# Patient Record
Sex: FEMALE | URBAN - METROPOLITAN AREA
[De-identification: names, ages, dates, MRNs, and addresses within clinical notes are randomized per-mention and may not be internally consistent; named-entity substitution may affect disease eponyms.]

---

## 2021-11-03 ENCOUNTER — APPOINTMENT (OUTPATIENT)
Dept: URBAN - METROPOLITAN AREA CLINIC 321 | Age: 26
Setting detail: DERMATOLOGY
End: 2021-11-03

## 2021-11-03 DIAGNOSIS — L70.0 ACNE VULGARIS: ICD-10-CM

## 2021-11-03 DIAGNOSIS — Z79.899 OTHER LONG TERM (CURRENT) DRUG THERAPY: ICD-10-CM

## 2021-11-03 PROCEDURE — OTHER ISOTRETINOIN INITIATION: OTHER

## 2021-11-03 PROCEDURE — OTHER HIGH RISK MEDICATION MONITORING: OTHER

## 2021-11-03 PROCEDURE — OTHER COUNSELING: OTHER

## 2021-11-03 PROCEDURE — OTHER ORDER TESTS: OTHER

## 2021-11-03 PROCEDURE — OTHER PRESCRIPTION: OTHER

## 2021-11-03 PROCEDURE — OTHER PATIENT SPECIFIC COUNSELING: OTHER

## 2021-11-03 PROCEDURE — 99204 OFFICE O/P NEW MOD 45 MIN: CPT

## 2021-11-03 RX ORDER — CLINDAMYCIN PHOSPHATE 10 MG/ML
LOTION TOPICAL
Qty: 60 | Refills: 0 | Status: ERX

## 2021-11-03 RX ORDER — TRETINOIN 0.5 MG/G
GEL TOPICAL
Qty: 45 | Refills: 0 | Status: ERX

## 2021-11-03 NOTE — HPI: PIMPLES (ACNE)
What Type Of Note Output Would You Prefer (Optional)?: Bullet Format
How Severe Is Your Acne?: moderate
Is This A New Presentation, Or A Follow-Up?: Acne
Females Only: When Was Your Last Menstrual Period?: 10/28/2021

## 2021-11-03 NOTE — PROCEDURE: COUNSELING
Erythromycin Counseling:  I discussed with the patient the risks of erythromycin including but not limited to GI upset, allergic reaction, drug rash, diarrhea, increase in liver enzymes, and yeast infections.
Minocycline Counseling: Patient advised regarding possible photosensitivity and discoloration of the teeth, skin, lips, tongue and gums.  Patient instructed to avoid sunlight, if possible.  When exposed to sunlight, patients should wear protective clothing, sunglasses, and sunscreen.  The patient was instructed to call the office immediately if the following severe adverse effects occur:  hearing changes, easy bruising/bleeding, severe headache, or vision changes.  The patient verbalized understanding of the proper use and possible adverse effects of minocycline.  All of the patient's questions and concerns were addressed.
Topical Retinoids Recommendations: Rec OTC adapalene gel 0.1% (i.e differin) if Rx is not covered.
Azithromycin Counseling:  I discussed with the patient the risks of azithromycin including but not limited to GI upset, allergic reaction, drug rash, diarrhea, and yeast infections.
Minocycline Pregnancy And Lactation Text: This medication is Pregnancy Category D and not consider safe during pregnancy. It is also excreted in breast milk.
Topical Clindamycin Pregnancy And Lactation Text: This medication is Pregnancy Category B and is considered safe during pregnancy. It is unknown if it is excreted in breast milk.
Isotretinoin Counseling: Patient should get monthly blood tests, not donate blood, not drive at night if vision affected, not share medication, and not undergo elective surgery for 6 months after tx completed. Side effects reviewed, pt to contact office should one occur.
Spironolactone Pregnancy And Lactation Text: This medication can cause feminization of the male fetus and should be avoided during pregnancy. The active metabolite is also found in breast milk.
Tetracycline Counseling: Patient counseled regarding possible photosensitivity and increased risk for sunburn.  Patient instructed to avoid sunlight, if possible.  When exposed to sunlight, patients should wear protective clothing, sunglasses, and sunscreen.  The patient was instructed to call the office immediately if the following severe adverse effects occur:  hearing changes, easy bruising/bleeding, severe headache, or vision changes.  The patient verbalized understanding of the proper use and possible adverse effects of tetracycline.  All of the patient's questions and concerns were addressed. Patient understands to avoid pregnancy while on therapy due to potential birth defects.
Doxycycline Pregnancy And Lactation Text: This medication is Pregnancy Category D and not consider safe during pregnancy. It is also excreted in breast milk but is considered safe for shorter treatment courses.
Bactrim Pregnancy And Lactation Text: This medication is Pregnancy Category D and is known to cause fetal risk.  It is also excreted in breast milk.
Azithromycin Pregnancy And Lactation Text: This medication is considered safe during pregnancy and is also secreted in breast milk.
Erythromycin Pregnancy And Lactation Text: This medication is Pregnancy Category B and is considered safe during pregnancy. It is also excreted in breast milk.
Sarecycline Counseling: Patient advised regarding possible photosensitivity and discoloration of the teeth, skin, lips, tongue and gums.  Patient instructed to avoid sunlight, if possible.  When exposed to sunlight, patients should wear protective clothing, sunglasses, and sunscreen.  The patient was instructed to call the office immediately if the following severe adverse effects occur:  hearing changes, easy bruising/bleeding, severe headache, or vision changes.  The patient verbalized understanding of the proper use and possible adverse effects of sarecycline.  All of the patient's questions and concerns were addressed.
Tazorac Pregnancy And Lactation Text: This medication is not safe during pregnancy. It is unknown if this medication is excreted in breast milk.
Bpo Recommendations: Recommend panoxyl or cerave 4% BPO wash on face for 30-45 seconds daily or as tolerated (reduce use if drying/irritating to face). Can bleach fabrics/hair.  Recommend 10% BPO wash (i.e panoxyl) for body for 2-3 minutes daily, adjusting frequency/duration as tolerated.
Spironolactone Counseling: Patient advised regarding risks of diarrhea, abdominal pain, hyperkalemia, birth defects (for female patients), liver toxicity and renal toxicity. The patient may need blood work to monitor liver and kidney function and potassium levels while on therapy. The patient verbalized understanding of the proper use and possible adverse effects of spironolactone.  All of the patient's questions and concerns were addressed.
Dapsone Pregnancy And Lactation Text: This medication is Pregnancy Category C and is not considered safe during pregnancy or breast feeding.
Detail Level: Detailed
Benzoyl Peroxide Counseling: Patient counseled that medicine may cause skin irritation and bleach clothing.  In the event of skin irritation, the patient was advised to reduce the amount of the drug applied or use it less frequently.   The patient verbalized understanding of the proper use and possible adverse effects of benzoyl peroxide.  All of the patient's questions and concerns were addressed.
High Dose Vitamin A Counseling: Side effects reviewed, pt to contact office should one occur.
Use Enhanced Medication Counseling?: No
High Dose Vitamin A Pregnancy And Lactation Text: High dose vitamin A therapy is contraindicated during pregnancy and breast feeding.
Birth Control Pills Counseling: Birth Control Pill Counseling: I discussed with the patient the potential side effects of OCPs including but not limited to increased risk of stroke, heart attack, thrombophlebitis, deep venous thrombosis, hepatic adenomas, breast changes, GI upset, headaches, and depression.  The patient verbalized understanding of the proper use and possible adverse effects of OCPs. All of the patient's questions and concerns were addressed.
Topical Sulfur Applications Pregnancy And Lactation Text: This medication is Pregnancy Category C and has an unknown safety profile during pregnancy. It is unknown if this topical medication is excreted in breast milk.
Topical Retinoid Pregnancy And Lactation Text: This medication is Pregnancy Category C. It is unknown if this medication is excreted in breast milk.
Tazorac Counseling:  Patient advised that medication is irritating and drying.  Patient may need to apply sparingly and wash off after an hour before eventually leaving it on overnight.  The patient verbalized understanding of the proper use and possible adverse effects of tazorac.  All of the patient's questions and concerns were addressed.
Isotretinoin Pregnancy And Lactation Text: This medication is Pregnancy Category X and is considered extremely dangerous during pregnancy. It is unknown if it is excreted in breast milk.
Topical Retinoid counseling:  Patient advised to apply a pea-sized amount only at bedtime and wait 30 minutes after washing their face before applying.  If too drying, patient may add a non-comedogenic moisturizer. The patient verbalized understanding of the proper use and possible adverse effects of retinoids.  All of the patient's questions and concerns were addressed.
Topical Sulfur Applications Counseling: Topical Sulfur Counseling: Patient counseled that this medication may cause skin irritation or allergic reactions.  In the event of skin irritation, the patient was advised to reduce the amount of the drug applied or use it less frequently.   The patient verbalized understanding of the proper use and possible adverse effects of topical sulfur application.  All of the patient's questions and concerns were addressed.
Doxycycline Counseling:  Patient counseled regarding possible photosensitivity and increased risk for sunburn.  Patient instructed to avoid sunlight, if possible.  When exposed to sunlight, patients should wear protective clothing, sunglasses, and sunscreen.  The patient was instructed to call the office immediately if the following severe adverse effects occur:  hearing changes, easy bruising/bleeding, severe headache, or vision changes.  The patient verbalized understanding of the proper use and possible adverse effects of doxycycline.  All of the patient's questions and concerns were addressed.
Dapsone Counseling: I discussed with the patient the risks of dapsone including but not limited to hemolytic anemia, agranulocytosis, rashes, methemoglobinemia, kidney failure, peripheral neuropathy, headaches, GI upset, and liver toxicity.  Patients who start dapsone require monitoring including baseline LFTs and weekly CBCs for the first month, then every month thereafter.  The patient verbalized understanding of the proper use and possible adverse effects of dapsone.  All of the patient's questions and concerns were addressed.
Topical Clindamycin Counseling: Patient counseled that this medication may cause skin irritation or allergic reactions.  In the event of skin irritation, the patient was advised to reduce the amount of the drug applied or use it less frequently.   The patient verbalized understanding of the proper use and possible adverse effects of clindamycin.  All of the patient's questions and concerns were addressed.
Birth Control Pills Pregnancy And Lactation Text: This medication should be avoided if pregnant and for the first 30 days post-partum.
Bactrim Counseling:  I discussed with the patient the risks of sulfa antibiotics including but not limited to GI upset, allergic reaction, drug rash, diarrhea, dizziness, photosensitivity, and yeast infections.  Rarely, more serious reactions can occur including but not limited to aplastic anemia, agranulocytosis, methemoglobinemia, blood dyscrasias, liver or kidney failure, lung infiltrates or desquamative/blistering drug rashes.
Benzoyl Peroxide Pregnancy And Lactation Text: This medication is Pregnancy Category C. It is unknown if benzoyl peroxide is excreted in breast milk.

## 2021-11-03 NOTE — PROCEDURE: HIGH RISK MEDICATION MONITORING
Tremfya Counseling: I discussed with the patient the risks of guselkumab including but not limited to immunosuppression, serious infections, and drug reactions.  The patient understands that monitoring is required including a PPD at baseline and must alert us or the primary physician if symptoms of infection or other concerning signs are noted.
Cephalexin Pregnancy And Lactation Text: This medication is Pregnancy Category B and considered safe during pregnancy.  It is also excreted in breast milk but can be used safely for shorter doses.
Bexarotene Counseling:  I discussed with the patient the risks of bexarotene including but not limited to hair loss, dry lips/skin/eyes, liver abnormalities, hyperlipidemia, pancreatitis, depression/suicidal ideation, photosensitivity, drug rash/allergic reactions, hypothyroidism, anemia, leukopenia, infection, cataracts, and teratogenicity.  Patient understands that they will need regular blood tests to check lipid profile, liver function tests, white blood cell count, thyroid function tests and pregnancy test if applicable.
Ketoconazole Counseling:   Patient counseled regarding improving absorption with orange juice.  Adverse effects include but are not limited to breast enlargement, headache, diarrhea, nausea, upset stomach, liver function test abnormalities, taste disturbance, and stomach pain.  There is a rare possibility of liver failure that can occur when taking ketoconazole. The patient understands that monitoring of LFTs may be required, especially at baseline. The patient verbalized understanding of the proper use and possible adverse effects of ketoconazole.  All of the patient's questions and concerns were addressed.
Elidel Pregnancy And Lactation Text: This medication is Pregnancy Category C. It is unknown if this medication is excreted in breast milk.
Hydroxychloroquine Counseling:  I discussed with the patient that a baseline ophthalmologic exam is needed at the start of therapy and every year thereafter while on therapy. A CBC may also be warranted for monitoring.  The side effects of this medication were discussed with the patient, including but not limited to agranulocytosis, aplastic anemia, seizures, rashes, retinopathy, and liver toxicity. Patient instructed to call the office should any adverse effect occur.  The patient verbalized understanding of the proper use and possible adverse effects of Plaquenil.  All the patient's questions and concerns were addressed.
Birth Control Pills Counseling: Birth Control Pill Counseling: I discussed with the patient the potential side effects of OCPs including but not limited to increased risk of stroke, heart attack, thrombophlebitis, deep venous thrombosis, hepatic adenomas, breast changes, GI upset, headaches, and depression.  The patient verbalized understanding of the proper use and possible adverse effects of OCPs. All of the patient's questions and concerns were addressed.
Cyclosporine Pregnancy And Lactation Text: This medication is Pregnancy Category C and it isn't know if it is safe during pregnancy. This medication is excreted in breast milk.
Tremfya Pregnancy And Lactation Text: The risk during pregnancy and breastfeeding is uncertain with this medication.
5-Fu Pregnancy And Lactation Text: This medication is Pregnancy Category X and contraindicated in pregnancy and in women who may become pregnant. It is unknown if this medication is excreted in breast milk.
Rituxan Pregnancy And Lactation Text: This medication is Pregnancy Category C and it isn't know if it is safe during pregnancy. It is unknown if this medication is excreted in breast milk but similar antibodies are known to be excreted.
Doxycycline Pregnancy And Lactation Text: This medication is Pregnancy Category D and not consider safe during pregnancy. It is also excreted in breast milk but is considered safe for shorter treatment courses.
Griseofulvin Pregnancy And Lactation Text: This medication is Pregnancy Category X and is known to cause serious birth defects. It is unknown if this medication is excreted in breast milk but breast feeding should be avoided.
Zyclara Counseling:  I discussed with the patient the risks of imiquimod including but not limited to erythema, scaling, itching, weeping, crusting, and pain.  Patient understands that the inflammatory response to imiquimod is variable from person to person and was educated regarded proper titration schedule.  If flu-like symptoms develop, patient knows to discontinue the medication and contact us.
Quinolones Counseling:  I discussed with the patient the risks of fluoroquinolones including but not limited to GI upset, allergic reaction, drug rash, diarrhea, dizziness, photosensitivity, yeast infections, liver function test abnormalities, tendonitis/tendon rupture.
Hydroxyzine Counseling: Patient advised that the medication is sedating and not to drive a car after taking this medication.  Patient informed of potential adverse effects including but not limited to dry mouth, urinary retention, and blurry vision.  The patient verbalized understanding of the proper use and possible adverse effects of hydroxyzine.  All of the patient's questions and concerns were addressed.
SSKI Counseling:  I discussed with the patient the risks of SSKI including but not limited to thyroid abnormalities, metallic taste, GI upset, fever, headache, acne, arthralgias, paraesthesias, lymphadenopathy, easy bleeding, arrhythmias, and allergic reaction.
Topical Clindamycin Pregnancy And Lactation Text: This medication is Pregnancy Category B and is considered safe during pregnancy. It is unknown if it is excreted in breast milk.
Drysol Pregnancy And Lactation Text: This medication is considered safe during pregnancy and breast feeding.
5-Fu Counseling: 5-Fluorouracil Counseling:  I discussed with the patient the risks of 5-fluorouracil including but not limited to erythema, scaling, itching, weeping, crusting, and pain.
Skyrizi Counseling: I discussed with the patient the risks of risankizumab-rzaa including but not limited to immunosuppression, and serious infections.  The patient understands that monitoring is required including a PPD at baseline and must alert us or the primary physician if symptoms of infection or other concerning signs are noted.
Albendazole Pregnancy And Lactation Text: This medication is Pregnancy Category C and it isn't known if it is safe during pregnancy. It is also excreted in breast milk.
Glycopyrrolate Pregnancy And Lactation Text: This medication is Pregnancy Category B and is considered safe during pregnancy. It is unknown if it is excreted breast milk.
Rifampin Counseling: I discussed with the patient the risks of rifampin including but not limited to liver damage, kidney damage, red-orange body fluids, nausea/vomiting and severe allergy.
Simponi Counseling:  I discussed with the patient the risks of golimumab including but not limited to myelosuppression, immunosuppression, autoimmune hepatitis, demyelinating diseases, lymphoma, and serious infections.  The patient understands that monitoring is required including a PPD at baseline and must alert us or the primary physician if symptoms of infection or other concerning signs are noted.
Cosentyx Pregnancy And Lactation Text: This medication is Pregnancy Category B and is considered safe during pregnancy. It is unknown if this medication is excreted in breast milk.
Benzoyl Peroxide Pregnancy And Lactation Text: This medication is Pregnancy Category C. It is unknown if benzoyl peroxide is excreted in breast milk.
Doxepin Pregnancy And Lactation Text: This medication is Pregnancy Category C and it isn't known if it is safe during pregnancy. It is also excreted in breast milk and breast feeding isn't recommended.
Otezla Pregnancy And Lactation Text: This medication is Pregnancy Category C and it isn't known if it is safe during pregnancy. It is unknown if it is excreted in breast milk.
Nsaids Counseling: NSAID Counseling: I discussed with the patient that NSAIDs should be taken with food. Prolonged use of NSAIDs can result in the development of stomach ulcers.  Patient advised to stop taking NSAIDs if abdominal pain occurs.  The patient verbalized understanding of the proper use and possible adverse effects of NSAIDs.  All of the patient's questions and concerns were addressed.
Hydroquinone Pregnancy And Lactation Text: This medication has not been assigned a Pregnancy Risk Category but animal studies failed to show danger with the topical medication. It is unknown if the medication is excreted in breast milk.
Isotretinoin Pregnancy And Lactation Text: This medication is Pregnancy Category X and is considered extremely dangerous during pregnancy. It is unknown if it is excreted in breast milk.
Oxybutynin Counseling:  I discussed with the patient the risks of oxybutynin including but not limited to skin rash, drowsiness, dry mouth, difficulty urinating, and blurred vision.
Erythromycin Counseling:  I discussed with the patient the risks of erythromycin including but not limited to GI upset, allergic reaction, drug rash, diarrhea, increase in liver enzymes, and yeast infections.
Clofazimine Pregnancy And Lactation Text: This medication is Pregnancy Category C and isn't considered safe during pregnancy. It is excreted in breast milk.
Xolair Pregnancy And Lactation Text: This medication is Pregnancy Category B and is considered safe during pregnancy. This medication is excreted in breast milk.
Eucrisa Counseling: Patient may experience a mild burning sensation during topical application. Eucrisa is not approved in children less than 2 years of age.
Glycopyrrolate Counseling:  I discussed with the patient the risks of glycopyrrolate including but not limited to skin rash, drowsiness, dry mouth, difficulty urinating, and blurred vision.
Doxycycline Counseling:  Patient counseled regarding possible photosensitivity and increased risk for sunburn.  Patient instructed to avoid sunlight, if possible.  When exposed to sunlight, patients should wear protective clothing, sunglasses, and sunscreen.  The patient was instructed to call the office immediately if the following severe adverse effects occur:  hearing changes, easy bruising/bleeding, severe headache, or vision changes.  The patient verbalized understanding of the proper use and possible adverse effects of doxycycline.  All of the patient's questions and concerns were addressed.
Birth Control Pills Pregnancy And Lactation Text: This medication should be avoided if pregnant and for the first 30 days post-partum.
Acitretin Pregnancy And Lactation Text: This medication is Pregnancy Category X and should not be given to women who are pregnant or may become pregnant in the future. This medication is excreted in breast milk.
Bactrim Counseling:  I discussed with the patient the risks of sulfa antibiotics including but not limited to GI upset, allergic reaction, drug rash, diarrhea, dizziness, photosensitivity, and yeast infections.  Rarely, more serious reactions can occur including but not limited to aplastic anemia, agranulocytosis, methemoglobinemia, blood dyscrasias, liver or kidney failure, lung infiltrates or desquamative/blistering drug rashes.
Prednisone Counseling:  I discussed with the patient the risks of prolonged use of prednisone including but not limited to weight gain, insomnia, osteoporosis, mood changes, diabetes, susceptibility to infection, glaucoma and high blood pressure.  In cases where prednisone use is prolonged, patients should be monitored with blood pressure checks, serum glucose levels and an eye exam.  Additionally, the patient may need to be placed on GI prophylaxis, PCP prophylaxis, and calcium and vitamin D supplementation and/or a bisphosphonate.  The patient verbalized understanding of the proper use and the possible adverse effects of prednisone.  All of the patient's questions and concerns were addressed.
Spironolactone Pregnancy And Lactation Text: This medication can cause feminization of the male fetus and should be avoided during pregnancy. The active metabolite is also found in breast milk.
Cimzia Counseling:  I discussed with the patient the risks of Cimzia including but not limited to immunosuppression, allergic reactions and infections.  The patient understands that monitoring is required including a PPD at baseline and must alert us or the primary physician if symptoms of infection or other concerning signs are noted.
Solaraze Pregnancy And Lactation Text: This medication is Pregnancy Category B and is considered safe. There is some data to suggest avoiding during the third trimester. It is unknown if this medication is excreted in breast milk.
Cosentyx Counseling:  I discussed with the patient the risks of Cosentyx including but not limited to worsening of Crohn's disease, immunosuppression, allergic reactions and infections.  The patient understands that monitoring is required including a PPD at baseline and must alert us or the primary physician if symptoms of infection or other concerning signs are noted.
Xelmicaelaz Pregnancy And Lactation Text: This medication is Pregnancy Category D and is not considered safe during pregnancy.  The risk during breast feeding is also uncertain.
Benzoyl Peroxide Counseling: Patient counseled that medicine may cause skin irritation and bleach clothing.  In the event of skin irritation, the patient was advised to reduce the amount of the drug applied or use it less frequently.   The patient verbalized understanding of the proper use and possible adverse effects of benzoyl peroxide.  All of the patient's questions and concerns were addressed.
Xeljanz Counseling: I discussed with the patient the risks of Xeljanz therapy including increased risk of infection, liver issues, headache, diarrhea, or cold symptoms. Live vaccines should be avoided. They were instructed to call if they have any problems.
Infliximab Counseling:  I discussed with the patient the risks of infliximab including but not limited to myelosuppression, immunosuppression, autoimmune hepatitis, demyelinating diseases, lymphoma, and serious infections.  The patient understands that monitoring is required including a PPD at baseline and must alert us or the primary physician if symptoms of infection or other concerning signs are noted.
High Dose Vitamin A Pregnancy And Lactation Text: High dose vitamin A therapy is contraindicated during pregnancy and breast feeding.
Azithromycin Pregnancy And Lactation Text: This medication is considered safe during pregnancy and is also secreted in breast milk.
Valtrex Counseling: I discussed with the patient the risks of valacyclovir including but not limited to kidney damage, nausea, vomiting and severe allergy.  The patient understands that if the infection seems to be worsening or is not improving, they are to call.
Itraconazole Pregnancy And Lactation Text: This medication is Pregnancy Category C and it isn't know if it is safe during pregnancy. It is also excreted in breast milk.
Azathioprine Pregnancy And Lactation Text: This medication is Pregnancy Category D and isn't considered safe during pregnancy. It is unknown if this medication is excreted in breast milk.
Ivermectin Counseling:  Patient instructed to take medication on an empty stomach with a full glass of water.  Patient informed of potential adverse effects including but not limited to nausea, diarrhea, dizziness, itching, and swelling of the extremities or lymph nodes.  The patient verbalized understanding of the proper use and possible adverse effects of ivermectin.  All of the patient's questions and concerns were addressed.
Bexarotene Pregnancy And Lactation Text: This medication is Pregnancy Category X and should not be given to women who are pregnant or may become pregnant. This medication should not be used if you are breast feeding.
Griseofulvin Counseling:  I discussed with the patient the risks of griseofulvin including but not limited to photosensitivity, cytopenia, liver damage, nausea/vomiting and severe allergy.  The patient understands that this medication is best absorbed when taken with a fatty meal (e.g., ice cream or french fries).
Itraconazole Counseling:  I discussed with the patient the risks of itraconazole including but not limited to liver damage, nausea/vomiting, neuropathy, and severe allergy.  The patient understands that this medication is best absorbed when taken with acidic beverages such as non-diet cola or ginger ale.  The patient understands that monitoring is required including baseline LFTs and repeat LFTs at intervals.  The patient understands that they are to contact us or the primary physician if concerning signs are noted.
Arava Pregnancy And Lactation Text: This medication is Pregnancy Category X and is absolutely contraindicated during pregnancy. It is unknown if it is excreted in breast milk.
Protopic Counseling: Patient may experience a mild burning sensation during topical application. Protopic is not approved in children less than 2 years of age. There have been case reports of hematologic and skin malignancies in patients using topical calcineurin inhibitors although causality is questionable.
Doxepin Counseling:  Patient advised that the medication is sedating and not to drive a car after taking this medication. Patient informed of potential adverse effects including but not limited to dry mouth, urinary retention, and blurry vision.  The patient verbalized understanding of the proper use and possible adverse effects of doxepin.  All of the patient's questions and concerns were addressed.
Terbinafine Pregnancy And Lactation Text: This medication is Pregnancy Category B and is considered safe during pregnancy. It is also excreted in breast milk and breast feeding isn't recommended.
Albendazole Counseling:  I discussed with the patient the risks of albendazole including but not limited to cytopenia, kidney damage, nausea/vomiting and severe allergy.  The patient understands that this medication is being used in an off-label manner.
Enbrel Counseling:  I discussed with the patient the risks of etanercept including but not limited to myelosuppression, immunosuppression, autoimmune hepatitis, demyelinating diseases, lymphoma, and infections.  The patient understands that monitoring is required including a PPD at baseline and must alert us or the primary physician if symptoms of infection or other concerning signs are noted.
Hydroxychloroquine Pregnancy And Lactation Text: This medication has been shown to cause fetal harm but it isn't assigned a Pregnancy Risk Category. There are small amounts excreted in breast milk.
Elidel Counseling: Patient may experience a mild burning sensation during topical application. Elidel is not approved in children less than 2 years of age. There have been case reports of hematologic and skin malignancies in patients using topical calcineurin inhibitors although causality is questionable.
Fluconazole Counseling:  Patient counseled regarding adverse effects of fluconazole including but not limited to headache, diarrhea, nausea, upset stomach, liver function test abnormalities, taste disturbance, and stomach pain.  There is a rare possibility of liver failure that can occur when taking fluconazole.  The patient understands that monitoring of LFTs and kidney function test may be required, especially at baseline. The patient verbalized understanding of the proper use and possible adverse effects of fluconazole.  All of the patient's questions and concerns were addressed.
Use Enhanced Medication Counseling?: No
Spironolactone Counseling: Patient advised regarding risks of diarrhea, abdominal pain, hyperkalemia, birth defects (for female patients), liver toxicity and renal toxicity. The patient may need blood work to monitor liver and kidney function and potassium levels while on therapy. The patient verbalized understanding of the proper use and possible adverse effects of spironolactone.  All of the patient's questions and concerns were addressed.
Hydroxyzine Pregnancy And Lactation Text: This medication is not safe during pregnancy and should not be taken. It is also excreted in breast milk and breast feeding isn't recommended.
Azathioprine Counseling:  I discussed with the patient the risks of azathioprine including but not limited to myelosuppression, immunosuppression, hepatotoxicity, lymphoma, and infections.  The patient understands that monitoring is required including baseline LFTs, Creatinine, possible TPMP genotyping and weekly CBCs for the first month and then every 2 weeks thereafter.  The patient verbalized understanding of the proper use and possible adverse effects of azathioprine.  All of the patient's questions and concerns were addressed.
Erivedge Counseling- I discussed with the patient the risks of Erivedge including but not limited to nausea, vomiting, diarrhea, constipation, weight loss, changes in the sense of taste, decreased appetite, muscle spasms, and hair loss.  The patient verbalized understanding of the proper use and possible adverse effects of Erivedge.  All of the patient's questions and concerns were addressed.
Cimzia Pregnancy And Lactation Text: This medication crosses the placenta but can be considered safe in certain situations. Cimzia may be excreted in breast milk.
Metronidazole Counseling:  I discussed with the patient the risks of metronidazole including but not limited to seizures, nausea/vomiting, a metallic taste in the mouth, nausea/vomiting and severe allergy.
Tazorac Pregnancy And Lactation Text: This medication is not safe during pregnancy. It is unknown if this medication is excreted in breast milk.
Sarecycline Counseling: Patient advised regarding possible photosensitivity and discoloration of the teeth, skin, lips, tongue and gums.  Patient instructed to avoid sunlight, if possible.  When exposed to sunlight, patients should wear protective clothing, sunglasses, and sunscreen.  The patient was instructed to call the office immediately if the following severe adverse effects occur:  hearing changes, easy bruising/bleeding, severe headache, or vision changes.  The patient verbalized understanding of the proper use and possible adverse effects of sarecycline.  All of the patient's questions and concerns were addressed.
Bactrim Pregnancy And Lactation Text: This medication is Pregnancy Category D and is known to cause fetal risk.  It is also excreted in breast milk.
Terbinafine Counseling: Patient counseling regarding adverse effects of terbinafine including but not limited to headache, diarrhea, rash, upset stomach, liver function test abnormalities, itching, taste/smell disturbance, nausea, abdominal pain, and flatulence.  There is a rare possibility of liver failure that can occur when taking terbinafine.  The patient understands that a baseline LFT and kidney function test may be required. The patient verbalized understanding of the proper use and possible adverse effects of terbinafine.  All of the patient's questions and concerns were addressed.
Thalidomide Counseling: I discussed with the patient the risks of thalidomide including but not limited to birth defects, anxiety, weakness, chest pain, dizziness, cough and severe allergy.
Ketoconazole Pregnancy And Lactation Text: This medication is Pregnancy Category C and it isn't know if it is safe during pregnancy. It is also excreted in breast milk and breast feeding isn't recommended.
Imiquimod Counseling:  I discussed with the patient the risks of imiquimod including but not limited to erythema, scaling, itching, weeping, crusting, and pain.  Patient understands that the inflammatory response to imiquimod is variable from person to person and was educated regarded proper titration schedule.  If flu-like symptoms develop, patient knows to discontinue the medication and contact us.
Topical Clindamycin Counseling: Patient counseled that this medication may cause skin irritation or allergic reactions.  In the event of skin irritation, the patient was advised to reduce the amount of the drug applied or use it less frequently.   The patient verbalized understanding of the proper use and possible adverse effects of clindamycin.  All of the patient's questions and concerns were addressed.
Xolair Counseling:  Patient informed of potential adverse effects including but not limited to fever, muscle aches, rash and allergic reactions.  The patient verbalized understanding of the proper use and possible adverse effects of Xolair.  All of the patient's questions and concerns were addressed.
Siliq Counseling:  I discussed with the patient the risks of Siliq including but not limited to new or worsening depression, suicidal thoughts and behavior, immunosuppression, malignancy, posterior leukoencephalopathy syndrome, and serious infections.  The patient understands that monitoring is required including a PPD at baseline and must alert us or the primary physician if symptoms of infection or other concerning signs are noted. There is also a special program designed to monitor depression which is required with Siliq.
Tazorac Counseling:  Patient advised that medication is irritating and drying.  Patient may need to apply sparingly and wash off after an hour before eventually leaving it on overnight.  The patient verbalized understanding of the proper use and possible adverse effects of tazorac.  All of the patient's questions and concerns were addressed.
Solaraze Counseling:  I discussed with the patient the risks of Solaraze including but not limited to erythema, scaling, itching, weeping, crusting, and pain.
Cyclophosphamide Counseling:  I discussed with the patient the risks of cyclophosphamide including but not limited to hair loss, hormonal abnormalities, decreased fertility, abdominal pain, diarrhea, nausea and vomiting, bone marrow suppression and infection. The patient understands that monitoring is required while taking this medication.
Cephalexin Counseling: I counseled the patient regarding use of cephalexin as an antibiotic for prophylactic and/or therapeutic purposes. Cephalexin (commonly prescribed under brand name Keflex) is a cephalosporin antibiotic which is active against numerous classes of bacteria, including most skin bacteria. Side effects may include nausea, diarrhea, gastrointestinal upset, rash, hives, yeast infections, and in rare cases, hepatitis, kidney disease, seizures, fever, confusion, neurologic symptoms, and others. Patients with severe allergies to penicillin medications are cautioned that there is about a 10% incidence of cross-reactivity with cephalosporins. When possible, patients with penicillin allergies should use alternatives to cephalosporins for antibiotic therapy.
Arava Counseling:  Patient counseled regarding adverse effects of Arava including but not limited to nausea, vomiting, abnormalities in liver function tests. Patients may develop mouth sores, rash, diarrhea, and abnormalities in blood counts. The patient understands that monitoring is required including LFTs and blood counts.  There is a rare possibility of scarring of the liver and lung problems that can occur when taking methotrexate. Persistent nausea, loss of appetite, pale stools, dark urine, cough, and shortness of breath should be reported immediately. Patient advised to discontinue Arava treatment and consult with a physician prior to attempting conception. The patient will have to undergo a treatment to eliminate Arava from the body prior to conception.
Hydroquinone Counseling:  Patient advised that medication may result in skin irritation, lightening (hypopigmentation), dryness, and burning.  In the event of skin irritation, the patient was advised to reduce the amount of the drug applied or use it less frequently.  Rarely, spots that are treated with hydroquinone can become darker (pseudoochronosis).  Should this occur, patient instructed to stop medication and call the office. The patient verbalized understanding of the proper use and possible adverse effects of hydroquinone.  All of the patient's questions and concerns were addressed.
Cimetidine Counseling:  I discussed with the patient the risks of Cimetidine including but not limited to gynecomastia, headache, diarrhea, nausea, drowsiness, arrhythmias, pancreatitis, skin rashes, psychosis, bone marrow suppression and kidney toxicity.
Stelara Counseling:  I discussed with the patient the risks of ustekinumab including but not limited to immunosuppression, malignancy, posterior leukoencephalopathy syndrome, and serious infections.  The patient understands that monitoring is required including a PPD at baseline and must alert us or the primary physician if symptoms of infection or other concerning signs are noted.
Gabapentin Counseling: I discussed with the patient the risks of gabapentin including but not limited to dizziness, somnolence, fatigue and ataxia.
Cellcept Counseling:  I discussed with the patient the risks of mycophenolate mofetil including but not limited to infection/immunosuppression, GI upset, hypokalemia, hypercholesterolemia, bone marrow suppression, lymphoproliferative disorders, malignancy, GI ulceration/bleed/perforation, colitis, interstitial lung disease, kidney failure, progressive multifocal leukoencephalopathy, and birth defects.  The patient understands that monitoring is required including a baseline creatinine and regular CBC testing. In addition, patient must alert us immediately if symptoms of infection or other concerning signs are noted.
Clindamycin Counseling: I counseled the patient regarding use of clindamycin as an antibiotic for prophylactic and/or therapeutic purposes. Clindamycin is active against numerous classes of bacteria, including skin bacteria. Side effects may include nausea, diarrhea, gastrointestinal upset, rash, hives, yeast infections, and in rare cases, colitis.
Topical Sulfur Applications Counseling: Topical Sulfur Counseling: Patient counseled that this medication may cause skin irritation or allergic reactions.  In the event of skin irritation, the patient was advised to reduce the amount of the drug applied or use it less frequently.   The patient verbalized understanding of the proper use and possible adverse effects of topical sulfur application.  All of the patient's questions and concerns were addressed.
Dapsone Pregnancy And Lactation Text: This medication is Pregnancy Category C and is not considered safe during pregnancy or breast feeding.
Erythromycin Pregnancy And Lactation Text: This medication is Pregnancy Category B and is considered safe during pregnancy. It is also excreted in breast milk.
Tetracycline Pregnancy And Lactation Text: This medication is Pregnancy Category D and not consider safe during pregnancy. It is also excreted in breast milk.
Protopic Pregnancy And Lactation Text: This medication is Pregnancy Category C. It is unknown if this medication is excreted in breast milk when applied topically.
Cyclophosphamide Pregnancy And Lactation Text: This medication is Pregnancy Category D and it isn't considered safe during pregnancy. This medication is excreted in breast milk.
Rituxan Counseling:  I discussed with the patient the risks of Rituxan infusions. Side effects can include infusion reactions, severe drug rashes including mucocutaneous reactions, reactivation of latent hepatitis and other infections and rarely progressive multifocal leukoencephalopathy.  All of the patient's questions and concerns were addressed.
Minoxidil Counseling: Minoxidil is a topical medication which can increase blood flow where it is applied. It is uncertain how this medication increases hair growth. Side effects are uncommon and include stinging and allergic reactions.
Drysol Counseling:  I discussed with the patient the risks of drysol/aluminum chloride including but not limited to skin rash, itching, irritation, burning.
Tetracycline Counseling: Patient counseled regarding possible photosensitivity and increased risk for sunburn.  Patient instructed to avoid sunlight, if possible.  When exposed to sunlight, patients should wear protective clothing, sunglasses, and sunscreen.  The patient was instructed to call the office immediately if the following severe adverse effects occur:  hearing changes, easy bruising/bleeding, severe headache, or vision changes.  The patient verbalized understanding of the proper use and possible adverse effects of tetracycline.  All of the patient's questions and concerns were addressed. Patient understands to avoid pregnancy while on therapy due to potential birth defects.
Rifampin Pregnancy And Lactation Text: This medication is Pregnancy Category C and it isn't know if it is safe during pregnancy. It is also excreted in breast milk and should not be used if you are breast feeding.
Ilumya Counseling: I discussed with the patient the risks of tildrakizumab including but not limited to immunosuppression, malignancy, posterior leukoencephalopathy syndrome, and serious infections.  The patient understands that monitoring is required including a PPD at baseline and must alert us or the primary physician if symptoms of infection or other concerning signs are noted.
Carac Counseling:  I discussed with the patient the risks of Carac including but not limited to erythema, scaling, itching, weeping, crusting, and pain.
Sski Pregnancy And Lactation Text: This medication is Pregnancy Category D and isn't considered safe during pregnancy. It is excreted in breast milk.
Dupixent Pregnancy And Lactation Text: This medication likely crosses the placenta but the risk for the fetus is uncertain. This medication is excreted in breast milk.
Detail Level: Detailed
Acitretin Counseling:  I discussed with the patient the risks of acitretin including but not limited to hair loss, dry lips/skin/eyes, liver damage, hyperlipidemia, depression/suicidal ideation, photosensitivity.  Serious rare side effects can include but are not limited to pancreatitis, pseudotumor cerebri, bony changes, clot formation/stroke/heart attack.  Patient understands that alcohol is contraindicated since it can result in liver toxicity and significantly prolong the elimination of the drug by many years.
Clindamycin Pregnancy And Lactation Text: This medication can be used in pregnancy if certain situations. Clindamycin is also present in breast milk.
Methotrexate Counseling:  Patient counseled regarding adverse effects of methotrexate including but not limited to nausea, vomiting, abnormalities in liver function tests. Patients may develop mouth sores, rash, diarrhea, and abnormalities in blood counts. The patient understands that monitoring is required including LFT's and blood counts.  There is a rare possibility of scarring of the liver and lung problems that can occur when taking methotrexate. Persistent nausea, loss of appetite, pale stools, dark urine, cough, and shortness of breath should be reported immediately. Patient advised to discontinue methotrexate treatment at least three months before attempting to become pregnant.  I discussed the need for folate supplements while taking methotrexate.  These supplements can decrease side effects during methotrexate treatment. The patient verbalized understanding of the proper use and possible adverse effects of methotrexate.  All of the patient's questions and concerns were addressed.
Azithromycin Counseling:  I discussed with the patient the risks of azithromycin including but not limited to GI upset, allergic reaction, drug rash, diarrhea, and yeast infections.
Picato Counseling:  I discussed with the patient the risks of Picato including but not limited to erythema, scaling, itching, weeping, crusting, and pain.
Taltz Counseling: I discussed with the patient the risks of ixekizumab including but not limited to immunosuppression, serious infections, worsening of inflammatory bowel disease and drug reactions.  The patient understands that monitoring is required including a PPD at baseline and must alert us or the primary physician if symptoms of infection or other concerning signs are noted.
Topical Retinoid counseling:  Patient advised to apply a pea-sized amount only at bedtime and wait 30 minutes after washing their face before applying.  If too drying, patient may add a non-comedogenic moisturizer. The patient verbalized understanding of the proper use and possible adverse effects of retinoids.  All of the patient's questions and concerns were addressed.
Dupixent Counseling: I discussed with the patient the risks of dupilumab including but not limited to eye infection and irritation, cold sores, injection site reactions, worsening of asthma, allergic reactions and increased risk of parasitic infection.  Live vaccines should be avoided while taking dupilumab. Dupilumab will also interact with certain medications such as warfarin and cyclosporine. The patient understands that monitoring is required and they must alert us or the primary physician if symptoms of infection or other concerning signs are noted.
Isotretinoin Counseling: Patient should get monthly blood tests, not donate blood, not drive at night if vision affected, not share medication, and not undergo elective surgery for 6 months after tx completed. Side effects reviewed, pt to contact office should one occur.
Methotrexate Pregnancy And Lactation Text: This medication is Pregnancy Category X and is known to cause fetal harm. This medication is excreted in breast milk.
Humira Counseling:  I discussed with the patient the risks of adalimumab including but not limited to myelosuppression, immunosuppression, autoimmune hepatitis, demyelinating diseases, lymphoma, and serious infections.  The patient understands that monitoring is required including a PPD at baseline and must alert us or the primary physician if symptoms of infection or other concerning signs are noted.
Cyclosporine Counseling:  I discussed with the patient the risks of cyclosporine including but not limited to hypertension, gingival hyperplasia,myelosuppression, immunosuppression, liver damage, kidney damage, neurotoxicity, lymphoma, and serious infections. The patient understands that monitoring is required including baseline blood pressure, CBC, CMP, lipid panel and uric acid, and then 1-2 times monthly CMP and blood pressure.
High Dose Vitamin A Counseling: Side effects reviewed, pt to contact office should one occur.
Valtrex Pregnancy And Lactation Text: this medication is Pregnancy Category B and is considered safe during pregnancy. This medication is not directly found in breast milk but it's metabolite acyclovir is present.
Minocycline Counseling: Patient advised regarding possible photosensitivity and discoloration of the teeth, skin, lips, tongue and gums.  Patient instructed to avoid sunlight, if possible.  When exposed to sunlight, patients should wear protective clothing, sunglasses, and sunscreen.  The patient was instructed to call the office immediately if the following severe adverse effects occur:  hearing changes, easy bruising/bleeding, severe headache, or vision changes.  The patient verbalized understanding of the proper use and possible adverse effects of minocycline.  All of the patient's questions and concerns were addressed.
Clofazimine Counseling:  I discussed with the patient the risks of clofazimine including but not limited to skin and eye pigmentation, liver damage, nausea/vomiting, gastrointestinal bleeding and allergy.
Dapsone Counseling: I discussed with the patient the risks of dapsone including but not limited to hemolytic anemia, agranulocytosis, rashes, methemoglobinemia, kidney failure, peripheral neuropathy, headaches, GI upset, and liver toxicity.  Patients who start dapsone require monitoring including baseline LFTs and weekly CBCs for the first month, then every month thereafter.  The patient verbalized understanding of the proper use and possible adverse effects of dapsone.  All of the patient's questions and concerns were addressed.
Topical Sulfur Applications Pregnancy And Lactation Text: This medication is Pregnancy Category C and has an unknown safety profile during pregnancy. It is unknown if this topical medication is excreted in breast milk.
Colchicine Counseling:  Patient counseled regarding adverse effects including but not limited to stomach upset (nausea, vomiting, stomach pain, or diarrhea).  Patient instructed to limit alcohol consumption while taking this medication.  Colchicine may reduce blood counts especially with prolonged use.  The patient understands that monitoring of kidney function and blood counts may be required, especially at baseline. The patient verbalized understanding of the proper use and possible adverse effects of colchicine.  All of the patient's questions and concerns were addressed.
Odomzo Counseling- I discussed with the patient the risks of Odomzo including but not limited to nausea, vomiting, diarrhea, constipation, weight loss, changes in the sense of taste, decreased appetite, muscle spasms, and hair loss.  The patient verbalized understanding of the proper use and possible adverse effects of Odomzo.  All of the patient's questions and concerns were addressed.
Metronidazole Pregnancy And Lactation Text: This medication is Pregnancy Category B and considered safe during pregnancy.  It is also excreted in breast milk.
Nsaids Pregnancy And Lactation Text: These medications are considered safe up to 30 weeks gestation. It is excreted in breast milk.
Otezla Counseling: The side effects of Otezla were discussed with the patient, including but not limited to worsening or new depression, weight loss, diarrhea, nausea, upper respiratory tract infection, and headache. Patient instructed to call the office should any adverse effect occur.  The patient verbalized understanding of the proper use and possible adverse effects of Otezla.  All the patient's questions and concerns were addressed.

## 2021-11-03 NOTE — PROCEDURE: ORDER TESTS
Expected Date Of Service: 11/03/2021
Billing Type: Third-Party Bill
Clinical Notes (To The Lab): Pt must be fasting for lipid panel.
Bill For Surgical Tray: no

## 2021-11-04 ENCOUNTER — APPOINTMENT (OUTPATIENT)
Dept: URBAN - METROPOLITAN AREA CLINIC 321 | Age: 26
Setting detail: DERMATOLOGY
End: 2021-11-08

## 2021-11-04 DIAGNOSIS — Z79.899 OTHER LONG TERM (CURRENT) DRUG THERAPY: ICD-10-CM

## 2021-11-04 PROCEDURE — OTHER ORDER TESTS: OTHER

## 2021-11-08 ENCOUNTER — OFFICE VISIT (OUTPATIENT)
Dept: OBGYN CLINIC | Facility: CLINIC | Age: 26
End: 2021-11-08
Payer: MEDICAID

## 2021-11-08 ENCOUNTER — LAB ENCOUNTER (OUTPATIENT)
Dept: LAB | Facility: REFERENCE LAB | Age: 26
End: 2021-11-08
Attending: FAMILY MEDICINE
Payer: MEDICAID

## 2021-11-08 ENCOUNTER — OFFICE VISIT (OUTPATIENT)
Dept: FAMILY MEDICINE CLINIC | Facility: CLINIC | Age: 26
End: 2021-11-08
Payer: MEDICAID

## 2021-11-08 VITALS
DIASTOLIC BLOOD PRESSURE: 60 MMHG | BODY MASS INDEX: 19.33 KG/M2 | HEART RATE: 66 BPM | SYSTOLIC BLOOD PRESSURE: 94 MMHG | HEIGHT: 65 IN | OXYGEN SATURATION: 99 % | WEIGHT: 116 LBS

## 2021-11-08 VITALS
WEIGHT: 116 LBS | HEIGHT: 65 IN | SYSTOLIC BLOOD PRESSURE: 94 MMHG | BODY MASS INDEX: 19.33 KG/M2 | DIASTOLIC BLOOD PRESSURE: 60 MMHG

## 2021-11-08 DIAGNOSIS — Z00.00 ENCOUNTER FOR ROUTINE ADULT HEALTH EXAMINATION WITHOUT ABNORMAL FINDINGS: ICD-10-CM

## 2021-11-08 DIAGNOSIS — G89.29 CHRONIC LEFT-SIDED LOW BACK PAIN WITHOUT SCIATICA: ICD-10-CM

## 2021-11-08 DIAGNOSIS — Z31.430 ENCOUNTER OF FEMALE FOR TESTING FOR GENETIC DISEASE CARRIER STATUS FOR PROCREATIVE MANAGEMENT: Primary | ICD-10-CM

## 2021-11-08 DIAGNOSIS — Z01.419 ENCOUNTER FOR GYNECOLOGICAL EXAMINATION WITHOUT ABNORMAL FINDING: ICD-10-CM

## 2021-11-08 DIAGNOSIS — F41.9 ANXIETY: ICD-10-CM

## 2021-11-08 DIAGNOSIS — M54.50 CHRONIC LEFT-SIDED LOW BACK PAIN WITHOUT SCIATICA: ICD-10-CM

## 2021-11-08 DIAGNOSIS — Z30.09 FAMILY PLANNING: ICD-10-CM

## 2021-11-08 DIAGNOSIS — Z00.00 ENCOUNTER FOR ROUTINE ADULT HEALTH EXAMINATION WITHOUT ABNORMAL FINDINGS: Primary | ICD-10-CM

## 2021-11-08 DIAGNOSIS — Z23 NEED FOR VACCINATION: ICD-10-CM

## 2021-11-08 DIAGNOSIS — Z86.69 HISTORY OF MIGRAINE: ICD-10-CM

## 2021-11-08 PROCEDURE — 80053 COMPREHEN METABOLIC PANEL: CPT

## 2021-11-08 PROCEDURE — 86803 HEPATITIS C AB TEST: CPT

## 2021-11-08 PROCEDURE — 90686 IIV4 VACC NO PRSV 0.5 ML IM: CPT | Performed by: FAMILY MEDICINE

## 2021-11-08 PROCEDURE — 99385 PREV VISIT NEW AGE 18-39: CPT | Performed by: FAMILY MEDICINE

## 2021-11-08 PROCEDURE — 85025 COMPLETE CBC W/AUTO DIFF WBC: CPT

## 2021-11-08 PROCEDURE — 3078F DIAST BP <80 MM HG: CPT | Performed by: FAMILY MEDICINE

## 2021-11-08 PROCEDURE — 90471 IMMUNIZATION ADMIN: CPT | Performed by: FAMILY MEDICINE

## 2021-11-08 PROCEDURE — 88175 CYTOPATH C/V AUTO FLUID REDO: CPT | Performed by: OBSTETRICS & GYNECOLOGY

## 2021-11-08 PROCEDURE — 99202 OFFICE O/P NEW SF 15 MIN: CPT | Performed by: OBSTETRICS & GYNECOLOGY

## 2021-11-08 PROCEDURE — 3074F SYST BP LT 130 MM HG: CPT | Performed by: FAMILY MEDICINE

## 2021-11-08 PROCEDURE — 3078F DIAST BP <80 MM HG: CPT | Performed by: OBSTETRICS & GYNECOLOGY

## 2021-11-08 PROCEDURE — 3008F BODY MASS INDEX DOCD: CPT | Performed by: OBSTETRICS & GYNECOLOGY

## 2021-11-08 PROCEDURE — 36415 COLL VENOUS BLD VENIPUNCTURE: CPT

## 2021-11-08 PROCEDURE — 3074F SYST BP LT 130 MM HG: CPT | Performed by: OBSTETRICS & GYNECOLOGY

## 2021-11-08 PROCEDURE — 3008F BODY MASS INDEX DOCD: CPT | Performed by: FAMILY MEDICINE

## 2021-11-08 RX ORDER — MELATONIN
325
COMMUNITY

## 2021-11-08 RX ORDER — GARLIC EXTRACT 500 MG
1 CAPSULE ORAL DAILY
COMMUNITY

## 2021-11-08 RX ORDER — MELATONIN
400 DAILY
COMMUNITY

## 2021-11-08 RX ORDER — AMOXICILLIN 500 MG
CAPSULE ORAL
COMMUNITY

## 2021-11-08 RX ORDER — RIBOFLAVIN (VITAMIN B2) 400 MG
400 TABLET ORAL DAILY
COMMUNITY

## 2021-11-08 RX ORDER — CLINDAMYCIN PHOSPHATE 11.9 MG/ML
SOLUTION TOPICAL 2 TIMES DAILY
COMMUNITY

## 2021-11-08 NOTE — PROGRESS NOTES
GYN H&P     2021  10:04 AM    CC: Patient is here for family planning. . Accompanied by her partner. HPI: Patient is a 32year old  for birth control management. She is planning on using accutance 2020 and plans to continue use IUD.  Juana Membreno Vaping Use: Never used    Substance and Sexual Activity      Alcohol use: Never      Drug use: Never      Sexual activity: Yes        Partners: Male        Birth control/protection: Paragard, I.U.D.         Comment: Paragard placed 12/2020    Social Histo

## 2021-11-08 NOTE — PATIENT INSTRUCTIONS
Prevention Guidelines, Women Ages 25 to 44  Screening tests and vaccines are an important part of managing your health. A screening test is done to find possible disorders or diseases in people who don't have any symptoms.  The goal is to find a disease e Anyone at increased risk  At routine exams   HIV All women At routine exams3     Obesity All women in this age group  At routine exams   Syphilis Women at increased risk for infection should talk with their healthcare provider  At routine exams   Tuberculo (protects against 13 types of pneumococcal bacteria)   PPSV23: 1 to 2 doses through age 59, or 1 dose at 72 or older (protects against 23 types of pneumococcal bacteria)    Tetanus/diphtheria/pertussis (Td/Tdap) booster All women in this age group  Td ever not intended as a substitute for professional medical care. Always follow your healthcare professional's instructions. Migraine Headache: Stages and Treatment    A migraine headache tends to progress in stages.  Learning these stages can help you bet find the right medicines for you. Medicines for migraine may relieve pain (analgesics), relieve nausea, or attack the migraine's root causes (migraine-specific medicines).    Rebound headache  Taking analgesics each day, or even several times a week, may le

## 2021-11-08 NOTE — PROGRESS NOTES
HPI:   Rosas Collins is a 32year old female who presents for a complete physical exam.     Seeing a dermatologist and considering starting Acutane soon. Had a lipid panel on Friday.   Has a history of anxiety and depression and feels her anxiety has Tab EC Take 325 mg by mouth daily with breakfast.     • folic acid 868 MCG Oral Tab Take 400 mcg by mouth daily. • Riboflavin 400 MG Oral Tab Take 400 mg by mouth daily.        No Known Allergies   Past Medical History:   Diagnosis Date   • Acne    • Maricruz Jernigan ROM.    Muscle Strength   The patient has normal back strength.     Other   Erythema: no back redness  Scars: absent              No results found for: CHOLEST, HDL, LDL, TRIGLY   ASSESSMENT AND PLAN:   Deyvi Fox is a 32year old female who presen visit and the patient verbalizes understanding. She will return in one year for next [address] or sooner as needed.     Meds & Refills for this Visit:  Requested Prescriptions      No prescriptions requested or ordered in this encounter       Imaging & Consul

## 2021-11-09 ENCOUNTER — APPOINTMENT (OUTPATIENT)
Dept: URBAN - METROPOLITAN AREA CLINIC 321 | Age: 26
Setting detail: DERMATOLOGY
End: 2021-11-10

## 2021-11-09 ENCOUNTER — PATIENT MESSAGE (OUTPATIENT)
Dept: FAMILY MEDICINE CLINIC | Facility: CLINIC | Age: 26
End: 2021-11-09

## 2021-11-09 DIAGNOSIS — Z79.899 OTHER LONG TERM (CURRENT) DRUG THERAPY: ICD-10-CM

## 2021-11-09 PROCEDURE — OTHER ORDER TESTS: OTHER

## 2021-11-10 ENCOUNTER — MED REC SCAN ONLY (OUTPATIENT)
Dept: FAMILY MEDICINE CLINIC | Facility: CLINIC | Age: 26
End: 2021-11-10

## 2021-11-11 ENCOUNTER — APPOINTMENT (OUTPATIENT)
Dept: URBAN - METROPOLITAN AREA CLINIC 321 | Age: 26
Setting detail: DERMATOLOGY
End: 2021-11-12

## 2021-11-11 ENCOUNTER — RX ONLY (RX ONLY)
Age: 26
End: 2021-11-11

## 2021-11-11 DIAGNOSIS — L70.0 ACNE VULGARIS: ICD-10-CM

## 2021-11-11 DIAGNOSIS — Z79.899 OTHER LONG TERM (CURRENT) DRUG THERAPY: ICD-10-CM

## 2021-11-11 PROCEDURE — OTHER COUNSELING: OTHER

## 2021-11-11 PROCEDURE — OTHER ISOTRETINOIN INITIATION: OTHER

## 2021-11-11 PROCEDURE — 99214 OFFICE O/P EST MOD 30 MIN: CPT

## 2021-11-11 PROCEDURE — OTHER HIGH RISK MEDICATION MONITORING: OTHER

## 2021-11-11 PROCEDURE — OTHER PATIENT SPECIFIC COUNSELING: OTHER

## 2021-11-11 RX ORDER — TRETINOIN 0.5 MG/G
GEL TOPICAL
Qty: 45 | Refills: 0 | Status: ERX

## 2021-11-11 NOTE — PROCEDURE: HIGH RISK MEDICATION MONITORING
Infliximab Counseling:  I discussed with the patient the risks of infliximab including but not limited to myelosuppression, immunosuppression, autoimmune hepatitis, demyelinating diseases, lymphoma, and serious infections.  The patient understands that monitoring is required including a PPD at baseline and must alert us or the primary physician if symptoms of infection or other concerning signs are noted.
Acitretin Pregnancy And Lactation Text: This medication is Pregnancy Category X and should not be given to women who are pregnant or may become pregnant in the future. This medication is excreted in breast milk.
Sarecycline Counseling: Patient advised regarding possible photosensitivity and discoloration of the teeth, skin, lips, tongue and gums.  Patient instructed to avoid sunlight, if possible.  When exposed to sunlight, patients should wear protective clothing, sunglasses, and sunscreen.  The patient was instructed to call the office immediately if the following severe adverse effects occur:  hearing changes, easy bruising/bleeding, severe headache, or vision changes.  The patient verbalized understanding of the proper use and possible adverse effects of sarecycline.  All of the patient's questions and concerns were addressed.
Bactrim Pregnancy And Lactation Text: This medication is Pregnancy Category D and is known to cause fetal risk.  It is also excreted in breast milk.
Siliq Pregnancy And Lactation Text: The risk during pregnancy and breastfeeding is uncertain with this medication.
Otezla Counseling: The side effects of Otezla were discussed with the patient, including but not limited to worsening or new depression, weight loss, diarrhea, nausea, upper respiratory tract infection, and headache. Patient instructed to call the office should any adverse effect occur.  The patient verbalized understanding of the proper use and possible adverse effects of Otezla.  All the patient's questions and concerns were addressed.
Azithromycin Pregnancy And Lactation Text: This medication is considered safe during pregnancy and is also secreted in breast milk.
Topical Retinoid Pregnancy And Lactation Text: This medication is Pregnancy Category C. It is unknown if this medication is excreted in breast milk.
Simponi Counseling:  I discussed with the patient the risks of golimumab including but not limited to myelosuppression, immunosuppression, autoimmune hepatitis, demyelinating diseases, lymphoma, and serious infections.  The patient understands that monitoring is required including a PPD at baseline and must alert us or the primary physician if symptoms of infection or other concerning signs are noted.
Rifampin Pregnancy And Lactation Text: This medication is Pregnancy Category C and it isn't know if it is safe during pregnancy. It is also excreted in breast milk and should not be used if you are breast feeding.
Dupixent Pregnancy And Lactation Text: This medication likely crosses the placenta but the risk for the fetus is uncertain. This medication is excreted in breast milk.
Colchicine Counseling:  Patient counseled regarding adverse effects including but not limited to stomach upset (nausea, vomiting, stomach pain, or diarrhea).  Patient instructed to limit alcohol consumption while taking this medication.  Colchicine may reduce blood counts especially with prolonged use.  The patient understands that monitoring of kidney function and blood counts may be required, especially at baseline. The patient verbalized understanding of the proper use and possible adverse effects of colchicine.  All of the patient's questions and concerns were addressed.
Dapsone Counseling: I discussed with the patient the risks of dapsone including but not limited to hemolytic anemia, agranulocytosis, rashes, methemoglobinemia, kidney failure, peripheral neuropathy, headaches, GI upset, and liver toxicity.  Patients who start dapsone require monitoring including baseline LFTs and weekly CBCs for the first month, then every month thereafter.  The patient verbalized understanding of the proper use and possible adverse effects of dapsone.  All of the patient's questions and concerns were addressed.
Fluconazole Pregnancy And Lactation Text: This medication is Pregnancy Category C and it isn't know if it is safe during pregnancy. It is also excreted in breast milk.
Quinolones Counseling:  I discussed with the patient the risks of fluoroquinolones including but not limited to GI upset, allergic reaction, drug rash, diarrhea, dizziness, photosensitivity, yeast infections, liver function test abnormalities, tendonitis/tendon rupture.
Detail Level: Detailed
Ketoconazole Pregnancy And Lactation Text: This medication is Pregnancy Category C and it isn't know if it is safe during pregnancy. It is also excreted in breast milk and breast feeding isn't recommended.
Enbrel Pregnancy And Lactation Text: This medication is Pregnancy Category B and is considered safe during pregnancy. It is unknown if this medication is excreted in breast milk.
Isotretinoin Counseling: Patient should get monthly blood tests, not donate blood, not drive at night if vision affected, not share medication, and not undergo elective surgery for 6 months after tx completed. Side effects reviewed, pt to contact office should one occur.
Glycopyrrolate Pregnancy And Lactation Text: This medication is Pregnancy Category B and is considered safe during pregnancy. It is unknown if it is excreted breast milk.
Hydroxyzine Counseling: Patient advised that the medication is sedating and not to drive a car after taking this medication.  Patient informed of potential adverse effects including but not limited to dry mouth, urinary retention, and blurry vision.  The patient verbalized understanding of the proper use and possible adverse effects of hydroxyzine.  All of the patient's questions and concerns were addressed.
Terbinafine Counseling: Patient counseling regarding adverse effects of terbinafine including but not limited to headache, diarrhea, rash, upset stomach, liver function test abnormalities, itching, taste/smell disturbance, nausea, abdominal pain, and flatulence.  There is a rare possibility of liver failure that can occur when taking terbinafine.  The patient understands that a baseline LFT and kidney function test may be required. The patient verbalized understanding of the proper use and possible adverse effects of terbinafine.  All of the patient's questions and concerns were addressed.
Doxepin Counseling:  Patient advised that the medication is sedating and not to drive a car after taking this medication. Patient informed of potential adverse effects including but not limited to dry mouth, urinary retention, and blurry vision.  The patient verbalized understanding of the proper use and possible adverse effects of doxepin.  All of the patient's questions and concerns were addressed.
Carac Pregnancy And Lactation Text: This medication is Pregnancy Category X and contraindicated in pregnancy and in women who may become pregnant. It is unknown if this medication is excreted in breast milk.
Taltz Counseling: I discussed with the patient the risks of ixekizumab including but not limited to immunosuppression, serious infections, worsening of inflammatory bowel disease and drug reactions.  The patient understands that monitoring is required including a PPD at baseline and must alert us or the primary physician if symptoms of infection or other concerning signs are noted.
Spironolactone Pregnancy And Lactation Text: This medication can cause feminization of the male fetus and should be avoided during pregnancy. The active metabolite is also found in breast milk.
Cyclosporine Counseling:  I discussed with the patient the risks of cyclosporine including but not limited to hypertension, gingival hyperplasia,myelosuppression, immunosuppression, liver damage, kidney damage, neurotoxicity, lymphoma, and serious infections. The patient understands that monitoring is required including baseline blood pressure, CBC, CMP, lipid panel and uric acid, and then 1-2 times monthly CMP and blood pressure.
Terbinafine Pregnancy And Lactation Text: This medication is Pregnancy Category B and is considered safe during pregnancy. It is also excreted in breast milk and breast feeding isn't recommended.
Minocycline Counseling: Patient advised regarding possible photosensitivity and discoloration of the teeth, skin, lips, tongue and gums.  Patient instructed to avoid sunlight, if possible.  When exposed to sunlight, patients should wear protective clothing, sunglasses, and sunscreen.  The patient was instructed to call the office immediately if the following severe adverse effects occur:  hearing changes, easy bruising/bleeding, severe headache, or vision changes.  The patient verbalized understanding of the proper use and possible adverse effects of minocycline.  All of the patient's questions and concerns were addressed.
Tremfya Counseling: I discussed with the patient the risks of guselkumab including but not limited to immunosuppression, serious infections, and drug reactions.  The patient understands that monitoring is required including a PPD at baseline and must alert us or the primary physician if symptoms of infection or other concerning signs are noted.
Thalidomide Pregnancy And Lactation Text: This medication is Pregnancy Category X and is absolutely contraindicated during pregnancy. It is unknown if it is excreted in breast milk.
Eucrisa Counseling: Patient may experience a mild burning sensation during topical application. Eucrisa is not approved in children less than 2 years of age.
Metronidazole Counseling:  I discussed with the patient the risks of metronidazole including but not limited to seizures, nausea/vomiting, a metallic taste in the mouth, nausea/vomiting and severe allergy.
Erythromycin Counseling:  I discussed with the patient the risks of erythromycin including but not limited to GI upset, allergic reaction, drug rash, diarrhea, increase in liver enzymes, and yeast infections.
High Dose Vitamin A Counseling: Side effects reviewed, pt to contact office should one occur.
Griseofulvin Pregnancy And Lactation Text: This medication is Pregnancy Category X and is known to cause serious birth defects. It is unknown if this medication is excreted in breast milk but breast feeding should be avoided.
Cimetidine Counseling:  I discussed with the patient the risks of Cimetidine including but not limited to gynecomastia, headache, diarrhea, nausea, drowsiness, arrhythmias, pancreatitis, skin rashes, psychosis, bone marrow suppression and kidney toxicity.
Carac Counseling:  I discussed with the patient the risks of Carac including but not limited to erythema, scaling, itching, weeping, crusting, and pain.
Tazorac Counseling:  Patient advised that medication is irritating and drying.  Patient may need to apply sparingly and wash off after an hour before eventually leaving it on overnight.  The patient verbalized understanding of the proper use and possible adverse effects of tazorac.  All of the patient's questions and concerns were addressed.
Clindamycin Counseling: I counseled the patient regarding use of clindamycin as an antibiotic for prophylactic and/or therapeutic purposes. Clindamycin is active against numerous classes of bacteria, including skin bacteria. Side effects may include nausea, diarrhea, gastrointestinal upset, rash, hives, yeast infections, and in rare cases, colitis.
Azathioprine Counseling:  I discussed with the patient the risks of azathioprine including but not limited to myelosuppression, immunosuppression, hepatotoxicity, lymphoma, and infections.  The patient understands that monitoring is required including baseline LFTs, Creatinine, possible TPMP genotyping and weekly CBCs for the first month and then every 2 weeks thereafter.  The patient verbalized understanding of the proper use and possible adverse effects of azathioprine.  All of the patient's questions and concerns were addressed.
Siliq Counseling:  I discussed with the patient the risks of Siliq including but not limited to new or worsening depression, suicidal thoughts and behavior, immunosuppression, malignancy, posterior leukoencephalopathy syndrome, and serious infections.  The patient understands that monitoring is required including a PPD at baseline and must alert us or the primary physician if symptoms of infection or other concerning signs are noted. There is also a special program designed to monitor depression which is required with Siliq.
SSKI Counseling:  I discussed with the patient the risks of SSKI including but not limited to thyroid abnormalities, metallic taste, GI upset, fever, headache, acne, arthralgias, paraesthesias, lymphadenopathy, easy bleeding, arrhythmias, and allergic reaction.
Cephalexin Counseling: I counseled the patient regarding use of cephalexin as an antibiotic for prophylactic and/or therapeutic purposes. Cephalexin (commonly prescribed under brand name Keflex) is a cephalosporin antibiotic which is active against numerous classes of bacteria, including most skin bacteria. Side effects may include nausea, diarrhea, gastrointestinal upset, rash, hives, yeast infections, and in rare cases, hepatitis, kidney disease, seizures, fever, confusion, neurologic symptoms, and others. Patients with severe allergies to penicillin medications are cautioned that there is about a 10% incidence of cross-reactivity with cephalosporins. When possible, patients with penicillin allergies should use alternatives to cephalosporins for antibiotic therapy.
Hydroquinone Counseling:  Patient advised that medication may result in skin irritation, lightening (hypopigmentation), dryness, and burning.  In the event of skin irritation, the patient was advised to reduce the amount of the drug applied or use it less frequently.  Rarely, spots that are treated with hydroquinone can become darker (pseudoochronosis).  Should this occur, patient instructed to stop medication and call the office. The patient verbalized understanding of the proper use and possible adverse effects of hydroquinone.  All of the patient's questions and concerns were addressed.
Metronidazole Pregnancy And Lactation Text: This medication is Pregnancy Category B and considered safe during pregnancy.  It is also excreted in breast milk.
Topical Clindamycin Pregnancy And Lactation Text: This medication is Pregnancy Category B and is considered safe during pregnancy. It is unknown if it is excreted in breast milk.
Solaraze Counseling:  I discussed with the patient the risks of Solaraze including but not limited to erythema, scaling, itching, weeping, crusting, and pain.
Isotretinoin Pregnancy And Lactation Text: This medication is Pregnancy Category X and is considered extremely dangerous during pregnancy. It is unknown if it is excreted in breast milk.
Albendazole Counseling:  I discussed with the patient the risks of albendazole including but not limited to cytopenia, kidney damage, nausea/vomiting and severe allergy.  The patient understands that this medication is being used in an off-label manner.
Bexarotene Pregnancy And Lactation Text: This medication is Pregnancy Category X and should not be given to women who are pregnant or may become pregnant. This medication should not be used if you are breast feeding.
Stelara Counseling:  I discussed with the patient the risks of ustekinumab including but not limited to immunosuppression, malignancy, posterior leukoencephalopathy syndrome, and serious infections.  The patient understands that monitoring is required including a PPD at baseline and must alert us or the primary physician if symptoms of infection or other concerning signs are noted.
Tetracycline Pregnancy And Lactation Text: This medication is Pregnancy Category D and not consider safe during pregnancy. It is also excreted in breast milk.
Glycopyrrolate Counseling:  I discussed with the patient the risks of glycopyrrolate including but not limited to skin rash, drowsiness, dry mouth, difficulty urinating, and blurred vision.
Imiquimod Counseling:  I discussed with the patient the risks of imiquimod including but not limited to erythema, scaling, itching, weeping, crusting, and pain.  Patient understands that the inflammatory response to imiquimod is variable from person to person and was educated regarded proper titration schedule.  If flu-like symptoms develop, patient knows to discontinue the medication and contact us.
Bexarotene Counseling:  I discussed with the patient the risks of bexarotene including but not limited to hair loss, dry lips/skin/eyes, liver abnormalities, hyperlipidemia, pancreatitis, depression/suicidal ideation, photosensitivity, drug rash/allergic reactions, hypothyroidism, anemia, leukopenia, infection, cataracts, and teratogenicity.  Patient understands that they will need regular blood tests to check lipid profile, liver function tests, white blood cell count, thyroid function tests and pregnancy test if applicable.
Rifampin Counseling: I discussed with the patient the risks of rifampin including but not limited to liver damage, kidney damage, red-orange body fluids, nausea/vomiting and severe allergy.
Valtrex Pregnancy And Lactation Text: this medication is Pregnancy Category B and is considered safe during pregnancy. This medication is not directly found in breast milk but it's metabolite acyclovir is present.
Picato Counseling:  I discussed with the patient the risks of Picato including but not limited to erythema, scaling, itching, weeping, crusting, and pain.
Dapsone Pregnancy And Lactation Text: This medication is Pregnancy Category C and is not considered safe during pregnancy or breast feeding.
Dupixent Counseling: I discussed with the patient the risks of dupilumab including but not limited to eye infection and irritation, cold sores, injection site reactions, worsening of asthma, allergic reactions and increased risk of parasitic infection.  Live vaccines should be avoided while taking dupilumab. Dupilumab will also interact with certain medications such as warfarin and cyclosporine. The patient understands that monitoring is required and they must alert us or the primary physician if symptoms of infection or other concerning signs are noted.
Minoxidil Counseling: Minoxidil is a topical medication which can increase blood flow where it is applied. It is uncertain how this medication increases hair growth. Side effects are uncommon and include stinging and allergic reactions.
Drysol Counseling:  I discussed with the patient the risks of drysol/aluminum chloride including but not limited to skin rash, itching, irritation, burning.
Erivedge Counseling- I discussed with the patient the risks of Erivedge including but not limited to nausea, vomiting, diarrhea, constipation, weight loss, changes in the sense of taste, decreased appetite, muscle spasms, and hair loss.  The patient verbalized understanding of the proper use and possible adverse effects of Erivedge.  All of the patient's questions and concerns were addressed.
Gabapentin Pregnancy And Lactation Text: This medication is Pregnancy Category C and isn't considered safe during pregnancy. It is excreted in breast milk.
Itraconazole Counseling:  I discussed with the patient the risks of itraconazole including but not limited to liver damage, nausea/vomiting, neuropathy, and severe allergy.  The patient understands that this medication is best absorbed when taken with acidic beverages such as non-diet cola or ginger ale.  The patient understands that monitoring is required including baseline LFTs and repeat LFTs at intervals.  The patient understands that they are to contact us or the primary physician if concerning signs are noted.
Topical Sulfur Applications Counseling: Topical Sulfur Counseling: Patient counseled that this medication may cause skin irritation or allergic reactions.  In the event of skin irritation, the patient was advised to reduce the amount of the drug applied or use it less frequently.   The patient verbalized understanding of the proper use and possible adverse effects of topical sulfur application.  All of the patient's questions and concerns were addressed.
Cimzia Counseling:  I discussed with the patient the risks of Cimzia including but not limited to immunosuppression, allergic reactions and infections.  The patient understands that monitoring is required including a PPD at baseline and must alert us or the primary physician if symptoms of infection or other concerning signs are noted.
Topical Retinoid counseling:  Patient advised to apply a pea-sized amount only at bedtime and wait 30 minutes after washing their face before applying.  If too drying, patient may add a non-comedogenic moisturizer. The patient verbalized understanding of the proper use and possible adverse effects of retinoids.  All of the patient's questions and concerns were addressed.
Topical Clindamycin Counseling: Patient counseled that this medication may cause skin irritation or allergic reactions.  In the event of skin irritation, the patient was advised to reduce the amount of the drug applied or use it less frequently.   The patient verbalized understanding of the proper use and possible adverse effects of clindamycin.  All of the patient's questions and concerns were addressed.
Zyclara Counseling:  I discussed with the patient the risks of imiquimod including but not limited to erythema, scaling, itching, weeping, crusting, and pain.  Patient understands that the inflammatory response to imiquimod is variable from person to person and was educated regarded proper titration schedule.  If flu-like symptoms develop, patient knows to discontinue the medication and contact us.
Methotrexate Counseling:  Patient counseled regarding adverse effects of methotrexate including but not limited to nausea, vomiting, abnormalities in liver function tests. Patients may develop mouth sores, rash, diarrhea, and abnormalities in blood counts. The patient understands that monitoring is required including LFT's and blood counts.  There is a rare possibility of scarring of the liver and lung problems that can occur when taking methotrexate. Persistent nausea, loss of appetite, pale stools, dark urine, cough, and shortness of breath should be reported immediately. Patient advised to discontinue methotrexate treatment at least three months before attempting to become pregnant.  I discussed the need for folate supplements while taking methotrexate.  These supplements can decrease side effects during methotrexate treatment. The patient verbalized understanding of the proper use and possible adverse effects of methotrexate.  All of the patient's questions and concerns were addressed.
Rituxan Counseling:  I discussed with the patient the risks of Rituxan infusions. Side effects can include infusion reactions, severe drug rashes including mucocutaneous reactions, reactivation of latent hepatitis and other infections and rarely progressive multifocal leukoencephalopathy.  All of the patient's questions and concerns were addressed.
Prednisone Counseling:  I discussed with the patient the risks of prolonged use of prednisone including but not limited to weight gain, insomnia, osteoporosis, mood changes, diabetes, susceptibility to infection, glaucoma and high blood pressure.  In cases where prednisone use is prolonged, patients should be monitored with blood pressure checks, serum glucose levels and an eye exam.  Additionally, the patient may need to be placed on GI prophylaxis, PCP prophylaxis, and calcium and vitamin D supplementation and/or a bisphosphonate.  The patient verbalized understanding of the proper use and the possible adverse effects of prednisone.  All of the patient's questions and concerns were addressed.
Hydroxyzine Pregnancy And Lactation Text: This medication is not safe during pregnancy and should not be taken. It is also excreted in breast milk and breast feeding isn't recommended.
Elidel Counseling: Patient may experience a mild burning sensation during topical application. Elidel is not approved in children less than 2 years of age. There have been case reports of hematologic and skin malignancies in patients using topical calcineurin inhibitors although causality is questionable.
Clindamycin Pregnancy And Lactation Text: This medication can be used in pregnancy if certain situations. Clindamycin is also present in breast milk.
Tetracycline Counseling: Patient counseled regarding possible photosensitivity and increased risk for sunburn.  Patient instructed to avoid sunlight, if possible.  When exposed to sunlight, patients should wear protective clothing, sunglasses, and sunscreen.  The patient was instructed to call the office immediately if the following severe adverse effects occur:  hearing changes, easy bruising/bleeding, severe headache, or vision changes.  The patient verbalized understanding of the proper use and possible adverse effects of tetracycline.  All of the patient's questions and concerns were addressed. Patient understands to avoid pregnancy while on therapy due to potential birth defects.
Rituxan Pregnancy And Lactation Text: This medication is Pregnancy Category C and it isn't know if it is safe during pregnancy. It is unknown if this medication is excreted in breast milk but similar antibodies are known to be excreted.
Benzoyl Peroxide Pregnancy And Lactation Text: This medication is Pregnancy Category C. It is unknown if benzoyl peroxide is excreted in breast milk.
Hydroxychloroquine Counseling:  I discussed with the patient that a baseline ophthalmologic exam is needed at the start of therapy and every year thereafter while on therapy. A CBC may also be warranted for monitoring.  The side effects of this medication were discussed with the patient, including but not limited to agranulocytosis, aplastic anemia, seizures, rashes, retinopathy, and liver toxicity. Patient instructed to call the office should any adverse effect occur.  The patient verbalized understanding of the proper use and possible adverse effects of Plaquenil.  All the patient's questions and concerns were addressed.
Drysol Pregnancy And Lactation Text: This medication is considered safe during pregnancy and breast feeding.
Spironolactone Counseling: Patient advised regarding risks of diarrhea, abdominal pain, hyperkalemia, birth defects (for female patients), liver toxicity and renal toxicity. The patient may need blood work to monitor liver and kidney function and potassium levels while on therapy. The patient verbalized understanding of the proper use and possible adverse effects of spironolactone.  All of the patient's questions and concerns were addressed.
Valtrex Counseling: I discussed with the patient the risks of valacyclovir including but not limited to kidney damage, nausea, vomiting and severe allergy.  The patient understands that if the infection seems to be worsening or is not improving, they are to call.
Protopic Pregnancy And Lactation Text: This medication is Pregnancy Category C. It is unknown if this medication is excreted in breast milk when applied topically.
Prednisone Pregnancy And Lactation Text: This medication is Pregnancy Category C and it isn't know if it is safe during pregnancy. This medication is excreted in breast milk.
Cephalexin Pregnancy And Lactation Text: This medication is Pregnancy Category B and considered safe during pregnancy.  It is also excreted in breast milk but can be used safely for shorter doses.
Humira Counseling:  I discussed with the patient the risks of adalimumab including but not limited to myelosuppression, immunosuppression, autoimmune hepatitis, demyelinating diseases, lymphoma, and serious infections.  The patient understands that monitoring is required including a PPD at baseline and must alert us or the primary physician if symptoms of infection or other concerning signs are noted.
Cyclophosphamide Pregnancy And Lactation Text: This medication is Pregnancy Category D and it isn't considered safe during pregnancy. This medication is excreted in breast milk.
Protopic Counseling: Patient may experience a mild burning sensation during topical application. Protopic is not approved in children less than 2 years of age. There have been case reports of hematologic and skin malignancies in patients using topical calcineurin inhibitors although causality is questionable.
Arava Counseling:  Patient counseled regarding adverse effects of Arava including but not limited to nausea, vomiting, abnormalities in liver function tests. Patients may develop mouth sores, rash, diarrhea, and abnormalities in blood counts. The patient understands that monitoring is required including LFTs and blood counts.  There is a rare possibility of scarring of the liver and lung problems that can occur when taking methotrexate. Persistent nausea, loss of appetite, pale stools, dark urine, cough, and shortness of breath should be reported immediately. Patient advised to discontinue Arava treatment and consult with a physician prior to attempting conception. The patient will have to undergo a treatment to eliminate Arava from the body prior to conception.
Xeljanz Counseling: I discussed with the patient the risks of Xeljanz therapy including increased risk of infection, liver issues, headache, diarrhea, or cold symptoms. Live vaccines should be avoided. They were instructed to call if they have any problems.
Ilumya Counseling: I discussed with the patient the risks of tildrakizumab including but not limited to immunosuppression, malignancy, posterior leukoencephalopathy syndrome, and serious infections.  The patient understands that monitoring is required including a PPD at baseline and must alert us or the primary physician if symptoms of infection or other concerning signs are noted.
Hydroquinone Pregnancy And Lactation Text: This medication has not been assigned a Pregnancy Risk Category but animal studies failed to show danger with the topical medication. It is unknown if the medication is excreted in breast milk.
Ketoconazole Counseling:   Patient counseled regarding improving absorption with orange juice.  Adverse effects include but are not limited to breast enlargement, headache, diarrhea, nausea, upset stomach, liver function test abnormalities, taste disturbance, and stomach pain.  There is a rare possibility of liver failure that can occur when taking ketoconazole. The patient understands that monitoring of LFTs may be required, especially at baseline. The patient verbalized understanding of the proper use and possible adverse effects of ketoconazole.  All of the patient's questions and concerns were addressed.
Thalidomide Counseling: I discussed with the patient the risks of thalidomide including but not limited to birth defects, anxiety, weakness, chest pain, dizziness, cough and severe allergy.
Use Enhanced Medication Counseling?: No
Griseofulvin Counseling:  I discussed with the patient the risks of griseofulvin including but not limited to photosensitivity, cytopenia, liver damage, nausea/vomiting and severe allergy.  The patient understands that this medication is best absorbed when taken with a fatty meal (e.g., ice cream or french fries).
Xolair Counseling:  Patient informed of potential adverse effects including but not limited to fever, muscle aches, rash and allergic reactions.  The patient verbalized understanding of the proper use and possible adverse effects of Xolair.  All of the patient's questions and concerns were addressed.
Odomzo Counseling- I discussed with the patient the risks of Odomzo including but not limited to nausea, vomiting, diarrhea, constipation, weight loss, changes in the sense of taste, decreased appetite, muscle spasms, and hair loss.  The patient verbalized understanding of the proper use and possible adverse effects of Odomzo.  All of the patient's questions and concerns were addressed.
Albendazole Pregnancy And Lactation Text: This medication is Pregnancy Category C and it isn't known if it is safe during pregnancy. It is also excreted in breast milk.
Tazorac Pregnancy And Lactation Text: This medication is not safe during pregnancy. It is unknown if this medication is excreted in breast milk.
Doxycycline Pregnancy And Lactation Text: This medication is Pregnancy Category D and not consider safe during pregnancy. It is also excreted in breast milk but is considered safe for shorter treatment courses.
Otezla Pregnancy And Lactation Text: This medication is Pregnancy Category C and it isn't known if it is safe during pregnancy. It is unknown if it is excreted in breast milk.
Topical Sulfur Applications Pregnancy And Lactation Text: This medication is Pregnancy Category C and has an unknown safety profile during pregnancy. It is unknown if this topical medication is excreted in breast milk.
Acitretin Counseling:  I discussed with the patient the risks of acitretin including but not limited to hair loss, dry lips/skin/eyes, liver damage, hyperlipidemia, depression/suicidal ideation, photosensitivity.  Serious rare side effects can include but are not limited to pancreatitis, pseudotumor cerebri, bony changes, clot formation/stroke/heart attack.  Patient understands that alcohol is contraindicated since it can result in liver toxicity and significantly prolong the elimination of the drug by many years.
Sski Pregnancy And Lactation Text: This medication is Pregnancy Category D and isn't considered safe during pregnancy. It is excreted in breast milk.
Enbrel Counseling:  I discussed with the patient the risks of etanercept including but not limited to myelosuppression, immunosuppression, autoimmune hepatitis, demyelinating diseases, lymphoma, and infections.  The patient understands that monitoring is required including a PPD at baseline and must alert us or the primary physician if symptoms of infection or other concerning signs are noted.
Doxepin Pregnancy And Lactation Text: This medication is Pregnancy Category C and it isn't known if it is safe during pregnancy. It is also excreted in breast milk and breast feeding isn't recommended.
Skyrizi Counseling: I discussed with the patient the risks of risankizumab-rzaa including but not limited to immunosuppression, and serious infections.  The patient understands that monitoring is required including a PPD at baseline and must alert us or the primary physician if symptoms of infection or other concerning signs are noted.
Erythromycin Pregnancy And Lactation Text: This medication is Pregnancy Category B and is considered safe during pregnancy. It is also excreted in breast milk.
Gabapentin Counseling: I discussed with the patient the risks of gabapentin including but not limited to dizziness, somnolence, fatigue and ataxia.
Birth Control Pills Pregnancy And Lactation Text: This medication should be avoided if pregnant and for the first 30 days post-partum.
Hydroxychloroquine Pregnancy And Lactation Text: This medication has been shown to cause fetal harm but it isn't assigned a Pregnancy Risk Category. There are small amounts excreted in breast milk.
Bactrim Counseling:  I discussed with the patient the risks of sulfa antibiotics including but not limited to GI upset, allergic reaction, drug rash, diarrhea, dizziness, photosensitivity, and yeast infections.  Rarely, more serious reactions can occur including but not limited to aplastic anemia, agranulocytosis, methemoglobinemia, blood dyscrasias, liver or kidney failure, lung infiltrates or desquamative/blistering drug rashes.
Cellcept Pregnancy And Lactation Text: This medication is Pregnancy Category D and isn't considered safe during pregnancy. It is unknown if this medication is excreted in breast milk.
Xolair Pregnancy And Lactation Text: This medication is Pregnancy Category B and is considered safe during pregnancy. This medication is excreted in breast milk.
Clofazimine Counseling:  I discussed with the patient the risks of clofazimine including but not limited to skin and eye pigmentation, liver damage, nausea/vomiting, gastrointestinal bleeding and allergy.
Cellcept Counseling:  I discussed with the patient the risks of mycophenolate mofetil including but not limited to infection/immunosuppression, GI upset, hypokalemia, hypercholesterolemia, bone marrow suppression, lymphoproliferative disorders, malignancy, GI ulceration/bleed/perforation, colitis, interstitial lung disease, kidney failure, progressive multifocal leukoencephalopathy, and birth defects.  The patient understands that monitoring is required including a baseline creatinine and regular CBC testing. In addition, patient must alert us immediately if symptoms of infection or other concerning signs are noted.
Xelmicaelaz Pregnancy And Lactation Text: This medication is Pregnancy Category D and is not considered safe during pregnancy.  The risk during breast feeding is also uncertain.
High Dose Vitamin A Pregnancy And Lactation Text: High dose vitamin A therapy is contraindicated during pregnancy and breast feeding.
Oxybutynin Counseling:  I discussed with the patient the risks of oxybutynin including but not limited to skin rash, drowsiness, dry mouth, difficulty urinating, and blurred vision.
Fluconazole Counseling:  Patient counseled regarding adverse effects of fluconazole including but not limited to headache, diarrhea, nausea, upset stomach, liver function test abnormalities, taste disturbance, and stomach pain.  There is a rare possibility of liver failure that can occur when taking fluconazole.  The patient understands that monitoring of LFTs and kidney function test may be required, especially at baseline. The patient verbalized understanding of the proper use and possible adverse effects of fluconazole.  All of the patient's questions and concerns were addressed.
Methotrexate Pregnancy And Lactation Text: This medication is Pregnancy Category X and is known to cause fetal harm. This medication is excreted in breast milk.
Cosentyx Counseling:  I discussed with the patient the risks of Cosentyx including but not limited to worsening of Crohn's disease, immunosuppression, allergic reactions and infections.  The patient understands that monitoring is required including a PPD at baseline and must alert us or the primary physician if symptoms of infection or other concerning signs are noted.
Birth Control Pills Counseling: Birth Control Pill Counseling: I discussed with the patient the potential side effects of OCPs including but not limited to increased risk of stroke, heart attack, thrombophlebitis, deep venous thrombosis, hepatic adenomas, breast changes, GI upset, headaches, and depression.  The patient verbalized understanding of the proper use and possible adverse effects of OCPs. All of the patient's questions and concerns were addressed.
Doxycycline Counseling:  Patient counseled regarding possible photosensitivity and increased risk for sunburn.  Patient instructed to avoid sunlight, if possible.  When exposed to sunlight, patients should wear protective clothing, sunglasses, and sunscreen.  The patient was instructed to call the office immediately if the following severe adverse effects occur:  hearing changes, easy bruising/bleeding, severe headache, or vision changes.  The patient verbalized understanding of the proper use and possible adverse effects of doxycycline.  All of the patient's questions and concerns were addressed.
Azithromycin Counseling:  I discussed with the patient the risks of azithromycin including but not limited to GI upset, allergic reaction, drug rash, diarrhea, and yeast infections.
Nsaids Counseling: NSAID Counseling: I discussed with the patient that NSAIDs should be taken with food. Prolonged use of NSAIDs can result in the development of stomach ulcers.  Patient advised to stop taking NSAIDs if abdominal pain occurs.  The patient verbalized understanding of the proper use and possible adverse effects of NSAIDs.  All of the patient's questions and concerns were addressed.
Benzoyl Peroxide Counseling: Patient counseled that medicine may cause skin irritation and bleach clothing.  In the event of skin irritation, the patient was advised to reduce the amount of the drug applied or use it less frequently.   The patient verbalized understanding of the proper use and possible adverse effects of benzoyl peroxide.  All of the patient's questions and concerns were addressed.
Cyclophosphamide Counseling:  I discussed with the patient the risks of cyclophosphamide including but not limited to hair loss, hormonal abnormalities, decreased fertility, abdominal pain, diarrhea, nausea and vomiting, bone marrow suppression and infection. The patient understands that monitoring is required while taking this medication.
Cimzia Pregnancy And Lactation Text: This medication crosses the placenta but can be considered safe in certain situations. Cimzia may be excreted in breast milk.
Ivermectin Counseling:  Patient instructed to take medication on an empty stomach with a full glass of water.  Patient informed of potential adverse effects including but not limited to nausea, diarrhea, dizziness, itching, and swelling of the extremities or lymph nodes.  The patient verbalized understanding of the proper use and possible adverse effects of ivermectin.  All of the patient's questions and concerns were addressed.
Nsaids Pregnancy And Lactation Text: These medications are considered safe up to 30 weeks gestation. It is excreted in breast milk.
5-Fu Counseling: 5-Fluorouracil Counseling:  I discussed with the patient the risks of 5-fluorouracil including but not limited to erythema, scaling, itching, weeping, crusting, and pain.
Solaraze Pregnancy And Lactation Text: This medication is Pregnancy Category B and is considered safe. There is some data to suggest avoiding during the third trimester. It is unknown if this medication is excreted in breast milk.

## 2021-11-11 NOTE — PROCEDURE: COUNSELING
Bpo Recommendations: Recommend panoxyl or cerave 4% BPO wash on face for 30-45 seconds daily or as tolerated (reduce use if drying/irritating to face). Can bleach fabrics/hair.  Recommend 10% BPO wash (i.e panoxyl) for body for 2-3 minutes daily, adjusting frequency/duration as tolerated.
Minocycline Counseling: Patient advised regarding possible photosensitivity and discoloration of the teeth, skin, lips, tongue and gums.  Patient instructed to avoid sunlight, if possible.  When exposed to sunlight, patients should wear protective clothing, sunglasses, and sunscreen.  The patient was instructed to call the office immediately if the following severe adverse effects occur:  hearing changes, easy bruising/bleeding, severe headache, or vision changes.  The patient verbalized understanding of the proper use and possible adverse effects of minocycline.  All of the patient's questions and concerns were addressed.
Topical Retinoid counseling:  Patient advised to apply a pea-sized amount only at bedtime and wait 30 minutes after washing their face before applying.  If too drying, patient may add a non-comedogenic moisturizer. The patient verbalized understanding of the proper use and possible adverse effects of retinoids.  All of the patient's questions and concerns were addressed.
Detail Level: Detailed
Topical Sulfur Applications Counseling: Topical Sulfur Counseling: Patient counseled that this medication may cause skin irritation or allergic reactions.  In the event of skin irritation, the patient was advised to reduce the amount of the drug applied or use it less frequently.   The patient verbalized understanding of the proper use and possible adverse effects of topical sulfur application.  All of the patient's questions and concerns were addressed.
Benzoyl Peroxide Counseling: Patient counseled that medicine may cause skin irritation and bleach clothing.  In the event of skin irritation, the patient was advised to reduce the amount of the drug applied or use it less frequently.   The patient verbalized understanding of the proper use and possible adverse effects of benzoyl peroxide.  All of the patient's questions and concerns were addressed.
Isotretinoin Counseling: Patient should get monthly blood tests, not donate blood, not drive at night if vision affected, not share medication, and not undergo elective surgery for 6 months after tx completed. Side effects reviewed, pt to contact office should one occur.
Bactrim Pregnancy And Lactation Text: This medication is Pregnancy Category D and is known to cause fetal risk.  It is also excreted in breast milk.
Bactrim Counseling:  I discussed with the patient the risks of sulfa antibiotics including but not limited to GI upset, allergic reaction, drug rash, diarrhea, dizziness, photosensitivity, and yeast infections.  Rarely, more serious reactions can occur including but not limited to aplastic anemia, agranulocytosis, methemoglobinemia, blood dyscrasias, liver or kidney failure, lung infiltrates or desquamative/blistering drug rashes.
Birth Control Pills Counseling: Birth Control Pill Counseling: I discussed with the patient the potential side effects of OCPs including but not limited to increased risk of stroke, heart attack, thrombophlebitis, deep venous thrombosis, hepatic adenomas, breast changes, GI upset, headaches, and depression.  The patient verbalized understanding of the proper use and possible adverse effects of OCPs. All of the patient's questions and concerns were addressed.
Minocycline Pregnancy And Lactation Text: This medication is Pregnancy Category D and not consider safe during pregnancy. It is also excreted in breast milk.
Sarecycline Counseling: Patient advised regarding possible photosensitivity and discoloration of the teeth, skin, lips, tongue and gums.  Patient instructed to avoid sunlight, if possible.  When exposed to sunlight, patients should wear protective clothing, sunglasses, and sunscreen.  The patient was instructed to call the office immediately if the following severe adverse effects occur:  hearing changes, easy bruising/bleeding, severe headache, or vision changes.  The patient verbalized understanding of the proper use and possible adverse effects of sarecycline.  All of the patient's questions and concerns were addressed.
Topical Retinoid Pregnancy And Lactation Text: This medication is Pregnancy Category C. It is unknown if this medication is excreted in breast milk.
Tetracycline Counseling: Patient counseled regarding possible photosensitivity and increased risk for sunburn.  Patient instructed to avoid sunlight, if possible.  When exposed to sunlight, patients should wear protective clothing, sunglasses, and sunscreen.  The patient was instructed to call the office immediately if the following severe adverse effects occur:  hearing changes, easy bruising/bleeding, severe headache, or vision changes.  The patient verbalized understanding of the proper use and possible adverse effects of tetracycline.  All of the patient's questions and concerns were addressed. Patient understands to avoid pregnancy while on therapy due to potential birth defects.
Spironolactone Counseling: Patient advised regarding risks of diarrhea, abdominal pain, hyperkalemia, birth defects (for female patients), liver toxicity and renal toxicity. The patient may need blood work to monitor liver and kidney function and potassium levels while on therapy. The patient verbalized understanding of the proper use and possible adverse effects of spironolactone.  All of the patient's questions and concerns were addressed.
Topical Clindamycin Pregnancy And Lactation Text: This medication is Pregnancy Category B and is considered safe during pregnancy. It is unknown if it is excreted in breast milk.
Topical Sulfur Applications Pregnancy And Lactation Text: This medication is Pregnancy Category C and has an unknown safety profile during pregnancy. It is unknown if this topical medication is excreted in breast milk.
Dapsone Pregnancy And Lactation Text: This medication is Pregnancy Category C and is not considered safe during pregnancy or breast feeding.
Doxycycline Pregnancy And Lactation Text: This medication is Pregnancy Category D and not consider safe during pregnancy. It is also excreted in breast milk but is considered safe for shorter treatment courses.
Use Enhanced Medication Counseling?: No
Dapsone Counseling: I discussed with the patient the risks of dapsone including but not limited to hemolytic anemia, agranulocytosis, rashes, methemoglobinemia, kidney failure, peripheral neuropathy, headaches, GI upset, and liver toxicity.  Patients who start dapsone require monitoring including baseline LFTs and weekly CBCs for the first month, then every month thereafter.  The patient verbalized understanding of the proper use and possible adverse effects of dapsone.  All of the patient's questions and concerns were addressed.
Azithromycin Pregnancy And Lactation Text: This medication is considered safe during pregnancy and is also secreted in breast milk.
Isotretinoin Pregnancy And Lactation Text: This medication is Pregnancy Category X and is considered extremely dangerous during pregnancy. It is unknown if it is excreted in breast milk.
Azithromycin Counseling:  I discussed with the patient the risks of azithromycin including but not limited to GI upset, allergic reaction, drug rash, diarrhea, and yeast infections.
Topical Clindamycin Counseling: Patient counseled that this medication may cause skin irritation or allergic reactions.  In the event of skin irritation, the patient was advised to reduce the amount of the drug applied or use it less frequently.   The patient verbalized understanding of the proper use and possible adverse effects of clindamycin.  All of the patient's questions and concerns were addressed.
Topical Retinoids Recommendations: Rec OTC adapalene gel 0.1% (i.e differin) if Rx is not covered.
Tazorac Counseling:  Patient advised that medication is irritating and drying.  Patient may need to apply sparingly and wash off after an hour before eventually leaving it on overnight.  The patient verbalized understanding of the proper use and possible adverse effects of tazorac.  All of the patient's questions and concerns were addressed.
Birth Control Pills Pregnancy And Lactation Text: This medication should be avoided if pregnant and for the first 30 days post-partum.
Erythromycin Counseling:  I discussed with the patient the risks of erythromycin including but not limited to GI upset, allergic reaction, drug rash, diarrhea, increase in liver enzymes, and yeast infections.
Benzoyl Peroxide Pregnancy And Lactation Text: This medication is Pregnancy Category C. It is unknown if benzoyl peroxide is excreted in breast milk.
Erythromycin Pregnancy And Lactation Text: This medication is Pregnancy Category B and is considered safe during pregnancy. It is also excreted in breast milk.
High Dose Vitamin A Counseling: Side effects reviewed, pt to contact office should one occur.
Tazorac Pregnancy And Lactation Text: This medication is not safe during pregnancy. It is unknown if this medication is excreted in breast milk.
High Dose Vitamin A Pregnancy And Lactation Text: High dose vitamin A therapy is contraindicated during pregnancy and breast feeding.
Spironolactone Pregnancy And Lactation Text: This medication can cause feminization of the male fetus and should be avoided during pregnancy. The active metabolite is also found in breast milk.
Doxycycline Counseling:  Patient counseled regarding possible photosensitivity and increased risk for sunburn.  Patient instructed to avoid sunlight, if possible.  When exposed to sunlight, patients should wear protective clothing, sunglasses, and sunscreen.  The patient was instructed to call the office immediately if the following severe adverse effects occur:  hearing changes, easy bruising/bleeding, severe headache, or vision changes.  The patient verbalized understanding of the proper use and possible adverse effects of doxycycline.  All of the patient's questions and concerns were addressed.

## 2021-11-16 RX ORDER — TRETINOIN 0.05 G/100G
GEL TOPICAL
COMMUNITY
Start: 2021-11-11 | End: 2022-01-25 | Stop reason: ALTCHOICE

## 2021-11-24 ENCOUNTER — APPOINTMENT (OUTPATIENT)
Dept: URBAN - METROPOLITAN AREA CLINIC 321 | Age: 26
Setting detail: DERMATOLOGY
End: 2021-11-24

## 2021-11-24 DIAGNOSIS — Z79.899 OTHER LONG TERM (CURRENT) DRUG THERAPY: ICD-10-CM

## 2021-11-24 PROCEDURE — OTHER ORDER TESTS: OTHER

## 2021-12-10 ENCOUNTER — LAB ENCOUNTER (OUTPATIENT)
Dept: LAB | Age: 26
End: 2021-12-10
Attending: STUDENT IN AN ORGANIZED HEALTH CARE EDUCATION/TRAINING PROGRAM
Payer: MEDICAID

## 2021-12-10 DIAGNOSIS — Z79.899 ENCOUNTER FOR LONG-TERM (CURRENT) DRUG USE: Primary | ICD-10-CM

## 2021-12-10 PROCEDURE — 81025 URINE PREGNANCY TEST: CPT

## 2021-12-13 ENCOUNTER — APPOINTMENT (OUTPATIENT)
Dept: URBAN - METROPOLITAN AREA CLINIC 321 | Age: 26
Setting detail: DERMATOLOGY
End: 2021-12-13

## 2021-12-13 DIAGNOSIS — L70.0 ACNE VULGARIS: ICD-10-CM

## 2021-12-13 DIAGNOSIS — Z79.899 OTHER LONG TERM (CURRENT) DRUG THERAPY: ICD-10-CM

## 2021-12-13 PROCEDURE — OTHER PATIENT SPECIFIC COUNSELING: OTHER

## 2021-12-13 PROCEDURE — OTHER ISOTRETINOIN INITIATION: OTHER

## 2021-12-13 PROCEDURE — OTHER PRESCRIPTION: OTHER

## 2021-12-13 PROCEDURE — 99214 OFFICE O/P EST MOD 30 MIN: CPT

## 2021-12-13 PROCEDURE — OTHER HIGH RISK MEDICATION MONITORING: OTHER

## 2021-12-13 PROCEDURE — OTHER COUNSELING: OTHER

## 2021-12-13 PROCEDURE — OTHER ORDER TESTS: OTHER

## 2021-12-13 RX ORDER — ISOTRETINOIN 20 MG/1
CAPSULE, LIQUID FILLED ORAL
Qty: 30 | Refills: 0 | Status: ERX | COMMUNITY
Start: 2021-12-13

## 2021-12-13 NOTE — PROCEDURE: ORDER TESTS
Expected Date Of Service: 12/13/2021
Bill For Surgical Tray: no
Billing Type: Third-Party Bill
Clinical Notes (To The Lab): Pt must be fasting for lipid panel.

## 2021-12-13 NOTE — PROCEDURE: HIGH RISK MEDICATION MONITORING
Imiquimod Pregnancy And Lactation Text: This medication is Pregnancy Category C. It is unknown if this medication is excreted in breast milk.
Doxycycline Counseling:  Patient counseled regarding possible photosensitivity and increased risk for sunburn.  Patient instructed to avoid sunlight, if possible.  When exposed to sunlight, patients should wear protective clothing, sunglasses, and sunscreen.  The patient was instructed to call the office immediately if the following severe adverse effects occur:  hearing changes, easy bruising/bleeding, severe headache, or vision changes.  The patient verbalized understanding of the proper use and possible adverse effects of doxycycline.  All of the patient's questions and concerns were addressed.
Clofazimine Counseling:  I discussed with the patient the risks of clofazimine including but not limited to skin and eye pigmentation, liver damage, nausea/vomiting, gastrointestinal bleeding and allergy.
Griseofulvin Pregnancy And Lactation Text: This medication is Pregnancy Category X and is known to cause serious birth defects. It is unknown if this medication is excreted in breast milk but breast feeding should be avoided.
Ketoconazole Pregnancy And Lactation Text: This medication is Pregnancy Category C and it isn't know if it is safe during pregnancy. It is also excreted in breast milk and breast feeding isn't recommended.
Cellcept Pregnancy And Lactation Text: This medication is Pregnancy Category D and isn't considered safe during pregnancy. It is unknown if this medication is excreted in breast milk.
Hydroxyzine Counseling: Patient advised that the medication is sedating and not to drive a car after taking this medication.  Patient informed of potential adverse effects including but not limited to dry mouth, urinary retention, and blurry vision.  The patient verbalized understanding of the proper use and possible adverse effects of hydroxyzine.  All of the patient's questions and concerns were addressed.
Cyclosporine Pregnancy And Lactation Text: This medication is Pregnancy Category C and it isn't know if it is safe during pregnancy. This medication is excreted in breast milk.
Quinolones Pregnancy And Lactation Text: This medication is Pregnancy Category C and it isn't know if it is safe during pregnancy. It is also excreted in breast milk.
Elidel Counseling: Patient may experience a mild burning sensation during topical application. Elidel is not approved in children less than 2 years of age. There have been case reports of hematologic and skin malignancies in patients using topical calcineurin inhibitors although causality is questionable.
Otezla Counseling: The side effects of Otezla were discussed with the patient, including but not limited to worsening or new depression, weight loss, diarrhea, nausea, upper respiratory tract infection, and headache. Patient instructed to call the office should any adverse effect occur.  The patient verbalized understanding of the proper use and possible adverse effects of Otezla.  All the patient's questions and concerns were addressed.
Doxepin Counseling:  Patient advised that the medication is sedating and not to drive a car after taking this medication. Patient informed of potential adverse effects including but not limited to dry mouth, urinary retention, and blurry vision.  The patient verbalized understanding of the proper use and possible adverse effects of doxepin.  All of the patient's questions and concerns were addressed.
Acitretin Pregnancy And Lactation Text: This medication is Pregnancy Category X and should not be given to women who are pregnant or may become pregnant in the future. This medication is excreted in breast milk.
Albendazole Pregnancy And Lactation Text: This medication is Pregnancy Category C and it isn't known if it is safe during pregnancy. It is also excreted in breast milk.
Solaraze Pregnancy And Lactation Text: This medication is Pregnancy Category B and is considered safe. There is some data to suggest avoiding during the third trimester. It is unknown if this medication is excreted in breast milk.
Skyrizi Pregnancy And Lactation Text: The risk during pregnancy and breastfeeding is uncertain with this medication.
Minoxidil Counseling: Minoxidil is a topical medication which can increase blood flow where it is applied. It is uncertain how this medication increases hair growth. Side effects are uncommon and include stinging and allergic reactions.
Dupixent Pregnancy And Lactation Text: This medication likely crosses the placenta but the risk for the fetus is uncertain. This medication is excreted in breast milk.
Stelara Pregnancy And Lactation Text: This medication is Pregnancy Category B and is considered safe during pregnancy. It is unknown if this medication is excreted in breast milk.
Colchicine Pregnancy And Lactation Text: This medication is Pregnancy Category C and isn't considered safe during pregnancy. It is excreted in breast milk.
Azithromycin Pregnancy And Lactation Text: This medication is considered safe during pregnancy and is also secreted in breast milk.
Tetracycline Pregnancy And Lactation Text: This medication is Pregnancy Category D and not consider safe during pregnancy. It is also excreted in breast milk.
Fluconazole Counseling:  Patient counseled regarding adverse effects of fluconazole including but not limited to headache, diarrhea, nausea, upset stomach, liver function test abnormalities, taste disturbance, and stomach pain.  There is a rare possibility of liver failure that can occur when taking fluconazole.  The patient understands that monitoring of LFTs and kidney function test may be required, especially at baseline. The patient verbalized understanding of the proper use and possible adverse effects of fluconazole.  All of the patient's questions and concerns were addressed.
Sski Pregnancy And Lactation Text: This medication is Pregnancy Category D and isn't considered safe during pregnancy. It is excreted in breast milk.
Drysol Counseling:  I discussed with the patient the risks of drysol/aluminum chloride including but not limited to skin rash, itching, irritation, burning.
Xolair Counseling:  Patient informed of potential adverse effects including but not limited to fever, muscle aches, rash and allergic reactions.  The patient verbalized understanding of the proper use and possible adverse effects of Xolair.  All of the patient's questions and concerns were addressed.
Hydroquinone Pregnancy And Lactation Text: This medication has not been assigned a Pregnancy Risk Category but animal studies failed to show danger with the topical medication. It is unknown if the medication is excreted in breast milk.
Topical Sulfur Applications Counseling: Topical Sulfur Counseling: Patient counseled that this medication may cause skin irritation or allergic reactions.  In the event of skin irritation, the patient was advised to reduce the amount of the drug applied or use it less frequently.   The patient verbalized understanding of the proper use and possible adverse effects of topical sulfur application.  All of the patient's questions and concerns were addressed.
Prednisone Counseling:  I discussed with the patient the risks of prolonged use of prednisone including but not limited to weight gain, insomnia, osteoporosis, mood changes, diabetes, susceptibility to infection, glaucoma and high blood pressure.  In cases where prednisone use is prolonged, patients should be monitored with blood pressure checks, serum glucose levels and an eye exam.  Additionally, the patient may need to be placed on GI prophylaxis, PCP prophylaxis, and calcium and vitamin D supplementation and/or a bisphosphonate.  The patient verbalized understanding of the proper use and the possible adverse effects of prednisone.  All of the patient's questions and concerns were addressed.
Hydroxychloroquine Counseling:  I discussed with the patient that a baseline ophthalmologic exam is needed at the start of therapy and every year thereafter while on therapy. A CBC may also be warranted for monitoring.  The side effects of this medication were discussed with the patient, including but not limited to agranulocytosis, aplastic anemia, seizures, rashes, retinopathy, and liver toxicity. Patient instructed to call the office should any adverse effect occur.  The patient verbalized understanding of the proper use and possible adverse effects of Plaquenil.  All the patient's questions and concerns were addressed.
Albendazole Counseling:  I discussed with the patient the risks of albendazole including but not limited to cytopenia, kidney damage, nausea/vomiting and severe allergy.  The patient understands that this medication is being used in an off-label manner.
Rituxan Pregnancy And Lactation Text: This medication is Pregnancy Category C and it isn't know if it is safe during pregnancy. It is unknown if this medication is excreted in breast milk but similar antibodies are known to be excreted.
Birth Control Pills Counseling: Birth Control Pill Counseling: I discussed with the patient the potential side effects of OCPs including but not limited to increased risk of stroke, heart attack, thrombophlebitis, deep venous thrombosis, hepatic adenomas, breast changes, GI upset, headaches, and depression.  The patient verbalized understanding of the proper use and possible adverse effects of OCPs. All of the patient's questions and concerns were addressed.
Spironolactone Counseling: Patient advised regarding risks of diarrhea, abdominal pain, hyperkalemia, birth defects (for female patients), liver toxicity and renal toxicity. The patient may need blood work to monitor liver and kidney function and potassium levels while on therapy. The patient verbalized understanding of the proper use and possible adverse effects of spironolactone.  All of the patient's questions and concerns were addressed.
Tazorac Counseling:  Patient advised that medication is irritating and drying.  Patient may need to apply sparingly and wash off after an hour before eventually leaving it on overnight.  The patient verbalized understanding of the proper use and possible adverse effects of tazorac.  All of the patient's questions and concerns were addressed.
Benzoyl Peroxide Counseling: Patient counseled that medicine may cause skin irritation and bleach clothing.  In the event of skin irritation, the patient was advised to reduce the amount of the drug applied or use it less frequently.   The patient verbalized understanding of the proper use and possible adverse effects of benzoyl peroxide.  All of the patient's questions and concerns were addressed.
Odomzo Counseling- I discussed with the patient the risks of Odomzo including but not limited to nausea, vomiting, diarrhea, constipation, weight loss, changes in the sense of taste, decreased appetite, muscle spasms, and hair loss.  The patient verbalized understanding of the proper use and possible adverse effects of Odomzo.  All of the patient's questions and concerns were addressed.
High Dose Vitamin A Counseling: Side effects reviewed, pt to contact office should one occur.
Solaraze Counseling:  I discussed with the patient the risks of Solaraze including but not limited to erythema, scaling, itching, weeping, crusting, and pain.
Xolair Pregnancy And Lactation Text: This medication is Pregnancy Category B and is considered safe during pregnancy. This medication is excreted in breast milk.
Clindamycin Pregnancy And Lactation Text: This medication can be used in pregnancy if certain situations. Clindamycin is also present in breast milk.
Tazorac Pregnancy And Lactation Text: This medication is not safe during pregnancy. It is unknown if this medication is excreted in breast milk.
Erythromycin Pregnancy And Lactation Text: This medication is Pregnancy Category B and is considered safe during pregnancy. It is also excreted in breast milk.
Hydroxyzine Pregnancy And Lactation Text: This medication is not safe during pregnancy and should not be taken. It is also excreted in breast milk and breast feeding isn't recommended.
Imiquimod Counseling:  I discussed with the patient the risks of imiquimod including but not limited to erythema, scaling, itching, weeping, crusting, and pain.  Patient understands that the inflammatory response to imiquimod is variable from person to person and was educated regarded proper titration schedule.  If flu-like symptoms develop, patient knows to discontinue the medication and contact us.
Colchicine Counseling:  Patient counseled regarding adverse effects including but not limited to stomach upset (nausea, vomiting, stomach pain, or diarrhea).  Patient instructed to limit alcohol consumption while taking this medication.  Colchicine may reduce blood counts especially with prolonged use.  The patient understands that monitoring of kidney function and blood counts may be required, especially at baseline. The patient verbalized understanding of the proper use and possible adverse effects of colchicine.  All of the patient's questions and concerns were addressed.
Methotrexate Counseling:  Patient counseled regarding adverse effects of methotrexate including but not limited to nausea, vomiting, abnormalities in liver function tests. Patients may develop mouth sores, rash, diarrhea, and abnormalities in blood counts. The patient understands that monitoring is required including LFT's and blood counts.  There is a rare possibility of scarring of the liver and lung problems that can occur when taking methotrexate. Persistent nausea, loss of appetite, pale stools, dark urine, cough, and shortness of breath should be reported immediately. Patient advised to discontinue methotrexate treatment at least three months before attempting to become pregnant.  I discussed the need for folate supplements while taking methotrexate.  These supplements can decrease side effects during methotrexate treatment. The patient verbalized understanding of the proper use and possible adverse effects of methotrexate.  All of the patient's questions and concerns were addressed.
Picato Counseling:  I discussed with the patient the risks of Picato including but not limited to erythema, scaling, itching, weeping, crusting, and pain.
Topical Clindamycin Pregnancy And Lactation Text: This medication is Pregnancy Category B and is considered safe during pregnancy. It is unknown if it is excreted in breast milk.
Cellcept Counseling:  I discussed with the patient the risks of mycophenolate mofetil including but not limited to infection/immunosuppression, GI upset, hypokalemia, hypercholesterolemia, bone marrow suppression, lymphoproliferative disorders, malignancy, GI ulceration/bleed/perforation, colitis, interstitial lung disease, kidney failure, progressive multifocal leukoencephalopathy, and birth defects.  The patient understands that monitoring is required including a baseline creatinine and regular CBC testing. In addition, patient must alert us immediately if symptoms of infection or other concerning signs are noted.
Rifampin Pregnancy And Lactation Text: This medication is Pregnancy Category C and it isn't know if it is safe during pregnancy. It is also excreted in breast milk and should not be used if you are breast feeding.
Griseofulvin Counseling:  I discussed with the patient the risks of griseofulvin including but not limited to photosensitivity, cytopenia, liver damage, nausea/vomiting and severe allergy.  The patient understands that this medication is best absorbed when taken with a fatty meal (e.g., ice cream or french fries).
Rifampin Counseling: I discussed with the patient the risks of rifampin including but not limited to liver damage, kidney damage, red-orange body fluids, nausea/vomiting and severe allergy.
Hydroxychloroquine Pregnancy And Lactation Text: This medication has been shown to cause fetal harm but it isn't assigned a Pregnancy Risk Category. There are small amounts excreted in breast milk.
Glycopyrrolate Pregnancy And Lactation Text: This medication is Pregnancy Category B and is considered safe during pregnancy. It is unknown if it is excreted breast milk.
Dapsone Pregnancy And Lactation Text: This medication is Pregnancy Category C and is not considered safe during pregnancy or breast feeding.
Azithromycin Counseling:  I discussed with the patient the risks of azithromycin including but not limited to GI upset, allergic reaction, drug rash, diarrhea, and yeast infections.
Terbinafine Counseling: Patient counseling regarding adverse effects of terbinafine including but not limited to headache, diarrhea, rash, upset stomach, liver function test abnormalities, itching, taste/smell disturbance, nausea, abdominal pain, and flatulence.  There is a rare possibility of liver failure that can occur when taking terbinafine.  The patient understands that a baseline LFT and kidney function test may be required. The patient verbalized understanding of the proper use and possible adverse effects of terbinafine.  All of the patient's questions and concerns were addressed.
Clindamycin Counseling: I counseled the patient regarding use of clindamycin as an antibiotic for prophylactic and/or therapeutic purposes. Clindamycin is active against numerous classes of bacteria, including skin bacteria. Side effects may include nausea, diarrhea, gastrointestinal upset, rash, hives, yeast infections, and in rare cases, colitis.
Rituxan Counseling:  I discussed with the patient the risks of Rituxan infusions. Side effects can include infusion reactions, severe drug rashes including mucocutaneous reactions, reactivation of latent hepatitis and other infections and rarely progressive multifocal leukoencephalopathy.  All of the patient's questions and concerns were addressed.
Cyclophosphamide Pregnancy And Lactation Text: This medication is Pregnancy Category D and it isn't considered safe during pregnancy. This medication is excreted in breast milk.
Isotretinoin Counseling: Patient should get monthly blood tests, not donate blood, not drive at night if vision affected, not share medication, and not undergo elective surgery for 6 months after tx completed. Side effects reviewed, pt to contact office should one occur.
Siliq Counseling:  I discussed with the patient the risks of Siliq including but not limited to new or worsening depression, suicidal thoughts and behavior, immunosuppression, malignancy, posterior leukoencephalopathy syndrome, and serious infections.  The patient understands that monitoring is required including a PPD at baseline and must alert us or the primary physician if symptoms of infection or other concerning signs are noted. There is also a special program designed to monitor depression which is required with Siliq.
Cosentyx Counseling:  I discussed with the patient the risks of Cosentyx including but not limited to worsening of Crohn's disease, immunosuppression, allergic reactions and infections.  The patient understands that monitoring is required including a PPD at baseline and must alert us or the primary physician if symptoms of infection or other concerning signs are noted.
Enbrel Counseling:  I discussed with the patient the risks of etanercept including but not limited to myelosuppression, immunosuppression, autoimmune hepatitis, demyelinating diseases, lymphoma, and infections.  The patient understands that monitoring is required including a PPD at baseline and must alert us or the primary physician if symptoms of infection or other concerning signs are noted.
Minocycline Counseling: Patient advised regarding possible photosensitivity and discoloration of the teeth, skin, lips, tongue and gums.  Patient instructed to avoid sunlight, if possible.  When exposed to sunlight, patients should wear protective clothing, sunglasses, and sunscreen.  The patient was instructed to call the office immediately if the following severe adverse effects occur:  hearing changes, easy bruising/bleeding, severe headache, or vision changes.  The patient verbalized understanding of the proper use and possible adverse effects of minocycline.  All of the patient's questions and concerns were addressed.
Carac Counseling:  I discussed with the patient the risks of Carac including but not limited to erythema, scaling, itching, weeping, crusting, and pain.
Thalidomide Pregnancy And Lactation Text: This medication is Pregnancy Category X and is absolutely contraindicated during pregnancy. It is unknown if it is excreted in breast milk.
Infliximab Counseling:  I discussed with the patient the risks of infliximab including but not limited to myelosuppression, immunosuppression, autoimmune hepatitis, demyelinating diseases, lymphoma, and serious infections.  The patient understands that monitoring is required including a PPD at baseline and must alert us or the primary physician if symptoms of infection or other concerning signs are noted.
Tetracycline Counseling: Patient counseled regarding possible photosensitivity and increased risk for sunburn.  Patient instructed to avoid sunlight, if possible.  When exposed to sunlight, patients should wear protective clothing, sunglasses, and sunscreen.  The patient was instructed to call the office immediately if the following severe adverse effects occur:  hearing changes, easy bruising/bleeding, severe headache, or vision changes.  The patient verbalized understanding of the proper use and possible adverse effects of tetracycline.  All of the patient's questions and concerns were addressed. Patient understands to avoid pregnancy while on therapy due to potential birth defects.
Cyclosporine Counseling:  I discussed with the patient the risks of cyclosporine including but not limited to hypertension, gingival hyperplasia,myelosuppression, immunosuppression, liver damage, kidney damage, neurotoxicity, lymphoma, and serious infections. The patient understands that monitoring is required including baseline blood pressure, CBC, CMP, lipid panel and uric acid, and then 1-2 times monthly CMP and blood pressure.
Nsaids Counseling: NSAID Counseling: I discussed with the patient that NSAIDs should be taken with food. Prolonged use of NSAIDs can result in the development of stomach ulcers.  Patient advised to stop taking NSAIDs if abdominal pain occurs.  The patient verbalized understanding of the proper use and possible adverse effects of NSAIDs.  All of the patient's questions and concerns were addressed.
Zyclara Counseling:  I discussed with the patient the risks of imiquimod including but not limited to erythema, scaling, itching, weeping, crusting, and pain.  Patient understands that the inflammatory response to imiquimod is variable from person to person and was educated regarded proper titration schedule.  If flu-like symptoms develop, patient knows to discontinue the medication and contact us.
Bactrim Pregnancy And Lactation Text: This medication is Pregnancy Category D and is known to cause fetal risk.  It is also excreted in breast milk.
Cyclophosphamide Counseling:  I discussed with the patient the risks of cyclophosphamide including but not limited to hair loss, hormonal abnormalities, decreased fertility, abdominal pain, diarrhea, nausea and vomiting, bone marrow suppression and infection. The patient understands that monitoring is required while taking this medication.
Valtrex Counseling: I discussed with the patient the risks of valacyclovir including but not limited to kidney damage, nausea, vomiting and severe allergy.  The patient understands that if the infection seems to be worsening or is not improving, they are to call.
Cimetidine Counseling:  I discussed with the patient the risks of Cimetidine including but not limited to gynecomastia, headache, diarrhea, nausea, drowsiness, arrhythmias, pancreatitis, skin rashes, psychosis, bone marrow suppression and kidney toxicity.
Quinolones Counseling:  I discussed with the patient the risks of fluoroquinolones including but not limited to GI upset, allergic reaction, drug rash, diarrhea, dizziness, photosensitivity, yeast infections, liver function test abnormalities, tendonitis/tendon rupture.
Bactrim Counseling:  I discussed with the patient the risks of sulfa antibiotics including but not limited to GI upset, allergic reaction, drug rash, diarrhea, dizziness, photosensitivity, and yeast infections.  Rarely, more serious reactions can occur including but not limited to aplastic anemia, agranulocytosis, methemoglobinemia, blood dyscrasias, liver or kidney failure, lung infiltrates or desquamative/blistering drug rashes.
Nsaids Pregnancy And Lactation Text: These medications are considered safe up to 30 weeks gestation. It is excreted in breast milk.
Bexarotene Pregnancy And Lactation Text: This medication is Pregnancy Category X and should not be given to women who are pregnant or may become pregnant. This medication should not be used if you are breast feeding.
Hydroquinone Counseling:  Patient advised that medication may result in skin irritation, lightening (hypopigmentation), dryness, and burning.  In the event of skin irritation, the patient was advised to reduce the amount of the drug applied or use it less frequently.  Rarely, spots that are treated with hydroquinone can become darker (pseudoochronosis).  Should this occur, patient instructed to stop medication and call the office. The patient verbalized understanding of the proper use and possible adverse effects of hydroquinone.  All of the patient's questions and concerns were addressed.
Ketoconazole Counseling:   Patient counseled regarding improving absorption with orange juice.  Adverse effects include but are not limited to breast enlargement, headache, diarrhea, nausea, upset stomach, liver function test abnormalities, taste disturbance, and stomach pain.  There is a rare possibility of liver failure that can occur when taking ketoconazole. The patient understands that monitoring of LFTs may be required, especially at baseline. The patient verbalized understanding of the proper use and possible adverse effects of ketoconazole.  All of the patient's questions and concerns were addressed.
Doxepin Pregnancy And Lactation Text: This medication is Pregnancy Category C and it isn't known if it is safe during pregnancy. It is also excreted in breast milk and breast feeding isn't recommended.
Oxybutynin Counseling:  I discussed with the patient the risks of oxybutynin including but not limited to skin rash, drowsiness, dry mouth, difficulty urinating, and blurred vision.
Ilumya Counseling: I discussed with the patient the risks of tildrakizumab including but not limited to immunosuppression, malignancy, posterior leukoencephalopathy syndrome, and serious infections.  The patient understands that monitoring is required including a PPD at baseline and must alert us or the primary physician if symptoms of infection or other concerning signs are noted.
Use Enhanced Medication Counseling?: No
Birth Control Pills Pregnancy And Lactation Text: This medication should be avoided if pregnant and for the first 30 days post-partum.
Skyrizi Counseling: I discussed with the patient the risks of risankizumab-rzaa including but not limited to immunosuppression, and serious infections.  The patient understands that monitoring is required including a PPD at baseline and must alert us or the primary physician if symptoms of infection or other concerning signs are noted.
Xeljanz Counseling: I discussed with the patient the risks of Xeljanz therapy including increased risk of infection, liver issues, headache, diarrhea, or cold symptoms. Live vaccines should be avoided. They were instructed to call if they have any problems.
Doxycycline Pregnancy And Lactation Text: This medication is Pregnancy Category D and not consider safe during pregnancy. It is also excreted in breast milk but is considered safe for shorter treatment courses.
Xelmicaelaz Pregnancy And Lactation Text: This medication is Pregnancy Category D and is not considered safe during pregnancy.  The risk during breast feeding is also uncertain.
Cephalexin Pregnancy And Lactation Text: This medication is Pregnancy Category B and considered safe during pregnancy.  It is also excreted in breast milk but can be used safely for shorter doses.
Benzoyl Peroxide Pregnancy And Lactation Text: This medication is Pregnancy Category C. It is unknown if benzoyl peroxide is excreted in breast milk.
SSKI Counseling:  I discussed with the patient the risks of SSKI including but not limited to thyroid abnormalities, metallic taste, GI upset, fever, headache, acne, arthralgias, paraesthesias, lymphadenopathy, easy bleeding, arrhythmias, and allergic reaction.
Drysol Pregnancy And Lactation Text: This medication is considered safe during pregnancy and breast feeding.
Ivermectin Counseling:  Patient instructed to take medication on an empty stomach with a full glass of water.  Patient informed of potential adverse effects including but not limited to nausea, diarrhea, dizziness, itching, and swelling of the extremities or lymph nodes.  The patient verbalized understanding of the proper use and possible adverse effects of ivermectin.  All of the patient's questions and concerns were addressed.
Protopic Counseling: Patient may experience a mild burning sensation during topical application. Protopic is not approved in children less than 2 years of age. There have been case reports of hematologic and skin malignancies in patients using topical calcineurin inhibitors although causality is questionable.
5-Fu Pregnancy And Lactation Text: This medication is Pregnancy Category X and contraindicated in pregnancy and in women who may become pregnant. It is unknown if this medication is excreted in breast milk.
Stelara Counseling:  I discussed with the patient the risks of ustekinumab including but not limited to immunosuppression, malignancy, posterior leukoencephalopathy syndrome, and serious infections.  The patient understands that monitoring is required including a PPD at baseline and must alert us or the primary physician if symptoms of infection or other concerning signs are noted.
Tremfya Counseling: I discussed with the patient the risks of guselkumab including but not limited to immunosuppression, serious infections, and drug reactions.  The patient understands that monitoring is required including a PPD at baseline and must alert us or the primary physician if symptoms of infection or other concerning signs are noted.
Acitretin Counseling:  I discussed with the patient the risks of acitretin including but not limited to hair loss, dry lips/skin/eyes, liver damage, hyperlipidemia, depression/suicidal ideation, photosensitivity.  Serious rare side effects can include but are not limited to pancreatitis, pseudotumor cerebri, bony changes, clot formation/stroke/heart attack.  Patient understands that alcohol is contraindicated since it can result in liver toxicity and significantly prolong the elimination of the drug by many years.
Bexarotene Counseling:  I discussed with the patient the risks of bexarotene including but not limited to hair loss, dry lips/skin/eyes, liver abnormalities, hyperlipidemia, pancreatitis, depression/suicidal ideation, photosensitivity, drug rash/allergic reactions, hypothyroidism, anemia, leukopenia, infection, cataracts, and teratogenicity.  Patient understands that they will need regular blood tests to check lipid profile, liver function tests, white blood cell count, thyroid function tests and pregnancy test if applicable.
Protopic Pregnancy And Lactation Text: This medication is Pregnancy Category C. It is unknown if this medication is excreted in breast milk when applied topically.
Erivedge Counseling- I discussed with the patient the risks of Erivedge including but not limited to nausea, vomiting, diarrhea, constipation, weight loss, changes in the sense of taste, decreased appetite, muscle spasms, and hair loss.  The patient verbalized understanding of the proper use and possible adverse effects of Erivedge.  All of the patient's questions and concerns were addressed.
Eucrisa Counseling: Patient may experience a mild burning sensation during topical application. Eucrisa is not approved in children less than 2 years of age.
Taltz Counseling: I discussed with the patient the risks of ixekizumab including but not limited to immunosuppression, serious infections, worsening of inflammatory bowel disease and drug reactions.  The patient understands that monitoring is required including a PPD at baseline and must alert us or the primary physician if symptoms of infection or other concerning signs are noted.
Isotretinoin Pregnancy And Lactation Text: This medication is Pregnancy Category X and is considered extremely dangerous during pregnancy. It is unknown if it is excreted in breast milk.
Metronidazole Counseling:  I discussed with the patient the risks of metronidazole including but not limited to seizures, nausea/vomiting, a metallic taste in the mouth, nausea/vomiting and severe allergy.
Itraconazole Counseling:  I discussed with the patient the risks of itraconazole including but not limited to liver damage, nausea/vomiting, neuropathy, and severe allergy.  The patient understands that this medication is best absorbed when taken with acidic beverages such as non-diet cola or ginger ale.  The patient understands that monitoring is required including baseline LFTs and repeat LFTs at intervals.  The patient understands that they are to contact us or the primary physician if concerning signs are noted.
Valtrex Pregnancy And Lactation Text: this medication is Pregnancy Category B and is considered safe during pregnancy. This medication is not directly found in breast milk but it's metabolite acyclovir is present.
Dapsone Counseling: I discussed with the patient the risks of dapsone including but not limited to hemolytic anemia, agranulocytosis, rashes, methemoglobinemia, kidney failure, peripheral neuropathy, headaches, GI upset, and liver toxicity.  Patients who start dapsone require monitoring including baseline LFTs and weekly CBCs for the first month, then every month thereafter.  The patient verbalized understanding of the proper use and possible adverse effects of dapsone.  All of the patient's questions and concerns were addressed.
Simponi Counseling:  I discussed with the patient the risks of golimumab including but not limited to myelosuppression, immunosuppression, autoimmune hepatitis, demyelinating diseases, lymphoma, and serious infections.  The patient understands that monitoring is required including a PPD at baseline and must alert us or the primary physician if symptoms of infection or other concerning signs are noted.
Dupixent Counseling: I discussed with the patient the risks of dupilumab including but not limited to eye infection and irritation, cold sores, injection site reactions, worsening of asthma, allergic reactions and increased risk of parasitic infection.  Live vaccines should be avoided while taking dupilumab. Dupilumab will also interact with certain medications such as warfarin and cyclosporine. The patient understands that monitoring is required and they must alert us or the primary physician if symptoms of infection or other concerning signs are noted.
Gabapentin Counseling: I discussed with the patient the risks of gabapentin including but not limited to dizziness, somnolence, fatigue and ataxia.
Spironolactone Pregnancy And Lactation Text: This medication can cause feminization of the male fetus and should be avoided during pregnancy. The active metabolite is also found in breast milk.
Erythromycin Counseling:  I discussed with the patient the risks of erythromycin including but not limited to GI upset, allergic reaction, drug rash, diarrhea, increase in liver enzymes, and yeast infections.
Metronidazole Pregnancy And Lactation Text: This medication is Pregnancy Category B and considered safe during pregnancy.  It is also excreted in breast milk.
Detail Level: Detailed
Cephalexin Counseling: I counseled the patient regarding use of cephalexin as an antibiotic for prophylactic and/or therapeutic purposes. Cephalexin (commonly prescribed under brand name Keflex) is a cephalosporin antibiotic which is active against numerous classes of bacteria, including most skin bacteria. Side effects may include nausea, diarrhea, gastrointestinal upset, rash, hives, yeast infections, and in rare cases, hepatitis, kidney disease, seizures, fever, confusion, neurologic symptoms, and others. Patients with severe allergies to penicillin medications are cautioned that there is about a 10% incidence of cross-reactivity with cephalosporins. When possible, patients with penicillin allergies should use alternatives to cephalosporins for antibiotic therapy.
Terbinafine Pregnancy And Lactation Text: This medication is Pregnancy Category B and is considered safe during pregnancy. It is also excreted in breast milk and breast feeding isn't recommended.
5-Fu Counseling: 5-Fluorouracil Counseling:  I discussed with the patient the risks of 5-fluorouracil including but not limited to erythema, scaling, itching, weeping, crusting, and pain.
High Dose Vitamin A Pregnancy And Lactation Text: High dose vitamin A therapy is contraindicated during pregnancy and breast feeding.
Sarecycline Counseling: Patient advised regarding possible photosensitivity and discoloration of the teeth, skin, lips, tongue and gums.  Patient instructed to avoid sunlight, if possible.  When exposed to sunlight, patients should wear protective clothing, sunglasses, and sunscreen.  The patient was instructed to call the office immediately if the following severe adverse effects occur:  hearing changes, easy bruising/bleeding, severe headache, or vision changes.  The patient verbalized understanding of the proper use and possible adverse effects of sarecycline.  All of the patient's questions and concerns were addressed.
Otezla Pregnancy And Lactation Text: This medication is Pregnancy Category C and it isn't known if it is safe during pregnancy. It is unknown if it is excreted in breast milk.
Topical Clindamycin Counseling: Patient counseled that this medication may cause skin irritation or allergic reactions.  In the event of skin irritation, the patient was advised to reduce the amount of the drug applied or use it less frequently.   The patient verbalized understanding of the proper use and possible adverse effects of clindamycin.  All of the patient's questions and concerns were addressed.
Topical Sulfur Applications Pregnancy And Lactation Text: This medication is Pregnancy Category C and has an unknown safety profile during pregnancy. It is unknown if this topical medication is excreted in breast milk.
Azathioprine Counseling:  I discussed with the patient the risks of azathioprine including but not limited to myelosuppression, immunosuppression, hepatotoxicity, lymphoma, and infections.  The patient understands that monitoring is required including baseline LFTs, Creatinine, possible TPMP genotyping and weekly CBCs for the first month and then every 2 weeks thereafter.  The patient verbalized understanding of the proper use and possible adverse effects of azathioprine.  All of the patient's questions and concerns were addressed.
Glycopyrrolate Counseling:  I discussed with the patient the risks of glycopyrrolate including but not limited to skin rash, drowsiness, dry mouth, difficulty urinating, and blurred vision.
Cimzia Pregnancy And Lactation Text: This medication crosses the placenta but can be considered safe in certain situations. Cimzia may be excreted in breast milk.
Topical Retinoid counseling:  Patient advised to apply a pea-sized amount only at bedtime and wait 30 minutes after washing their face before applying.  If too drying, patient may add a non-comedogenic moisturizer. The patient verbalized understanding of the proper use and possible adverse effects of retinoids.  All of the patient's questions and concerns were addressed.
Thalidomide Counseling: I discussed with the patient the risks of thalidomide including but not limited to birth defects, anxiety, weakness, chest pain, dizziness, cough and severe allergy.
Humira Counseling:  I discussed with the patient the risks of adalimumab including but not limited to myelosuppression, immunosuppression, autoimmune hepatitis, demyelinating diseases, lymphoma, and serious infections.  The patient understands that monitoring is required including a PPD at baseline and must alert us or the primary physician if symptoms of infection or other concerning signs are noted.
Cimzia Counseling:  I discussed with the patient the risks of Cimzia including but not limited to immunosuppression, allergic reactions and infections.  The patient understands that monitoring is required including a PPD at baseline and must alert us or the primary physician if symptoms of infection or other concerning signs are noted.
Arava Counseling:  Patient counseled regarding adverse effects of Arava including but not limited to nausea, vomiting, abnormalities in liver function tests. Patients may develop mouth sores, rash, diarrhea, and abnormalities in blood counts. The patient understands that monitoring is required including LFTs and blood counts.  There is a rare possibility of scarring of the liver and lung problems that can occur when taking methotrexate. Persistent nausea, loss of appetite, pale stools, dark urine, cough, and shortness of breath should be reported immediately. Patient advised to discontinue Arava treatment and consult with a physician prior to attempting conception. The patient will have to undergo a treatment to eliminate Arava from the body prior to conception.
Methotrexate Pregnancy And Lactation Text: This medication is Pregnancy Category X and is known to cause fetal harm. This medication is excreted in breast milk.

## 2021-12-13 NOTE — PROCEDURE: COUNSELING
Erythromycin Counseling:  I discussed with the patient the risks of erythromycin including but not limited to GI upset, allergic reaction, drug rash, diarrhea, increase in liver enzymes, and yeast infections.
Topical Retinoid counseling:  Patient advised to apply a pea-sized amount only at bedtime and wait 30 minutes after washing their face before applying.  If too drying, patient may add a non-comedogenic moisturizer. The patient verbalized understanding of the proper use and possible adverse effects of retinoids.  All of the patient's questions and concerns were addressed.
Spironolactone Pregnancy And Lactation Text: This medication can cause feminization of the male fetus and should be avoided during pregnancy. The active metabolite is also found in breast milk.
Bactrim Counseling:  I discussed with the patient the risks of sulfa antibiotics including but not limited to GI upset, allergic reaction, drug rash, diarrhea, dizziness, photosensitivity, and yeast infections.  Rarely, more serious reactions can occur including but not limited to aplastic anemia, agranulocytosis, methemoglobinemia, blood dyscrasias, liver or kidney failure, lung infiltrates or desquamative/blistering drug rashes.
Bactrim Pregnancy And Lactation Text: This medication is Pregnancy Category D and is known to cause fetal risk.  It is also excreted in breast milk.
Birth Control Pills Pregnancy And Lactation Text: This medication should be avoided if pregnant and for the first 30 days post-partum.
Detail Level: Detailed
Topical Retinoid Pregnancy And Lactation Text: This medication is Pregnancy Category C. It is unknown if this medication is excreted in breast milk.
Isotretinoin Pregnancy And Lactation Text: This medication is Pregnancy Category X and is considered extremely dangerous during pregnancy. It is unknown if it is excreted in breast milk.
Azithromycin Pregnancy And Lactation Text: This medication is considered safe during pregnancy and is also secreted in breast milk.
Tazorac Pregnancy And Lactation Text: This medication is not safe during pregnancy. It is unknown if this medication is excreted in breast milk.
High Dose Vitamin A Pregnancy And Lactation Text: High dose vitamin A therapy is contraindicated during pregnancy and breast feeding.
Minocycline Pregnancy And Lactation Text: This medication is Pregnancy Category D and not consider safe during pregnancy. It is also excreted in breast milk.
Isotretinoin Counseling: Patient should get monthly blood tests, not donate blood, not drive at night if vision affected, not share medication, and not undergo elective surgery for 6 months after tx completed. Side effects reviewed, pt to contact office should one occur.
Topical Sulfur Applications Pregnancy And Lactation Text: This medication is Pregnancy Category C and has an unknown safety profile during pregnancy. It is unknown if this topical medication is excreted in breast milk.
Azithromycin Counseling:  I discussed with the patient the risks of azithromycin including but not limited to GI upset, allergic reaction, drug rash, diarrhea, and yeast infections.
Dapsone Pregnancy And Lactation Text: This medication is Pregnancy Category C and is not considered safe during pregnancy or breast feeding.
Topical Clindamycin Counseling: Patient counseled that this medication may cause skin irritation or allergic reactions.  In the event of skin irritation, the patient was advised to reduce the amount of the drug applied or use it less frequently.   The patient verbalized understanding of the proper use and possible adverse effects of clindamycin.  All of the patient's questions and concerns were addressed.
Benzoyl Peroxide Counseling: Patient counseled that medicine may cause skin irritation and bleach clothing.  In the event of skin irritation, the patient was advised to reduce the amount of the drug applied or use it less frequently.   The patient verbalized understanding of the proper use and possible adverse effects of benzoyl peroxide.  All of the patient's questions and concerns were addressed.
Include Pregnancy/Lactation Warning?: No
High Dose Vitamin A Counseling: Side effects reviewed, pt to contact office should one occur.
Topical Sulfur Applications Counseling: Topical Sulfur Counseling: Patient counseled that this medication may cause skin irritation or allergic reactions.  In the event of skin irritation, the patient was advised to reduce the amount of the drug applied or use it less frequently.   The patient verbalized understanding of the proper use and possible adverse effects of topical sulfur application.  All of the patient's questions and concerns were addressed.
Minocycline Counseling: Patient advised regarding possible photosensitivity and discoloration of the teeth, skin, lips, tongue and gums.  Patient instructed to avoid sunlight, if possible.  When exposed to sunlight, patients should wear protective clothing, sunglasses, and sunscreen.  The patient was instructed to call the office immediately if the following severe adverse effects occur:  hearing changes, easy bruising/bleeding, severe headache, or vision changes.  The patient verbalized understanding of the proper use and possible adverse effects of minocycline.  All of the patient's questions and concerns were addressed.
Doxycycline Counseling:  Patient counseled regarding possible photosensitivity and increased risk for sunburn.  Patient instructed to avoid sunlight, if possible.  When exposed to sunlight, patients should wear protective clothing, sunglasses, and sunscreen.  The patient was instructed to call the office immediately if the following severe adverse effects occur:  hearing changes, easy bruising/bleeding, severe headache, or vision changes.  The patient verbalized understanding of the proper use and possible adverse effects of doxycycline.  All of the patient's questions and concerns were addressed.
Dapsone Counseling: I discussed with the patient the risks of dapsone including but not limited to hemolytic anemia, agranulocytosis, rashes, methemoglobinemia, kidney failure, peripheral neuropathy, headaches, GI upset, and liver toxicity.  Patients who start dapsone require monitoring including baseline LFTs and weekly CBCs for the first month, then every month thereafter.  The patient verbalized understanding of the proper use and possible adverse effects of dapsone.  All of the patient's questions and concerns were addressed.
Spironolactone Counseling: Patient advised regarding risks of diarrhea, abdominal pain, hyperkalemia, birth defects (for female patients), liver toxicity and renal toxicity. The patient may need blood work to monitor liver and kidney function and potassium levels while on therapy. The patient verbalized understanding of the proper use and possible adverse effects of spironolactone.  All of the patient's questions and concerns were addressed.
Doxycycline Pregnancy And Lactation Text: This medication is Pregnancy Category D and not consider safe during pregnancy. It is also excreted in breast milk but is considered safe for shorter treatment courses.
Birth Control Pills Counseling: Birth Control Pill Counseling: I discussed with the patient the potential side effects of OCPs including but not limited to increased risk of stroke, heart attack, thrombophlebitis, deep venous thrombosis, hepatic adenomas, breast changes, GI upset, headaches, and depression.  The patient verbalized understanding of the proper use and possible adverse effects of OCPs. All of the patient's questions and concerns were addressed.
Benzoyl Peroxide Pregnancy And Lactation Text: This medication is Pregnancy Category C. It is unknown if benzoyl peroxide is excreted in breast milk.
Sarecycline Counseling: Patient advised regarding possible photosensitivity and discoloration of the teeth, skin, lips, tongue and gums.  Patient instructed to avoid sunlight, if possible.  When exposed to sunlight, patients should wear protective clothing, sunglasses, and sunscreen.  The patient was instructed to call the office immediately if the following severe adverse effects occur:  hearing changes, easy bruising/bleeding, severe headache, or vision changes.  The patient verbalized understanding of the proper use and possible adverse effects of sarecycline.  All of the patient's questions and concerns were addressed.
Topical Clindamycin Pregnancy And Lactation Text: This medication is Pregnancy Category B and is considered safe during pregnancy. It is unknown if it is excreted in breast milk.
Tazorac Counseling:  Patient advised that medication is irritating and drying.  Patient may need to apply sparingly and wash off after an hour before eventually leaving it on overnight.  The patient verbalized understanding of the proper use and possible adverse effects of tazorac.  All of the patient's questions and concerns were addressed.
Tetracycline Counseling: Patient counseled regarding possible photosensitivity and increased risk for sunburn.  Patient instructed to avoid sunlight, if possible.  When exposed to sunlight, patients should wear protective clothing, sunglasses, and sunscreen.  The patient was instructed to call the office immediately if the following severe adverse effects occur:  hearing changes, easy bruising/bleeding, severe headache, or vision changes.  The patient verbalized understanding of the proper use and possible adverse effects of tetracycline.  All of the patient's questions and concerns were addressed. Patient understands to avoid pregnancy while on therapy due to potential birth defects.
Erythromycin Pregnancy And Lactation Text: This medication is Pregnancy Category B and is considered safe during pregnancy. It is also excreted in breast milk.

## 2021-12-14 ENCOUNTER — APPOINTMENT (OUTPATIENT)
Dept: URBAN - METROPOLITAN AREA CLINIC 321 | Age: 26
Setting detail: DERMATOLOGY
End: 2021-12-15

## 2021-12-14 ENCOUNTER — LAB ENCOUNTER (OUTPATIENT)
Dept: LAB | Age: 26
End: 2021-12-14
Attending: STUDENT IN AN ORGANIZED HEALTH CARE EDUCATION/TRAINING PROGRAM
Payer: MEDICAID

## 2021-12-14 DIAGNOSIS — Z79.899 ENCOUNTER FOR LONG-TERM (CURRENT) DRUG USE: Primary | ICD-10-CM

## 2021-12-14 DIAGNOSIS — Z79.899 OTHER LONG TERM (CURRENT) DRUG THERAPY: ICD-10-CM

## 2021-12-14 PROCEDURE — OTHER ORDER TESTS: OTHER

## 2021-12-14 PROCEDURE — 81025 URINE PREGNANCY TEST: CPT

## 2021-12-16 RX ORDER — ISOTRETINOIN 20 MG/1
CAPSULE, LIQUID FILLED ORAL
Qty: 30 | Refills: 0 | Status: ERX

## 2022-01-10 ENCOUNTER — LAB ENCOUNTER (OUTPATIENT)
Dept: LAB | Age: 27
End: 2022-01-10
Attending: FAMILY MEDICINE
Payer: MEDICAID

## 2022-01-10 DIAGNOSIS — Z79.899 ENCOUNTER FOR LONG-TERM (CURRENT) DRUG USE: Primary | ICD-10-CM

## 2022-01-10 LAB
ALBUMIN SERPL-MCNC: 4.2 G/DL (ref 3.4–5)
ALBUMIN/GLOB SERPL: 1.4 {RATIO} (ref 1–2)
ALP LIVER SERPL-CCNC: 59 U/L
ALT SERPL-CCNC: 18 U/L
ANION GAP SERPL CALC-SCNC: 6 MMOL/L (ref 0–18)
AST SERPL-CCNC: 14 U/L (ref 15–37)
B-HCG SERPL-ACNC: <1 MIU/ML
BASOPHILS # BLD AUTO: 0.07 X10(3) UL (ref 0–0.2)
BASOPHILS NFR BLD AUTO: 1.4 %
BILIRUB SERPL-MCNC: 0.5 MG/DL (ref 0.1–2)
BUN BLD-MCNC: 10 MG/DL (ref 7–18)
BUN/CREAT SERPL: 13.7 (ref 10–20)
CALCIUM BLD-MCNC: 9 MG/DL (ref 8.5–10.1)
CHLORIDE SERPL-SCNC: 106 MMOL/L (ref 98–112)
CHOLEST SERPL-MCNC: 152 MG/DL (ref ?–200)
CK SERPL-CCNC: 50 U/L
CO2 SERPL-SCNC: 26 MMOL/L (ref 21–32)
CREAT BLD-MCNC: 0.73 MG/DL
DEPRECATED RDW RBC AUTO: 42.9 FL (ref 35.1–46.3)
EOSINOPHIL # BLD AUTO: 0.06 X10(3) UL (ref 0–0.7)
EOSINOPHIL NFR BLD AUTO: 1.2 %
ERYTHROCYTE [DISTWIDTH] IN BLOOD BY AUTOMATED COUNT: 12.5 % (ref 11–15)
FASTING PATIENT LIPID ANSWER: YES
FASTING STATUS PATIENT QL REPORTED: YES
GGT SERPL-CCNC: 6 U/L
GLOBULIN PLAS-MCNC: 2.9 G/DL (ref 2.8–4.4)
GLUCOSE BLD-MCNC: 107 MG/DL (ref 70–99)
HCT VFR BLD AUTO: 43.7 %
HDLC SERPL-MCNC: 48 MG/DL (ref 40–59)
HGB BLD-MCNC: 14.4 G/DL
IMM GRANULOCYTES # BLD AUTO: 0.01 X10(3) UL (ref 0–1)
IMM GRANULOCYTES NFR BLD: 0.2 %
LDLC SERPL CALC-MCNC: 93 MG/DL (ref ?–100)
LYMPHOCYTES # BLD AUTO: 1.92 X10(3) UL (ref 1–4)
LYMPHOCYTES NFR BLD AUTO: 37.6 %
MCH RBC QN AUTO: 30.6 PG (ref 26–34)
MCHC RBC AUTO-ENTMCNC: 33 G/DL (ref 31–37)
MCV RBC AUTO: 93 FL
MONOCYTES # BLD AUTO: 0.51 X10(3) UL (ref 0.1–1)
MONOCYTES NFR BLD AUTO: 10 %
NEUTROPHILS # BLD AUTO: 2.54 X10 (3) UL (ref 1.5–7.7)
NEUTROPHILS # BLD AUTO: 2.54 X10(3) UL (ref 1.5–7.7)
NEUTROPHILS NFR BLD AUTO: 49.6 %
NONHDLC SERPL-MCNC: 104 MG/DL (ref ?–130)
OSMOLALITY SERPL CALC.SUM OF ELEC: 286 MOSM/KG (ref 275–295)
PLATELET # BLD AUTO: 216 10(3)UL (ref 150–450)
POTASSIUM SERPL-SCNC: 4.2 MMOL/L (ref 3.5–5.1)
PROT SERPL-MCNC: 7.1 G/DL (ref 6.4–8.2)
RBC # BLD AUTO: 4.7 X10(6)UL
SODIUM SERPL-SCNC: 138 MMOL/L (ref 136–145)
TRIGL SERPL-MCNC: 54 MG/DL (ref 30–149)
VLDLC SERPL CALC-MCNC: 9 MG/DL (ref 0–30)
WBC # BLD AUTO: 5.1 X10(3) UL (ref 4–11)

## 2022-01-10 PROCEDURE — 80061 LIPID PANEL: CPT

## 2022-01-10 PROCEDURE — 36415 COLL VENOUS BLD VENIPUNCTURE: CPT

## 2022-01-10 PROCEDURE — 84702 CHORIONIC GONADOTROPIN TEST: CPT

## 2022-01-10 PROCEDURE — 80053 COMPREHEN METABOLIC PANEL: CPT

## 2022-01-10 PROCEDURE — 82550 ASSAY OF CK (CPK): CPT

## 2022-01-10 PROCEDURE — 85025 COMPLETE CBC W/AUTO DIFF WBC: CPT

## 2022-01-10 PROCEDURE — 82977 ASSAY OF GGT: CPT

## 2022-01-13 ENCOUNTER — APPOINTMENT (OUTPATIENT)
Dept: URBAN - METROPOLITAN AREA CLINIC 321 | Age: 27
Setting detail: DERMATOLOGY
End: 2022-01-13

## 2022-01-13 DIAGNOSIS — Z79.899 OTHER LONG TERM (CURRENT) DRUG THERAPY: ICD-10-CM

## 2022-01-13 DIAGNOSIS — L70.0 ACNE VULGARIS: ICD-10-CM

## 2022-01-13 PROCEDURE — OTHER ORDER TESTS: OTHER

## 2022-01-13 PROCEDURE — OTHER ISOTRETINOIN MONITORING: OTHER

## 2022-01-13 PROCEDURE — OTHER LAB REPORTS REVIEWED: OTHER

## 2022-01-13 PROCEDURE — OTHER PRESCRIPTION: OTHER

## 2022-01-13 PROCEDURE — 99214 OFFICE O/P EST MOD 30 MIN: CPT

## 2022-01-13 RX ORDER — ISOTRETINOIN 20 MG/1
CAPSULE, LIQUID FILLED ORAL
Qty: 30 | Refills: 0 | Status: ERX

## 2022-01-13 NOTE — PROCEDURE: ISOTRETINOIN MONITORING
Female Completion Statement: After discussing her treatment course we decided to discontinue isotretinoin therapy at this time. I explained that she would need to continue her birth control methods for at least one month after the last dosage. She should also get a pregnancy test one month after the last dose. She shouldn't donate blood for one month after the last dose. She should call with any new symptoms of depression.
Target Cumulative Dosage (In Mg/Kg): 135
Xerosis Normal Treatment: I recommended application of Cetaphil or CeraVe numerous times a day going to bed to all dry areas.
Headache Monitoring: I recommended monitoring the headaches for now. There is no evidence of increased intracranial pressure. They were instructed to call if the headaches are worsening.
Myalgia Monitoring: I explained this is common when taking isotretinoin. If this worsens they will contact us.
Xerosis Aggressive Treatment: I recommended application of Cetaphil or CeraVe numerous times a day and before going to bed to all dry areas. I also prescribed a topical steroid for twice daily use.
Completed Therapy?: No
Calculate Months Of Therapy Based On Documented Dosages (Will Hide Months Of Therapy Question)?: Yes
Cheilitis Aggressive Treatment: I recommended application of Vaseline or Aquaphor numerous times a day (as often as every hour) and before going to bed. I also prescribed a topical steroid for twice daily use.
Retinoid Dermatitis Aggressive Treatment: I recommended more frequent application of Cetaphil or CeraVe to the areas of dermatitis. I also prescribed a topical steroid for twice daily use until the dermatitis resolves.
Pounds Preamble Statement (Weight Entered In Details Tab): Reported Weight in pounds:
Counseling Text: I reviewed the side effect in detail. Patient should get monthly blood tests, not donate blood, not drive at night if vision affected, and not share medication.
Male Completion Statement: After discussing his treatment course we decided to discontinue isotretinoin therapy at this time. He shouldn't donate blood for one month after the last dose. He should call with any new symptoms of depression.
Weight Units: kilograms
Myalgia Treatment: I explained this is common when taking isotretinoin. If this worsens they will contact us. They may try OTC ibuprofen.
Next Month's Dosage: Continue Current Dosage
Retinoid Dermatitis Normal Treatment: I recommended more frequent application of Cetaphil or CeraVe to the areas of dermatitis.
Months Of Therapy Completed: 1
Cheilitis Normal Treatment: I recommended application of Vaseline or Aquaphor numerous times a day (as often as every hour) and before going to bed.
Hypercholesterolemia Monitoring: I explained this is common when taking isotretinoin. We will monitor closely.
What Is The Patient's Gender: Female
Xerosis Normal Treatment: I recommended application of Cetaphil or CeraVe numerous times a day and before going to bed to all dry areas.
Nosebleeds Normal Treatment: I explained this is common when taking isotretinoin. I recommended saline mist in each nostril multiple times a day. If this worsens they will contact us.
Lower Range (In Mg/Kg): 120
Upper Range (In Mg/Kg): 150
Kilograms Preamble Statement (Weight Entered In Details Tab): Reported Weight in kilograms:
Xerosis Aggressive Treatment: I recommended application of Cetaphil or CeraVe numerous times a day going to bed to all dry areas. I also prescribed a topical steroid for twice daily use.
Use Therapeutic Ranged Or Therapeutic Target: please select Range or Target
Detail Level: Zone
Female Pregnancy Counseling Text: Female patients should also be on two forms of birth control while taking this medication and for one month after their last dose.
Dosing Month 1 (Required For Cumulative Dosing): 20mg Daily

## 2022-01-18 ENCOUNTER — NURSE ONLY (OUTPATIENT)
Dept: FAMILY MEDICINE CLINIC | Facility: CLINIC | Age: 27
End: 2022-01-18
Payer: MEDICAID

## 2022-01-18 DIAGNOSIS — N30.00 ACUTE CYSTITIS WITHOUT HEMATURIA: Primary | ICD-10-CM

## 2022-01-18 DIAGNOSIS — Z23 NEED FOR VACCINATION: Primary | ICD-10-CM

## 2022-01-18 LAB
APPEARANCE: CLEAR
BILIRUBIN: NEGATIVE
GLUCOSE (URINE DIPSTICK): NEGATIVE MG/DL
KETONES (URINE DIPSTICK): NEGATIVE MG/DL
MULTISTIX LOT#: ABNORMAL NUMERIC
NITRITE, URINE: NEGATIVE
PH, URINE: 7 (ref 4.5–8)
PROTEIN (URINE DIPSTICK): NEGATIVE MG/DL
SPECIFIC GRAVITY: 1.01 (ref 1–1.03)
UROBILINOGEN,SEMI-QN: 0.2 MG/DL (ref 0–1.9)

## 2022-01-18 PROCEDURE — 87186 SC STD MICRODIL/AGAR DIL: CPT | Performed by: FAMILY MEDICINE

## 2022-01-18 PROCEDURE — 87077 CULTURE AEROBIC IDENTIFY: CPT | Performed by: FAMILY MEDICINE

## 2022-01-18 PROCEDURE — 90651 9VHPV VACCINE 2/3 DOSE IM: CPT | Performed by: FAMILY MEDICINE

## 2022-01-18 PROCEDURE — 87086 URINE CULTURE/COLONY COUNT: CPT | Performed by: FAMILY MEDICINE

## 2022-01-18 PROCEDURE — 81002 URINALYSIS NONAUTO W/O SCOPE: CPT | Performed by: FAMILY MEDICINE

## 2022-01-18 PROCEDURE — 90471 IMMUNIZATION ADMIN: CPT | Performed by: FAMILY MEDICINE

## 2022-01-18 RX ORDER — NITROFURANTOIN 25; 75 MG/1; MG/1
100 CAPSULE ORAL 2 TIMES DAILY
Qty: 10 CAPSULE | Refills: 0 | Status: SHIPPED | OUTPATIENT
Start: 2022-01-18 | End: 2022-01-18

## 2022-01-18 RX ORDER — NITROFURANTOIN 25; 75 MG/1; MG/1
100 CAPSULE ORAL 2 TIMES DAILY
Qty: 10 CAPSULE | Refills: 0 | Status: SHIPPED | OUTPATIENT
Start: 2022-01-18 | End: 2022-01-23

## 2022-01-18 NOTE — PROGRESS NOTES
Pt here for UTI symptoms including burning. UA dip viewed by Dr. Boris Martin and later Dr. Naun lBake. Awaiting for urine culture results. Pt request HPV vaccine, order approved by DR. Elise and administered.   Pt asked for reasons for pain and burning informed of p

## 2022-01-24 ENCOUNTER — PATIENT MESSAGE (OUTPATIENT)
Dept: FAMILY MEDICINE CLINIC | Facility: CLINIC | Age: 27
End: 2022-01-24

## 2022-01-25 RX ORDER — ISOTRETINOIN 20 MG/1
CAPSULE ORAL
COMMUNITY
Start: 2021-12-16

## 2022-01-26 NOTE — TELEPHONE ENCOUNTER
Background, Timbo 1/26/2022 1:23 PM CST    Good to know. I’ve attached a photo of the vaccine record. Let me know if you need anything else.      Thanks again,  [employer]

## 2022-02-08 ENCOUNTER — LABORATORY ENCOUNTER (OUTPATIENT)
Dept: LAB | Age: 27
End: 2022-02-08
Attending: STUDENT IN AN ORGANIZED HEALTH CARE EDUCATION/TRAINING PROGRAM
Payer: MEDICAID

## 2022-02-08 DIAGNOSIS — Z79.899 ENCOUNTER FOR LONG-TERM (CURRENT) DRUG USE: Primary | ICD-10-CM

## 2022-02-08 LAB
AST SERPL-CCNC: 11 U/L (ref 15–37)
B-HCG SERPL-ACNC: <1 MIU/ML
CHOLEST SERPL-MCNC: 142 MG/DL (ref ?–200)
CK SERPL-CCNC: 56 U/L
FASTING PATIENT LIPID ANSWER: YES
GGT SERPL-CCNC: 9 U/L
HDLC SERPL-MCNC: 47 MG/DL (ref 40–59)
NONHDLC SERPL-MCNC: 95 MG/DL (ref ?–130)
TRIGL SERPL-MCNC: 53 MG/DL (ref 30–149)
VLDLC SERPL CALC-MCNC: 8 MG/DL (ref 0–30)

## 2022-02-08 PROCEDURE — 84450 TRANSFERASE (AST) (SGOT): CPT

## 2022-02-08 PROCEDURE — 82977 ASSAY OF GGT: CPT

## 2022-02-08 PROCEDURE — 80061 LIPID PANEL: CPT

## 2022-02-08 PROCEDURE — 84702 CHORIONIC GONADOTROPIN TEST: CPT

## 2022-02-08 PROCEDURE — 36415 COLL VENOUS BLD VENIPUNCTURE: CPT

## 2022-02-08 PROCEDURE — 82550 ASSAY OF CK (CPK): CPT

## 2022-02-09 ENCOUNTER — PATIENT MESSAGE (OUTPATIENT)
Dept: FAMILY MEDICINE CLINIC | Facility: CLINIC | Age: 27
End: 2022-02-09

## 2022-02-09 ENCOUNTER — HOSPITAL ENCOUNTER (OUTPATIENT)
Dept: GENERAL RADIOLOGY | Age: 27
Discharge: HOME | End: 2022-02-09
Attending: FAMILY MEDICINE
Payer: MEDICAID

## 2022-02-09 ENCOUNTER — OFFICE VISIT (OUTPATIENT)
Dept: FAMILY MEDICINE CLINIC | Facility: CLINIC | Age: 27
End: 2022-02-09
Payer: MEDICAID

## 2022-02-09 VITALS
BODY MASS INDEX: 20.16 KG/M2 | HEIGHT: 65 IN | HEART RATE: 72 BPM | OXYGEN SATURATION: 98 % | DIASTOLIC BLOOD PRESSURE: 68 MMHG | SYSTOLIC BLOOD PRESSURE: 112 MMHG | WEIGHT: 121 LBS

## 2022-02-09 DIAGNOSIS — M53.3 SACRAL BACK PAIN: Primary | ICD-10-CM

## 2022-02-09 DIAGNOSIS — M53.3 SACRAL BACK PAIN: ICD-10-CM

## 2022-02-09 PROCEDURE — 99213 OFFICE O/P EST LOW 20 MIN: CPT | Performed by: FAMILY MEDICINE

## 2022-02-09 PROCEDURE — 3008F BODY MASS INDEX DOCD: CPT | Performed by: FAMILY MEDICINE

## 2022-02-09 PROCEDURE — 3078F DIAST BP <80 MM HG: CPT | Performed by: FAMILY MEDICINE

## 2022-02-09 PROCEDURE — 72170 X-RAY EXAM OF PELVIS: CPT | Performed by: FAMILY MEDICINE

## 2022-02-09 PROCEDURE — 3074F SYST BP LT 130 MM HG: CPT | Performed by: FAMILY MEDICINE

## 2022-02-10 ENCOUNTER — APPOINTMENT (OUTPATIENT)
Dept: URBAN - METROPOLITAN AREA CLINIC 321 | Age: 27
Setting detail: DERMATOLOGY
End: 2022-02-11

## 2022-02-10 ENCOUNTER — MED REC SCAN ONLY (OUTPATIENT)
Dept: FAMILY MEDICINE CLINIC | Facility: CLINIC | Age: 27
End: 2022-02-10

## 2022-02-10 DIAGNOSIS — Z79.899 OTHER LONG TERM (CURRENT) DRUG THERAPY: ICD-10-CM

## 2022-02-10 DIAGNOSIS — L70.0 ACNE VULGARIS: ICD-10-CM

## 2022-02-10 PROCEDURE — OTHER PRESCRIPTION: OTHER

## 2022-02-10 PROCEDURE — OTHER ORDER TESTS: OTHER

## 2022-02-10 PROCEDURE — OTHER LAB REPORTS REVIEWED: OTHER

## 2022-02-10 PROCEDURE — OTHER ISOTRETINOIN MONITORING: OTHER

## 2022-02-10 PROCEDURE — 99214 OFFICE O/P EST MOD 30 MIN: CPT

## 2022-02-10 RX ORDER — ISOTRETINOIN 30 MG/1
CAPSULE, LIQUID FILLED ORAL
Qty: 30 | Refills: 0 | Status: ERX

## 2022-02-10 RX ORDER — ISOTRETINOIN 30 MG/1
CAPSULE, LIQUID FILLED ORAL
Qty: 30 | Refills: 0 | Status: ERX | COMMUNITY
Start: 2022-02-10

## 2022-02-10 NOTE — PROCEDURE: ISOTRETINOIN MONITORING
Xerosis Aggressive Treatment: I recommended application of Cetaphil or CeraVe numerous times a day and before going to bed to all dry areas. I also prescribed a topical steroid for twice daily use.
Hypercholesterolemia Monitoring: I explained this is common when taking isotretinoin. We will monitor closely.
Use Enhanced Counseling Feature (Automatic): No
Show Text Field For Brand Names Of Contraception?: Yes
Myalgia Monitoring: I explained this is common when taking isotretinoin. If this worsens they will contact us.
Cheilitis Normal Treatment: I recommended application of Vaseline or Aquaphor numerous times a day (as often as every hour) and before going to bed.
Xerosis Normal Treatment: I recommended application of Cetaphil or CeraVe numerous times a day and before going to bed to all dry areas.
What Is The Patient's Gender: Female
Nosebleeds Normal Treatment: I explained this is common when taking isotretinoin. I recommended saline mist in each nostril multiple times a day. If this worsens they will contact us.
Myalgia Treatment: I explained this is common when taking isotretinoin. If this worsens they will contact us. They may try OTC ibuprofen.
Headache Monitoring: I recommended monitoring the headaches for now. There is no evidence of increased intracranial pressure. They were instructed to call if the headaches are worsening.
Retinoid Dermatitis Normal Treatment: I recommended more frequent application of Cetaphil or CeraVe to the areas of dermatitis.
Dosing Month 1 (Required For Cumulative Dosing): 20mg Daily
Retinoid Dermatitis Aggressive Treatment: I recommended more frequent application of Cetaphil or CeraVe to the areas of dermatitis. I also prescribed a topical steroid for twice daily use until the dermatitis resolves.
Kilograms Preamble Statement (Weight Entered In Details Tab): Reported Weight in kilograms:
Xerosis Aggressive Treatment: I recommended application of Cetaphil or CeraVe numerous times a day going to bed to all dry areas. I also prescribed a topical steroid for twice daily use.
Use Therapeutic Ranged Or Therapeutic Target: please select Range or Target
Detail Level: Zone
Most Recent Beta Hcg (Optional): negative (not pregnant)
Female Pregnancy Counseling Text: Female patients should also be on two forms of birth control while taking this medication and for one month after their last dose.
Xerosis Normal Treatment: I recommended application of Cetaphil or CeraVe numerous times a day going to bed to all dry areas.
Upper Range (In Mg/Kg): 150
Cheilitis Aggressive Treatment: I recommended application of Vaseline or Aquaphor numerous times a day (as often as every hour) and before going to bed. I also prescribed a topical steroid for twice daily use.
Female Completion Statement: After discussing her treatment course we decided to discontinue isotretinoin therapy at this time. I explained that she would need to continue her birth control methods for at least one month after the last dosage. She should also get a pregnancy test one month after the last dose. She shouldn't donate blood for one month after the last dose. She should call with any new symptoms of depression.
Target Cumulative Dosage (In Mg/Kg): 135
Months Of Therapy Completed: 1
Patient Weight (Optional But Required For Cumulative Dose-Numbers And Decimals Only): 116
Counseling Text: I reviewed the side effect in detail. Patient should get monthly blood tests, not donate blood, not drive at night if vision affected, and not share medication.
Lower Range (In Mg/Kg): 120
Pounds Preamble Statement (Weight Entered In Details Tab): Reported Weight in pounds:
Weight Units: pounds
Next Month's Dosage: 30mg Daily
Male Completion Statement: After discussing his treatment course we decided to discontinue isotretinoin therapy at this time. He shouldn't donate blood for one month after the last dose. He should call with any new symptoms of depression.

## 2022-02-11 NOTE — TELEPHONE ENCOUNTER
Called patient after speaking with her gynecologist and let her know that as long as she can feel the IUD strings there is no concern for it being out of alignment. If she can not, she should come in for a visit with Dr. Beatrice Zarate. Otherwise, she should schedule with PT and follow-up with me if back pain not improving.

## 2022-02-21 ENCOUNTER — PATIENT MESSAGE (OUTPATIENT)
Dept: FAMILY MEDICINE CLINIC | Facility: CLINIC | Age: 27
End: 2022-02-21

## 2022-02-21 NOTE — TELEPHONE ENCOUNTER
From: Dianna Aguilar  To: Ermelinda Billings DO  Sent: 2/9/2022 7:40 PM CST  Subject: Back X Mey Henson Dr. Raul Cid,    I saw your message about my X-ray. So my back pain could be tied to my IUD? Does that mean something is wrong with the IUD (like it's misplaced)? I injured my back September 2020 and had the IUD inserted December 2020, so they were close by each other but the back pain still started before. However, I can't remember if I had much back pain in November/December before the IUD was inserted, so I'm not sure if the pain had gone away or was still happening. If it is the IUD, will physical therapy cause more problems? Thanks!   Jaswant Gilbert

## 2022-03-03 NOTE — TELEPHONE ENCOUNTER
Spoke with patient and asked for fax number to her PT facility. Patient will be gong to [address] [phone]. Will fax order.

## 2022-03-04 ENCOUNTER — LAB ENCOUNTER (OUTPATIENT)
Dept: LAB | Age: 27
End: 2022-03-04
Attending: FAMILY MEDICINE
Payer: MEDICAID

## 2022-03-04 ENCOUNTER — APPOINTMENT (OUTPATIENT)
Dept: LAB | Age: 27
End: 2022-03-04
Attending: FAMILY MEDICINE
Payer: MEDICAID

## 2022-03-04 DIAGNOSIS — Z79.899 ENCOUNTER FOR DRUG THERAPY: Primary | ICD-10-CM

## 2022-03-04 LAB
ALBUMIN SERPL-MCNC: 4.2 G/DL (ref 3.4–5)
ALP LIVER SERPL-CCNC: 53 U/L
ALT SERPL-CCNC: 13 U/L
ANION GAP SERPL CALC-SCNC: 6 MMOL/L (ref 0–18)
AST SERPL-CCNC: 17 U/L (ref 15–37)
BASOPHILS # BLD AUTO: 0.06 X10(3) UL (ref 0–0.2)
BASOPHILS NFR BLD AUTO: 1.2 %
BILIRUB SERPL-MCNC: 0.5 MG/DL (ref 0.1–2)
BUN BLD-MCNC: 10 MG/DL (ref 7–18)
BUN/CREAT SERPL: 15.6 (ref 10–20)
CALCIUM BLD-MCNC: 8.8 MG/DL (ref 8.5–10.1)
CHLORIDE SERPL-SCNC: 105 MMOL/L (ref 98–112)
CHOLEST SERPL-MCNC: 155 MG/DL (ref ?–200)
CK SERPL-CCNC: 89 U/L
CO2 SERPL-SCNC: 25 MMOL/L (ref 21–32)
CREAT BLD-MCNC: 0.64 MG/DL
DEPRECATED RDW RBC AUTO: 42.9 FL (ref 35.1–46.3)
EOSINOPHIL # BLD AUTO: 0.05 X10(3) UL (ref 0–0.7)
EOSINOPHIL NFR BLD AUTO: 1 %
ERYTHROCYTE [DISTWIDTH] IN BLOOD BY AUTOMATED COUNT: 12.5 % (ref 11–15)
FASTING PATIENT LIPID ANSWER: YES
FASTING STATUS PATIENT QL REPORTED: YES
GGT SERPL-CCNC: 9 U/L
GLOBULIN PLAS-MCNC: 2.7 G/DL (ref 2.8–4.4)
GLUCOSE BLD-MCNC: 81 MG/DL (ref 70–99)
HCG SERPL QL: NEGATIVE
HCT VFR BLD AUTO: 40.8 %
HDLC SERPL-MCNC: 51 MG/DL (ref 40–59)
HGB BLD-MCNC: 13.6 G/DL
IMM GRANULOCYTES # BLD AUTO: 0.01 X10(3) UL (ref 0–1)
LDLC SERPL CALC-MCNC: 95 MG/DL (ref ?–100)
LYMPHOCYTES # BLD AUTO: 1.5 X10(3) UL (ref 1–4)
LYMPHOCYTES NFR BLD AUTO: 30.5 %
MCH RBC QN AUTO: 31 PG (ref 26–34)
MCHC RBC AUTO-ENTMCNC: 33.3 G/DL (ref 31–37)
MCV RBC AUTO: 92.9 FL
MONOCYTES # BLD AUTO: 0.42 X10(3) UL (ref 0.1–1)
MONOCYTES NFR BLD AUTO: 8.6 %
NEUTROPHILS # BLD AUTO: 2.87 X10 (3) UL (ref 1.5–7.7)
NEUTROPHILS # BLD AUTO: 2.87 X10(3) UL (ref 1.5–7.7)
NEUTROPHILS NFR BLD AUTO: 58.5 %
NONHDLC SERPL-MCNC: 104 MG/DL (ref ?–130)
OSMOLALITY SERPL CALC.SUM OF ELEC: 280 MOSM/KG (ref 275–295)
PLATELET # BLD AUTO: 188 10(3)UL (ref 150–450)
POTASSIUM SERPL-SCNC: 3.7 MMOL/L (ref 3.5–5.1)
RBC # BLD AUTO: 4.39 X10(6)UL
SODIUM SERPL-SCNC: 136 MMOL/L (ref 136–145)
TRIGL SERPL-MCNC: 42 MG/DL (ref 30–149)
VLDLC SERPL CALC-MCNC: 7 MG/DL (ref 0–30)
WBC # BLD AUTO: 4.9 X10(3) UL (ref 4–11)

## 2022-03-04 PROCEDURE — 80061 LIPID PANEL: CPT

## 2022-03-04 PROCEDURE — 82977 ASSAY OF GGT: CPT

## 2022-03-04 PROCEDURE — 82550 ASSAY OF CK (CPK): CPT

## 2022-03-04 PROCEDURE — 85025 COMPLETE CBC W/AUTO DIFF WBC: CPT

## 2022-03-04 PROCEDURE — 84703 CHORIONIC GONADOTROPIN ASSAY: CPT

## 2022-03-04 PROCEDURE — 36415 COLL VENOUS BLD VENIPUNCTURE: CPT

## 2022-03-04 PROCEDURE — 80053 COMPREHEN METABOLIC PANEL: CPT

## 2022-03-07 ENCOUNTER — APPOINTMENT (OUTPATIENT)
Dept: URBAN - METROPOLITAN AREA CLINIC 321 | Age: 27
Setting detail: DERMATOLOGY
End: 2022-03-07

## 2022-03-07 DIAGNOSIS — Z79.899 OTHER LONG TERM (CURRENT) DRUG THERAPY: ICD-10-CM

## 2022-03-07 DIAGNOSIS — L70.0 ACNE VULGARIS: ICD-10-CM

## 2022-03-07 DIAGNOSIS — L30.1 DYSHIDROSIS [POMPHOLYX]: ICD-10-CM

## 2022-03-07 PROCEDURE — OTHER COUNSELING: OTHER

## 2022-03-07 PROCEDURE — OTHER LAB REPORTS REVIEWED: OTHER

## 2022-03-07 PROCEDURE — OTHER PRESCRIPTION: OTHER

## 2022-03-07 PROCEDURE — 99214 OFFICE O/P EST MOD 30 MIN: CPT

## 2022-03-07 PROCEDURE — OTHER ISOTRETINOIN MONITORING: OTHER

## 2022-03-07 PROCEDURE — OTHER ORDER TESTS: OTHER

## 2022-03-07 RX ORDER — ISOTRETINOIN 30 MG/1
CAPSULE, LIQUID FILLED ORAL
Qty: 30 | Refills: 0 | Status: ERX

## 2022-03-07 RX ORDER — HYDROCORTISONE 25 MG/G
OINTMENT TOPICAL
Qty: 28.35 | Refills: 0 | Status: ERX | COMMUNITY
Start: 2022-03-07

## 2022-03-07 ASSESSMENT — LOCATION DETAILED DESCRIPTION DERM
LOCATION DETAILED: RIGHT DORSAL INDEX FINGER METACARPOPHALANGEAL JOINT
LOCATION DETAILED: LEFT RADIAL DORSAL HAND

## 2022-03-07 ASSESSMENT — LOCATION SIMPLE DESCRIPTION DERM
LOCATION SIMPLE: LEFT HAND
LOCATION SIMPLE: RIGHT HAND

## 2022-03-07 ASSESSMENT — LOCATION ZONE DERM: LOCATION ZONE: HAND

## 2022-03-07 NOTE — PROCEDURE: ORDER TESTS
Billing Type: Third-Party Bill
Bill For Surgical Tray: no
Expected Date Of Service: 03/07/2022
Clinical Notes (To The Lab): Standing order 6 months

## 2022-03-16 ENCOUNTER — APPOINTMENT (OUTPATIENT)
Dept: PHYSICAL THERAPY | Facility: HOSPITAL | Age: 27
End: 2022-03-16
Attending: FAMILY MEDICINE
Payer: MEDICAID

## 2022-03-18 ENCOUNTER — APPOINTMENT (OUTPATIENT)
Dept: PHYSICAL THERAPY | Facility: HOSPITAL | Age: 27
End: 2022-03-18
Attending: FAMILY MEDICINE
Payer: MEDICAID

## 2022-03-23 ENCOUNTER — APPOINTMENT (OUTPATIENT)
Dept: PHYSICAL THERAPY | Facility: HOSPITAL | Age: 27
End: 2022-03-23
Attending: FAMILY MEDICINE
Payer: MEDICAID

## 2022-03-23 ENCOUNTER — MED REC SCAN ONLY (OUTPATIENT)
Dept: FAMILY MEDICINE CLINIC | Facility: CLINIC | Age: 27
End: 2022-03-23

## 2022-03-25 ENCOUNTER — LAB ENCOUNTER (OUTPATIENT)
Dept: LAB | Age: 27
End: 2022-03-25
Attending: FAMILY MEDICINE
Payer: MEDICAID

## 2022-03-25 ENCOUNTER — APPOINTMENT (OUTPATIENT)
Dept: PHYSICAL THERAPY | Facility: HOSPITAL | Age: 27
End: 2022-03-25
Attending: FAMILY MEDICINE
Payer: MEDICAID

## 2022-03-25 DIAGNOSIS — Z79.899 ENCOUNTER FOR LONG-TERM (CURRENT) DRUG USE: Primary | ICD-10-CM

## 2022-03-25 LAB — B-HCG UR QL: NEGATIVE

## 2022-03-25 PROCEDURE — 81025 URINE PREGNANCY TEST: CPT

## 2022-03-30 ENCOUNTER — APPOINTMENT (OUTPATIENT)
Dept: PHYSICAL THERAPY | Facility: HOSPITAL | Age: 27
End: 2022-03-30
Attending: FAMILY MEDICINE
Payer: MEDICAID

## 2022-04-01 ENCOUNTER — APPOINTMENT (OUTPATIENT)
Dept: PHYSICAL THERAPY | Facility: HOSPITAL | Age: 27
End: 2022-04-01
Attending: FAMILY MEDICINE
Payer: MEDICAID

## 2022-04-06 ENCOUNTER — APPOINTMENT (OUTPATIENT)
Dept: PHYSICAL THERAPY | Facility: HOSPITAL | Age: 27
End: 2022-04-06
Attending: FAMILY MEDICINE
Payer: MEDICAID

## 2022-04-08 ENCOUNTER — APPOINTMENT (OUTPATIENT)
Dept: PHYSICAL THERAPY | Facility: HOSPITAL | Age: 27
End: 2022-04-08
Attending: FAMILY MEDICINE
Payer: MEDICAID

## 2022-05-06 ENCOUNTER — LAB ENCOUNTER (OUTPATIENT)
Dept: LAB | Age: 27
End: 2022-05-06
Attending: DERMATOLOGY
Payer: MEDICAID

## 2022-05-06 DIAGNOSIS — L70.0 SUPERFICIAL ACNE VULGARIS: Primary | ICD-10-CM

## 2022-05-06 DIAGNOSIS — Z79.899 ENCOUNTER FOR LONG-TERM (CURRENT) DRUG USE: ICD-10-CM

## 2022-05-06 LAB — B-HCG UR QL: NEGATIVE

## 2022-05-06 PROCEDURE — 81025 URINE PREGNANCY TEST: CPT

## 2022-05-11 ENCOUNTER — MED REC SCAN ONLY (OUTPATIENT)
Dept: FAMILY MEDICINE CLINIC | Facility: CLINIC | Age: 27
End: 2022-05-11

## 2022-05-26 ENCOUNTER — LAB ENCOUNTER (OUTPATIENT)
Dept: LAB | Age: 27
End: 2022-05-26
Attending: DERMATOLOGY
Payer: MEDICAID

## 2022-05-26 DIAGNOSIS — Z79.899 ENCOUNTER FOR LONG-TERM (CURRENT) DRUG USE: ICD-10-CM

## 2022-05-26 DIAGNOSIS — L70.0 COMMON ACNE: Primary | ICD-10-CM

## 2022-05-26 LAB — B-HCG UR QL: NEGATIVE

## 2022-05-26 PROCEDURE — 81025 URINE PREGNANCY TEST: CPT

## 2022-06-28 ENCOUNTER — OFFICE VISIT (OUTPATIENT)
Dept: FAMILY MEDICINE CLINIC | Facility: CLINIC | Age: 27
End: 2022-06-28
Payer: MEDICAID

## 2022-06-28 VITALS
OXYGEN SATURATION: 98 % | SYSTOLIC BLOOD PRESSURE: 110 MMHG | WEIGHT: 119 LBS | DIASTOLIC BLOOD PRESSURE: 52 MMHG | HEART RATE: 76 BPM | BODY MASS INDEX: 19.83 KG/M2 | HEIGHT: 65 IN

## 2022-06-28 DIAGNOSIS — M53.3 SACRAL BACK PAIN: Primary | ICD-10-CM

## 2022-06-28 PROCEDURE — 99213 OFFICE O/P EST LOW 20 MIN: CPT | Performed by: FAMILY MEDICINE

## 2022-06-28 PROCEDURE — 3008F BODY MASS INDEX DOCD: CPT | Performed by: FAMILY MEDICINE

## 2022-06-28 PROCEDURE — 3078F DIAST BP <80 MM HG: CPT | Performed by: FAMILY MEDICINE

## 2022-06-28 PROCEDURE — 3074F SYST BP LT 130 MM HG: CPT | Performed by: FAMILY MEDICINE

## 2022-06-28 RX ORDER — ISOTRETINOIN 30 MG/1
CAPSULE ORAL
COMMUNITY
Start: 2022-05-02

## 2022-06-28 RX ORDER — COPPER 313.4 MG/1
1 INTRAUTERINE DEVICE INTRAUTERINE ONCE
COMMUNITY

## 2022-06-28 RX ORDER — GLYCERIN/PROPYLENE GLYCOL 0.6 %-0.6%
DROPPERETTE, SINGLE-USE DROP DISPENSER OPHTHALMIC (EYE)
COMMUNITY

## 2022-08-01 NOTE — TELEPHONE ENCOUNTER
S/w pt, relayed test results per MD review. Pt will schedule f/u appt once she has her work schedule.

## 2022-08-01 NOTE — TELEPHONE ENCOUNTER
----- Message from Brett Hernandez MD sent at 7/31/2022  6:14 PM CDT -----  I personally reviewed a plain film x-ray lumbar spine from July 2022 showing L5-S1 disc degeneration perhaps slight facet arthropathy at that level, also hemisacralization of L5 on the left. Please let the patient know that her back x-ray looks okay, there is a little bit of arthritis, she should continue with physical therapy and return in a month.

## 2022-08-09 NOTE — TELEPHONE ENCOUNTER
Liz Orozco, [address] 8/8/2022 1:38 PM CDT      ----- Message -----  From: Dorothy Gaming  Sent: 8/3/2022 1:35 PM CDT  To: [facility]  Subject: Physical Therapy     Hi, I appreciate your response. Can you please forward my message to Dr. Shayla Vernon? I have already been to 10 weeks of physical therapy with no improvement. Dr. Shayla Vernon recommended a physical therapist Harley Blanchard) that he said does a different type of physical therapy than what I received, so I would like him to recommend me a few therapists that do that same therapy. I really don't want to go again to a random therapist like I've already done and waste time.      Thanks,  [employer]

## 2022-10-06 NOTE — PROGRESS NOTES
Diagnosis:    Lumbar spondylosis (I90.014)           Referring Provider: Bulmaro Mcgraw  Date of Evaluation:    9/29/2022    Precautions:  None Next MD visit:   11/1/2022  Date of Surgery: n/a   Insurance Primary/Secondary: BLUE CROSS MEDICAID / N/A     # Auth Visits: 10            Subjective: About the same. Have been doing the stretches twice a day and it feels good while I'm stretching. Pain: 2/10      Objective:   SI Mobility: PA Mobility WNL and pain decreases w/ sacral extension (counter-nutation)  Palpation: Tenderness over L PSIS, L border of sacrum. L lumbar paraspinals w/ increased tone compared to R    Squat test Level 2-3  SAMM Testing R L   Cervical Axial Rotation       Apical Expansion       Adduction Drop Test --  +   Extension Drop Test       SLR       Trunk Rotation       Posterior Mediastinum Expansion Test       Standing Reach Test --   --   Elevated and ER Anterior Rib       PART       PADT --  +    Hrusta Adduction Lift Test       Hrusta Abduction Lift Test              Assessment: Pt w/ reports of decreased pain with sacral mobilization. Able to correctly perform all ex's including child's pose stretch lateral w/ UE's to R in order to stretch L lumbar paraspinals. Repositioning and postural restoration techniques will likely be helpful in improving functional mobility. Sacral mobilization indicated with decreased pain today afterward. MET likely to be helpful as well. Goals:   1. The pt will be independent in their HEP. 2.  The pt will perform household tasks such as laundry and cleaning w/ pain no greater than 1/10  3. 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 Plan: Cont 1-2x/week focusing on sacral mobilization, repositioning ex's and improving functional mobility. Date: 10/6/2022  TX#: 2/10 Date:                 TX#: 3/ Date:                 TX#: 4/ Date:                 TX#: 5/ Date:    Tx#: 6/   TherEx; x 35 min  Reviewed HEP - child's pose stretch. Pt w/ good understanding and has been performing at home. Instructed in:  Supine 90/90 hemibridge and R sidelying L adductor pullback. Supine 90/90 hemibridge - verbal cues to limit distance pelvis is raised up on first exhale. Correct performance achieved - 3 Reps w/ 4-5 inhale/exhales per rep. R sidelying L adductor pullback - initially pt had L groin pain with first attempt, but after repositioning and moving L LE, then starting again, groin pain was eliminated. Pt able to maintain B feet/heels on wall throughout ex. Completed 3 Reps 4-5 breaths/repetition. Manual Therapy: x 15 min  Prone w/ pillow under abdomen - Joint mob to sacrum into counter-nutation - grade 1-2 oscillations, then STMob of L lumbar paraspinals concurrently with counter nutation mob of sacrum  Counter nutation with R rotation mobilizations in prone                      HEP: Child's pose stretch. 10/6/2022 - Added 1) child's pose stretch lateral - UE\"s to R, 2) Supine 90/90 Hemibridge Repositioning 3) R sidelying L adductor pullback     Charges:  TherEx x 2       Total Timed Treatment: 45 min  Total Treatment Time: 45 min

## 2022-10-11 NOTE — PROGRESS NOTES
Diagnosis:    Lumbar spondylosis (B00.061)           Referring Provider: Hanna Rodriguez  Date of Evaluation:    9/29/2022    Precautions:  None Next MD visit:   11/1/2022  Date of Surgery: n/a   Insurance Primary/Secondary: BLUE CROSS MEDICAID / N/A     # Auth Visits: 10            Subjective: Exercises are going well. After doing the lateral child's pose stretch it feels like a good stretch but then the muscles tighten up after. Pain: 0/10      Objective: Palpation: L PSIS higher than R PSIS in prone. Tender at L PSIS. See chart for today's tx.      (10/6/2022 for reference)  Squat test Level 2-3  SAMM Testing R L   Cervical Axial Rotation       Apical Expansion       Adduction Drop Test --  +   Extension Drop Test       SLR       Trunk Rotation       Posterior Mediastinum Expansion Test       Standing Reach Test --   --   Elevated and ER Anterior Rib       PART       PADT --  +    Hrusta Adduction Lift Test       Hrusta Abduction Lift Test              Assessment: Pt with low level of pain today and demonstrates good understanding and performance of ex's including new ex's added today. MET for pelvis and sacrum positioning completed today able to achieve level PSIS R=L. Able to progress to standing L adductor ex's today. Goals:   1. The pt will be independent in their HEP. 2.  The pt will perform household tasks such as laundry and cleaning w/ pain no greater than 1/10  3. 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 Plan: Cont 1-2x/week. Assess adductor drop test and PADT at beginning of next visit. Date: 10/6/2022  TX#: 2/10 Date:10/11/2022             TX#: 3/10 Date:                 TX#: 4/ Date:                 TX#: 5/ Date: Tx#: 6/   TherEx; x 35 min  Reviewed HEP - child's pose stretch. Pt w/ good understanding and has been performing at home. Instructed in:  Supine 90/90 hemibridge and R sidelying L adductor pullback.    Supine 90/90 hemibridge - verbal cues to limit distance pelvis is raised up on first exhale. Correct performance achieved - 3 Reps w/ 4-5 inhale/exhales per rep. R sidelying L adductor pullback - initially pt had L groin pain with first attempt, but after repositioning and moving L LE, then starting again, groin pain was eliminated. Pt able to maintain B feet/heels on wall throughout ex. Completed 3 Reps 4-5 breaths/repetition. Ther Ex x 35 min  Supine 90/90 hemibridge - good performance and correct technique. Only vc's needed to decreased rate of breathing and pause after exhale to allow breaking muscles to reset before next inhale. R sidelying L adductor pullback - very good technique and able to achieve correct motion for both pullback and maintain B feet on wall. Instructed in Standing supported R knee flexion w/ L hip approximation. Vc's for L trunk sidebend w/ L hip hike. Pt able to maintain position and feel R glute, R hamstring and L abdominals throughout ex. Instructed in L sidelying R glute max. Vc's to keep B feet on wall parallel to each other while rotating R knee up (facilitating R glute). Pt able to perform correct technique and able to feel activation of R glute and L abdominals. Manual Therapy: x 15 min  Prone w/ pillow under abdomen - Joint mob to sacrum into counter-nutation - grade 1-2 oscillations, then STMob of L lumbar paraspinals concurrently with counter nutation mob of sacrum  Counter nutation with R rotation mobilizations in prone  Manual Therapy x 10 min  MET addressing L PSIS higher than R PSIS. Prone over pillows - R hip flexion w/ L hip extension x 3 reps  Supine over end of table - R hip flexion w/ L hip extension x 3 reps  R hip extension w L hip flexion x 3 reps  hooklying B hip adduction into towel roll x 2 reps then B hip abduction w/ manual resistance. HEP: Child's pose stretch.   10/6/2022 - Added 1) child's pose stretch lateral - UE\"s to R, 2) Supine 90/90 Hemibridge Repositioning 3) R sidelying L adductor pullback   10/11/2022 - D/C R sidelying L adductor pullback and replace it with 1) Standing supported R knee flexion with L hip approximation. Added 2) L sidelying R glute max and continue child's pose stretch and supine 90/90 hemibridge. Charges:  TherEx x 2, Manual Therapy x 1      Total Timed Treatment: 45 min  Total Treatment Time: 45 min

## 2022-10-13 NOTE — PROGRESS NOTES
Diagnosis:    Lumbar spondylosis (Y77.155)           Referring Provider: Christal Orourke  Date of Evaluation:    9/29/2022    Precautions:  None Next MD visit:   11/1/2022  Date of Surgery: n/a   Insurance Primary/Secondary: BLUE CROSS MEDICAID / N/A     # Auth Visits: 10           Subjective: Was really sore later on Tuesday and a little more pain today too. Objective: Adduction drop test (--) B,   PADT (-- ) B. Palpation: Pain over L lateral border of sacrum and L PSIS. Counter-nutation of sacrum relieves pain. See chart for today's tx. SAMM Testing R L   Cervical Axial Rotation       Apical Expansion       Adduction Drop Test --  +   Extension Drop Test       SLR       Trunk Rotation       Posterior Mediastinum Expansion Test       Standing Reach Test --   --   Elevated and ER Anterior Rib       PART       PADT --  +    Hrusta Adduction Lift Test       Hrusta Abduction Lift Test              Assessment: Pt with more pain after last visit - likely from MET for pelvis. Focused on ex's and mobilization of sacrum this visit. Pt with slight decrease in pain at end of visit. Good understanding of ex's including new one added last visit. Pt has decreased pain after ex's. Goals:   1. The pt will be independent in their HEP. 2.  The pt will perform household tasks such as laundry and cleaning w/ pain no greater than 1/10  3. 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 Plan: Cont 1-2x/week. Date: 10/6/2022  TX#: 2/10 Date:10/11/2022             TX#: 3/10 Date:  10/13/2022           TX#: 4/10 Date:                 TX#: 5/ Date: Tx#: 6/   TherEx; x 35 min  Reviewed HEP - child's pose stretch. Pt w/ good understanding and has been performing at home. Instructed in:  Supine 90/90 hemibridge and R sidelying L adductor pullback. Supine 90/90 hemibridge - verbal cues to limit distance pelvis is raised up on first exhale.  Correct performance achieved - 3 Reps w/ 4-5 inhale/exhales per rep. R sidelying L adductor pullback - initially pt had L groin pain with first attempt, but after repositioning and moving L LE, then starting again, groin pain was eliminated. Pt able to maintain B feet/heels on wall throughout ex. Completed 3 Reps 4-5 breaths/repetition. Ther Ex x 35 min  Supine 90/90 hemibridge - good performance and correct technique. Only vc's needed to decreased rate of breathing and pause after exhale to allow breaking muscles to reset before next inhale. R sidelying L adductor pullback - very good technique and able to achieve correct motion for both pullback and maintain B feet on wall. Instructed in Standing supported R knee flexion w/ L hip approximation. Vc's for L trunk sidebend w/ L hip hike. Pt able to maintain position and feel R glute, R hamstring and L abdominals throughout ex. Instructed in L sidelying R glute max. Vc's to keep B feet on wall parallel to each other while rotating R knee up (facilitating R glute). Pt able to perform correct technique and able to feel activation of R glute and L abdominals. Ther Ex: x 33  Supine 90/90 hemibridge x 3 reps - good demonstration of exercise and good slow breathing throughout. Reviewed L sidelying R glute max x 4 reps vc's to keep R foot on wall and aligned with L foot while rotating R knee toward ceiling. R knee flexion w/ L hip approximation x 4 reps - good demonstration of exercise. Feels R quads, R glutes work during exercise.          `    Manual Therapy: x 15 min  Prone w/ pillow under abdomen - Joint mob to sacrum into counter-nutation - grade 1-2 oscillations, then STMob of L lumbar paraspinals concurrently with counter nutation mob of sacrum  Counter nutation with R rotation mobilizations in prone  Manual Therapy x 10 min  MET addressing L PSIS higher than R PSIS.    Prone over pillows - R hip flexion w/ L hip extension x 3 reps  Supine over end of table - R hip flexion w/ L hip extension x 3 reps  R hip extension w L hip flexion x 3 reps  hooklying B hip adduction into towel roll x 2 reps then B hip abduction w/ manual resistance. Manual Therapy x 12 min  Sacral joint mob - counter nutation grade II oscillation and sustained hold at end range in prone over 2 pillows. STMob of B lumbar paraspinals in lengthened position of child's pose with post pelvic tilt. HEP: Child's pose stretch. 10/6/2022 - Added 1) child's pose stretch lateral - UE\"s to R, 2) Supine 90/90 Hemibridge Repositioning 3) R sidelying L adductor pullback   10/11/2022 - D/C R sidelying L adductor pullback and replace it with 1) Standing supported R knee flexion with L hip approximation. Added 2) L sidelying R glute max and continue child's pose stretch and supine 90/90 hemibridge. Charges:  TherEx x 2, Manual Therapy x 1      Total Timed Treatment: 45 min  Total Treatment Time: 45 min

## 2022-10-18 NOTE — PROGRESS NOTES
Diagnosis:    Lumbar spondylosis (Y95.557)           Referring Provider: Isabel Burns  Date of Evaluation:    9/29/2022    Precautions:  None Next MD visit:   11/1/2022  Date of Surgery: n/a   Insurance Primary/Secondary: BLUE CROSS MEDICAID / N/A     # Auth Visits: 10           Subjective: Doing the ex's at home helps relieve the pain. Objective:   Pain: 0/10 now, over the last few days 2-4/10  Palpation: Pain over L lateral border of sacrum and L PSIS. See chart for today's tx. Assessment: Pt with good response to doing HEP and getting pain relief from that. Continues to have SI symptoms. Added new exercise today to try to address possible rectus femoris/sartorius involvement. Goals:   1. The pt will be independent in their HEP. 2.  The pt will perform household tasks such as laundry and cleaning w/ pain no greater than 1/10  3. 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 Plan: Cont 1-2x/week. Date: 10/6/2022  TX#: 2/10 Date:10/11/2022             TX#: 3/10 Date:  10/13/2022           TX#: 4/10 Date: 10/18/2022            TX#: 5/10 Date: Tx#: 6/   TherEx; x 35 min  Reviewed HEP - child's pose stretch. Pt w/ good understanding and has been performing at home. Instructed in:  Supine 90/90 hemibridge and R sidelying L adductor pullback. Supine 90/90 hemibridge - verbal cues to limit distance pelvis is raised up on first exhale. Correct performance achieved - 3 Reps w/ 4-5 inhale/exhales per rep. R sidelying L adductor pullback - initially pt had L groin pain with first attempt, but after repositioning and moving L LE, then starting again, groin pain was eliminated. Pt able to maintain B feet/heels on wall throughout ex. Completed 3 Reps 4-5 breaths/repetition. Ther Ex x 35 min  Supine 90/90 hemibridge - good performance and correct technique.   Only vc's needed to decreased rate of breathing and pause after exhale to allow breaking muscles to reset before next inhale. R sidelying L adductor pullback - very good technique and able to achieve correct motion for both pullback and maintain B feet on wall. Instructed in Standing supported R knee flexion w/ L hip approximation. Vc's for L trunk sidebend w/ L hip hike. Pt able to maintain position and feel R glute, R hamstring and L abdominals throughout ex. Instructed in L sidelying R glute max. Vc's to keep B feet on wall parallel to each other while rotating R knee up (facilitating R glute). Pt able to perform correct technique and able to feel activation of R glute and L abdominals. Ther Ex: x 33  Supine 90/90 hemibridge x 3 reps - good demonstration of exercise and good slow breathing throughout. Reviewed L sidelying R glute max x 4 reps vc's to keep R foot on wall and aligned with L foot while rotating R knee toward ceiling. R knee flexion w/ L hip approximation x 4 reps - good demonstration of exercise. Feels R quads, R glutes work during exercise. `Ther Ex:  Supine Josué Extension w/ Alternating Respiratory Rectus Femoris and Sartorius -   Good demonstration of new ex. Pt w/ slight symptoms during with L LE in flexion, then symptoms resolved when exercise finished. Supine 90/90 Hemibridge - good performance. No symptoms  L sidelying R glute max -Good performance. No symptoms. Good control of hip motions. Manual Therapy: x 15 min  Prone w/ pillow under abdomen - Joint mob to sacrum into counter-nutation - grade 1-2 oscillations, then STMob of L lumbar paraspinals concurrently with counter nutation mob of sacrum  Counter nutation with R rotation mobilizations in prone  Manual Therapy x 10 min  MET addressing L PSIS higher than R PSIS.    Prone over pillows - R hip flexion w/ L hip extension x 3 reps  Supine over end of table - R hip flexion w/ L hip extension x 3 reps  R hip extension w L hip flexion x 3 reps  hooklying B hip adduction into towel roll x 2 reps then B hip abduction w/ manual resistance. Manual Therapy x 12 min  Sacral joint mob - counter nutation grade II oscillation and sustained hold at end range in prone over 2 pillows. STMob of B lumbar paraspinals in lengthened position of child's pose with post pelvic tilt. Manual Therapy -  X 10 min    STMob B lumbar paraspinals and L/S paraspinals in prone. HEP: Child's pose stretch. 10/6/2022 - Added 1) child's pose stretch lateral - UE\"s to R, 2) Supine 90/90 Hemibridge Repositioning 3) R sidelying L adductor pullback   10/11/2022 - D/C R sidelying L adductor pullback and replace it with 1) Standing supported R knee flexion with L hip approximation. Added 2) L sidelying R glute max and continue child's pose stretch and supine 90/90 hemibridge. Charges:  TherEx x 2, Manual Therapy x 1      Total Timed Treatment: 45 min  Total Treatment Time: 45 min

## 2022-10-20 NOTE — PROGRESS NOTES
Diagnosis:    Lumbar spondylosis (M56.729)           Referring Provider: Shayla Vernon  Date of Evaluation:    9/29/2022    Precautions:  None Next MD visit:   11/1/2022  Date of Surgery: n/a   Insurance Primary/Secondary: BLUE CROSS MEDICAID / N/A     # Auth Visits: 10           Subjective: Feels good today. Typically feels less pain after doing HEP. Objective:   Pain: 0/10 now  Adduction drop test: R = - L = -  PADT: R = - L = -  PART R = - L = -  Hruska Abduction Lift test: R =  3/5 L = 3/5  See chart for today's tx. Assessment: Pt with good pain relief w/ HEP, especially L sidelying R glute max. Added new exercise in standing to HEP. Will cont to focus on R glute max facilitation. Goals:   1. The pt will be independent in their HEP. 2.  The pt will perform household tasks such as laundry and cleaning w/ pain no greater than 1/10  3. 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 Plan: Cont 1-2x/week. Date: 10/6/2022  TX#: 2/10 Date:10/11/2022             TX#: 3/10 Date:  10/13/2022           TX#: 4/10 Date: 10/18/2022            TX#: 5/10 Date: 10/20/2022  Tx#: 6/10    TherEx; x 35 min  Reviewed HEP - child's pose stretch. Pt w/ good understanding and has been performing at home. Instructed in:  Supine 90/90 hemibridge and R sidelying L adductor pullback. Supine 90/90 hemibridge - verbal cues to limit distance pelvis is raised up on first exhale. Correct performance achieved - 3 Reps w/ 4-5 inhale/exhales per rep. R sidelying L adductor pullback - initially pt had L groin pain with first attempt, but after repositioning and moving L LE, then starting again, groin pain was eliminated. Pt able to maintain B feet/heels on wall throughout ex. Completed 3 Reps 4-5 breaths/repetition. Ther Ex x 35 min  Supine 90/90 hemibridge - good performance and correct technique.   Only vc's needed to decreased rate of breathing and pause after exhale to allow breaking muscles to reset before next inhale. R sidelying L adductor pullback - very good technique and able to achieve correct motion for both pullback and maintain B feet on wall. Instructed in Standing supported R knee flexion w/ L hip approximation. Vc's for L trunk sidebend w/ L hip hike. Pt able to maintain position and feel R glute, R hamstring and L abdominals throughout ex. Instructed in L sidelying R glute max. Vc's to keep B feet on wall parallel to each other while rotating R knee up (facilitating R glute). Pt able to perform correct technique and able to feel activation of R glute and L abdominals. Ther Ex: x 33  Supine 90/90 hemibridge x 3 reps - good demonstration of exercise and good slow breathing throughout. Reviewed L sidelying R glute max x 4 reps vc's to keep R foot on wall and aligned with L foot while rotating R knee toward ceiling. R knee flexion w/ L hip approximation x 4 reps - good demonstration of exercise. Feels R quads, R glutes work during exercise. `Ther Ex:  Supine Josué Extension w/ Alternating Respiratory Rectus Femoris and Sartorius -   Good demonstration of new ex. Pt w/ slight symptoms during with L LE in flexion, then symptoms resolved when exercise finished. Supine 90/90 Hemibridge - good performance. No symptoms  L sidelying R glute max -Good performance. No symptoms. Good control of hip motions. Ther Ex:  Standing supported R glute max w/ L hip approximation and L FA IR - Pt able to demonstrate good control throughout. Had slight increased symptoms after. L sidelying R glute max - good performance and good control throughout. Pt with pain relief after. Trial of Standing supported R knee flexion w/ L hip extension. Pt able to control motion and demonstrate good technique throughout. Slight increase in symptoms after.    For HEP: Replace Standing R knee flexion w/ L hip approximation with Standing Supported R Glute Max w/ L hip approximation and L FA IR. Manual Therapy: x 15 min  Prone w/ pillow under abdomen - Joint mob to sacrum into counter-nutation - grade 1-2 oscillations, then STMob of L lumbar paraspinals concurrently with counter nutation mob of sacrum  Counter nutation with R rotation mobilizations in prone  Manual Therapy x 10 min  MET addressing L PSIS higher than R PSIS. Prone over pillows - R hip flexion w/ L hip extension x 3 reps  Supine over end of table - R hip flexion w/ L hip extension x 3 reps  R hip extension w L hip flexion x 3 reps  hooklying B hip adduction into towel roll x 2 reps then B hip abduction w/ manual resistance. Manual Therapy x 12 min  Sacral joint mob - counter nutation grade II oscillation and sustained hold at end range in prone over 2 pillows. STMob of B lumbar paraspinals in lengthened position of child's pose with post pelvic tilt. Manual Therapy -  X 10 min    STMob B lumbar paraspinals and L/S paraspinals in prone. Manual Therapy x 10 min  Prone over 2 pillows - joint mob to sacrum into counter nutation. HEP: Child's pose stretch. 10/6/2022 - Added 1) child's pose stretch lateral - UE\"s to R, 2) Supine 90/90 Hemibridge Repositioning 3) R sidelying L adductor pullback   10/11/2022 - D/C R sidelying L adductor pullback and replace it with 1) Standing supported R knee flexion with L hip approximation. Added 2) L sidelying R glute max and continue child's pose stretch and supine 90/90 hemibridge. Charges:  TherEx x 2, Manual Therapy x 1      Total Timed Treatment: 40 min  Total Treatment Time: 40 min

## 2022-10-25 NOTE — PROGRESS NOTES
Diagnosis:    Lumbar spondylosis (N06.215)           Referring Provider: Danyelle Parks  Date of Evaluation:    9/29/2022    Precautions:  None Next MD visit:   11/1/2022  Date of Surgery: n/a   Insurance Primary/Secondary: BLUE CROSS MEDICAID / N/A     # Auth Visits: 10           Subjective: Cleaning on Saturday and noticed increased pain. Did all the exercises, including the new ones and pain continued to be elevated. Ice pack helped relieve pain and today it's much better. Objective:   Pain: 0/10 now  Adduction drop test: R = +  L = -  After repositioning R = -  PADT: R = +  L = -  After repositioning R = -  PART R = - L = -  [place]rus[place] Abduction Lift test: R =  4/5 L = /45  See chart for today's tx. Assessment: Pt with good results with repositioning with hemibridge in 90/90. Focused on R glute max ex's today and for HEP and will reassess on Thurs. Goals:   1. The pt will be independent in their HEP. 2.  The pt will perform household tasks such as laundry and cleaning w/ pain no greater than 1/10  3. 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 Plan: Cont 1-2x/week. Date: 10/6/2022  TX#: 2/10 Date:10/11/2022             TX#: 3/10 Date:  10/13/2022           TX#: 4/10 Date: 10/18/2022            TX#: 5/10 Date: 10/20/2022  Tx#: 6/10 Date: 10/25/2022  Tx#: 7/10    TherEx; x 35 min  Reviewed HEP - child's pose stretch. Pt w/ good understanding and has been performing at home. Instructed in:  Supine 90/90 hemibridge and R sidelying L adductor pullback. Supine 90/90 hemibridge - verbal cues to limit distance pelvis is raised up on first exhale. Correct performance achieved - 3 Reps w/ 4-5 inhale/exhales per rep. R sidelying L adductor pullback - initially pt had L groin pain with first attempt, but after repositioning and moving L LE, then starting again, groin pain was eliminated. Pt able to maintain B feet/heels on wall throughout ex. Completed 3 Reps 4-5 breaths/repetition. Ther Ex x 35 min  Supine 90/90 hemibridge - good performance and correct technique. Only vc's needed to decreased rate of breathing and pause after exhale to allow breaking muscles to reset before next inhale. R sidelying L adductor pullback - very good technique and able to achieve correct motion for both pullback and maintain B feet on wall. Instructed in Standing supported R knee flexion w/ L hip approximation. Vc's for L trunk sidebend w/ L hip hike. Pt able to maintain position and feel R glute, R hamstring and L abdominals throughout ex. Instructed in L sidelying R glute max. Vc's to keep B feet on wall parallel to each other while rotating R knee up (facilitating R glute). Pt able to perform correct technique and able to feel activation of R glute and L abdominals. Ther Ex: x 33  Supine 90/90 hemibridge x 3 reps - good demonstration of exercise and good slow breathing throughout. Reviewed L sidelying R glute max x 4 reps vc's to keep R foot on wall and aligned with L foot while rotating R knee toward ceiling. R knee flexion w/ L hip approximation x 4 reps - good demonstration of exercise. Feels R quads, R glutes work during exercise. `Ther Ex:  Supine Josué Extension w/ Alternating Respiratory Rectus Femoris and Sartorius -   Good demonstration of new ex. Pt w/ slight symptoms during with L LE in flexion, then symptoms resolved when exercise finished. Supine 90/90 Hemibridge - good performance. No symptoms  L sidelying R glute max -Good performance. No symptoms. Good control of hip motions. Ther Ex:  Standing supported R glute max w/ L hip approximation and L FA IR - Pt able to demonstrate good control throughout. Had slight increased symptoms after. L sidelying R glute max - good performance and good control throughout. Pt with pain relief after. Trial of Standing supported R knee flexion w/ L hip extension.  Pt able to control motion and demonstrate good technique throughout. Slight increase in symptoms after. For HEP: Replace Standing R knee flexion w/ L hip approximation with Standing Supported R Glute Max w/ L hip approximation and L FA IR. Ther Ex:  90/90 Hemibridge repositioning. Good control and performance with ex. Then   L sidelying knee to knee in neutral LE position, w/ R LE shifted forward and w/ R LE shifted back. 4 Reps each position. Pt able to feel L adductor, R adductor and R Glute max and glute med with all ex's and had no inc    Reviewed Standing supported L hip approximation w/ FA IR. Pt with good performance and control throughout ex. No increase in symptoms during or after ex. Manual Therapy: x 15 min  Prone w/ pillow under abdomen - Joint mob to sacrum into counter-nutation - grade 1-2 oscillations, then STMob of L lumbar paraspinals concurrently with counter nutation mob of sacrum  Counter nutation with R rotation mobilizations in prone  Manual Therapy x 10 min  MET addressing L PSIS higher than R PSIS. Prone over pillows - R hip flexion w/ L hip extension x 3 reps  Supine over end of table - R hip flexion w/ L hip extension x 3 reps  R hip extension w L hip flexion x 3 reps  hooklying B hip adduction into towel roll x 2 reps then B hip abduction w/ manual resistance. Manual Therapy x 12 min  Sacral joint mob - counter nutation grade II oscillation and sustained hold at end range in prone over 2 pillows. STMob of B lumbar paraspinals in lengthened position of child's pose with post pelvic tilt. Manual Therapy -  X 10 min    STMob B lumbar paraspinals and L/S paraspinals in prone. Manual Therapy x 10 min  Prone over 2 pillows - joint mob to sacrum into counter nutation. HEP: Child's pose stretch.   10/6/2022 - Added 1) child's pose stretch lateral - UE\"s to R, 2) Supine 90/90 Hemibridge Repositioning 3) R sidelying L adductor pullback   10/11/2022 - D/C R sidelying L adductor pullback and replace it with 1) Standing supported R knee flexion with L hip approximation. Added 2) L sidelying R glute max and continue child's pose stretch and supine 90/90 hemibridge. Charges:  TherEx x 3      Total Timed Treatment: 40 min  Total Treatment Time: 40 min

## 2022-10-27 NOTE — PROGRESS NOTES
Diagnosis:    Lumbar spondylosis (K47.229)           Referring Provider: Adrianne Caldwell  Date of Evaluation:    9/29/2022    Precautions:  None Next MD visit:   11/1/2022  Date of Surgery: n/a   Insurance Primary/Secondary: BLUE CROSS MEDICAID / N/A     # Auth Visits: 10           Subjective: Feeling good. Objective:   Pain: 0/10 now  Adduction drop test: R = -  L = +  After repositioning L = -  PADT: R = -  L = +  After repositioning L = -  PART R = - L = -  Ability to maintain repositioning varies visit to visit. Today, needed to advance to 90/90 L hip shift with R rectus and sartorius to achieve neutral.  Pt able to feel L hamstring and adductor firing during ex. Current HEP:  1) child's pose + child's pose lateral each direction  2) 90/90 supported L Hip shift w/ R Rectus Femoris and Sartorius  3) L sidelying knee to knee  4) Standing supported R glute max w/ L hip approximation and L FA IR  See chart for today's tx. Assessment: Replaced 90/90 hemibridge repositioning with 90/90 supported L Hip Shift with R Rectus Femoris and Sartorius. After this repositioning, able to demonstrate negative Adduction drop test B and negative PADT B. Goals:   1. The pt will be independent in their HEP. 2.  The pt will perform household tasks such as laundry and cleaning w/ pain no greater than 1/10  3. 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 Plan: Cont  1-2 visits. Assess SI involvement next visit. Date: 10/6/2022  TX#: 2/10 Date:10/11/2022             TX#: 3/10 Date:  10/13/2022           TX#: 4/10 Date: 10/18/2022            TX#: 5/10 Date: 10/20/2022  Tx#: 6/10 Date: 10/25/2022  Tx#: 7/10 Date: 10/27/2022  Tx#: 8/10 Date: Tx#:    TherEx; x 35 min  Reviewed HEP - child's pose stretch. Pt w/ good understanding and has been performing at home. Instructed in:  Supine 90/90 hemibridge and R sidelying L adductor pullback.    Supine 90/90 hemibridge - verbal cues to limit distance pelvis is raised up on first exhale. Correct performance achieved - 3 Reps w/ 4-5 inhale/exhales per rep. R sidelying L adductor pullback - initially pt had L groin pain with first attempt, but after repositioning and moving L LE, then starting again, groin pain was eliminated. Pt able to maintain B feet/heels on wall throughout ex. Completed 3 Reps 4-5 breaths/repetition. Ther Ex x 35 min  Supine 90/90 hemibridge - good performance and correct technique. Only vc's needed to decreased rate of breathing and pause after exhale to allow breaking muscles to reset before next inhale. R sidelying L adductor pullback - very good technique and able to achieve correct motion for both pullback and maintain B feet on wall. Instructed in Standing supported R knee flexion w/ L hip approximation. Vc's for L trunk sidebend w/ L hip hike. Pt able to maintain position and feel R glute, R hamstring and L abdominals throughout ex. Instructed in L sidelying R glute max. Vc's to keep B feet on wall parallel to each other while rotating R knee up (facilitating R glute). Pt able to perform correct technique and able to feel activation of R glute and L abdominals. Ther Ex: x 33  Supine 90/90 hemibridge x 3 reps - good demonstration of exercise and good slow breathing throughout. Reviewed L sidelying R glute max x 4 reps vc's to keep R foot on wall and aligned with L foot while rotating R knee toward ceiling. R knee flexion w/ L hip approximation x 4 reps - good demonstration of exercise. Feels R quads, R glutes work during exercise. `Ther Ex:  Supine Josué Extension w/ Alternating Respiratory Rectus Femoris and Sartorius -   Good demonstration of new ex. Pt w/ slight symptoms during with L LE in flexion, then symptoms resolved when exercise finished. Supine 90/90 Hemibridge - good performance. No symptoms  L sidelying R glute max -Good performance. No symptoms.  Good control of hip motions. Ther Ex:  Standing supported R glute max w/ L hip approximation and L FA IR - Pt able to demonstrate good control throughout. Had slight increased symptoms after. L sidelying R glute max - good performance and good control throughout. Pt with pain relief after. Trial of Standing supported R knee flexion w/ L hip extension. Pt able to control motion and demonstrate good technique throughout. Slight increase in symptoms after. For HEP: Replace Standing R knee flexion w/ L hip approximation with Standing Supported R Glute Max w/ L hip approximation and L FA IR. Ther Ex:  90/90 Hemibridge repositioning. Good control and performance with ex. Then   L sidelying knee to knee in neutral LE position, w/ R LE shifted forward and w/ R LE shifted back. 4 Reps each position. Pt able to feel L adductor, R adductor and R Glute max and glute med with all ex's and had no inc    Reviewed Standing supported L hip approximation w/ FA IR. Pt with good performance and control throughout ex. No increase in symptoms during or after ex. Ther Ex:  Performed 90/90 hemibridge repositioning but drop test and PADT remained + on L.   90/90 Supported L Hip shift with R rectus and sartorius exercise - able to complete without pain and afterward, Adduction drop and PADT negative B. Reviewed L sidelying knee to knee. Pt without questions or issues with this at home. Manual Therapy: x 15 min  Prone w/ pillow under abdomen - Joint mob to sacrum into counter-nutation - grade 1-2 oscillations, then STMob of L lumbar paraspinals concurrently with counter nutation mob of sacrum  Counter nutation with R rotation mobilizations in prone  Manual Therapy x 10 min  MET addressing L PSIS higher than R PSIS.    Prone over pillows - R hip flexion w/ L hip extension x 3 reps  Supine over end of table - R hip flexion w/ L hip extension x 3 reps  R hip extension w L hip flexion x 3 reps  hooklying B hip adduction into towel roll x 2 reps then B hip abduction w/ manual resistance. Manual Therapy x 12 min  Sacral joint mob - counter nutation grade II oscillation and sustained hold at end range in prone over 2 pillows. STMob of B lumbar paraspinals in lengthened position of child's pose with post pelvic tilt. Manual Therapy -  X 10 min    STMob B lumbar paraspinals and L/S paraspinals in prone. Manual Therapy x 10 min  Prone over 2 pillows - joint mob to sacrum into counter nutation. Manual Therapy x 10 min  Prone over 2 pillows- STMob to B lumbar and lower thoracic paraspinals. All 4's w post pelvic tilt - joint mob to sacrum into counter nutation - Grade I & II oscillations with STMob to B lumbar paraspinals. HEP: Child's pose stretch. 10/6/2022 - Added 1) child's pose stretch lateral - UE\"s to R, 2) Supine 90/90 Hemibridge Repositioning 3) R sidelying L adductor pullback   10/11/2022 - D/C R sidelying L adductor pullback and replace it with 1) Standing supported R knee flexion with L hip approximation. Added 2) L sidelying R glute max and continue child's pose stretch and supine 90/90 hemibridge. Charges:  TherEx x 2, Manual Therapy x 1      Total Timed Treatment: 40 min  Total Treatment Time: 40 min

## 2022-11-01 NOTE — PROGRESS NOTES
Diagnosis:    Lumbar spondylosis (V46.696)           Referring Provider: Naomy Slaughter  Date of Evaluation:    9/29/2022    Precautions:  None Next MD visit:   11/1/2022  Date of Surgery: n/a   Insurance Primary/Secondary: BLUE CROSS MEDICAID / N/A     # Auth Visits: 10           Subjective:   Reports her pain fluctuates. Bending over and coming back up - feels pain in the L SIJ. Anterior pelvis tilt feels uncomfortable. States when her pain started two years ago, she was unable to bend over. Feels better with her HEP but states her pain returns between PT visits. Objective:   Pain: 0/10 now  Adduction drop test: R = -  L = +  After repositioning L = -  PADT: R = -  L = +  After repositioning L = -  PART R = - L = -  Current HEP:  1) child's pose + child's pose lateral each direction  2) 90/90 supported L Hip shift w/ R Rectus Femoris and Sartorius  3) L sidelying knee to knee  4) Standing supported R glute max w/ L hip approximation and L FA IR  See chart for today's tx. Assessment: . No adverse effects to treatment. Note (+) March test L with (+) L on L sacral rotation. The pt responded well to prone L leg pull, L anterior to posterior ilium mob and pub isometrics but that did not change her pain with the L ADT. Therefore trial of L5S1 central and unilateral PA's and L iliacs release which did decrease her symptoms. The pt was SAMM neutral by the end of the session and is performing her HEP without compensation on a a daily basis but continues to return to therapy in a non-neutral state. Therefore recommended OTC orthotics with medial posting and different running shoes to improve ground awareness. Extensive discussion regarding the patient's current state and possibilities to improve. The pt reported understanding of all instruction. Goals:   1. The pt will be independent in their HEP. 2.  The pt will perform household tasks such as laundry and cleaning w/ pain no greater than 1/10  3.   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 Plan: Cont  1-2 visits. Assess SI involvement next visit. Date: 10/6/2022  TX#: 2/10 Date:10/11/2022             TX#: 3/10 Date:  10/13/2022           TX#: 4/10 Date: 10/18/2022            TX#: 5/10 Date: 10/20/2022  Tx#: 6/10 Date: 10/25/2022  Tx#: 7/10 Date: 10/27/2022  Tx#: 8/10 Date: 11/1/2022  Tx#: 9/10    TherEx; x 35 min  Reviewed HEP - child's pose stretch. Pt w/ good understanding and has been performing at home. Instructed in:  Supine 90/90 hemibridge and R sidelying L adductor pullback. Supine 90/90 hemibridge - verbal cues to limit distance pelvis is raised up on first exhale. Correct performance achieved - 3 Reps w/ 4-5 inhale/exhales per rep. R sidelying L adductor pullback - initially pt had L groin pain with first attempt, but after repositioning and moving L LE, then starting again, groin pain was eliminated. Pt able to maintain B feet/heels on wall throughout ex. Completed 3 Reps 4-5 breaths/repetition. Ther Ex x 35 min  Supine 90/90 hemibridge - good performance and correct technique. Only vc's needed to decreased rate of breathing and pause after exhale to allow breaking muscles to reset before next inhale. R sidelying L adductor pullback - very good technique and able to achieve correct motion for both pullback and maintain B feet on wall. Instructed in Standing supported R knee flexion w/ L hip approximation. Vc's for L trunk sidebend w/ L hip hike. Pt able to maintain position and feel R glute, R hamstring and L abdominals throughout ex. Instructed in L sidelying R glute max. Vc's to keep B feet on wall parallel to each other while rotating R knee up (facilitating R glute). Pt able to perform correct technique and able to feel activation of R glute and L abdominals.    Ther Ex: x 33  Supine 90/90 hemibridge x 3 reps - good demonstration of exercise and good slow breathing throughout. Reviewed L sidelying R glute max x 4 reps vc's to keep R foot on wall and aligned with L foot while rotating R knee toward ceiling. R knee flexion w/ L hip approximation x 4 reps - good demonstration of exercise. Feels R quads, R glutes work during exercise. `Ther Ex:  Supine Josué Extension w/ Alternating Respiratory Rectus Femoris and Sartorius -   Good demonstration of new ex. Pt w/ slight symptoms during with L LE in flexion, then symptoms resolved when exercise finished. Supine 90/90 Hemibridge - good performance. No symptoms  L sidelying R glute max -Good performance. No symptoms. Good control of hip motions. Ther Ex:  Standing supported R glute max w/ L hip approximation and L FA IR - Pt able to demonstrate good control throughout. Had slight increased symptoms after. L sidelying R glute max - good performance and good control throughout. Pt with pain relief after. Trial of Standing supported R knee flexion w/ L hip extension. Pt able to control motion and demonstrate good technique throughout. Slight increase in symptoms after. For HEP: Replace Standing R knee flexion w/ L hip approximation with Standing Supported R Glute Max w/ L hip approximation and L FA IR. Ther Ex:  90/90 Hemibridge repositioning. Good control and performance with ex. Then   L sidelying knee to knee in neutral LE position, w/ R LE shifted forward and w/ R LE shifted back. 4 Reps each position. Pt able to feel L adductor, R adductor and R Glute max and glute med with all ex's and had no inc    Reviewed Standing supported L hip approximation w/ FA IR. Pt with good performance and control throughout ex. No increase in symptoms during or after ex. Ther Ex:  Performed 90/90 hemibridge repositioning but drop test and PADT remained + on L.   90/90 Supported L Hip shift with R rectus and sartorius exercise - able to complete without pain and afterward, Adduction drop and PADT negative B.   Reviewed L sidelying knee to knee. Pt without questions or issues with this at home. There Ex:  Paraspinal release with L hamstring - no change in testing    All 4 R glut max - SAMM neutral     education on shoe wear and need for OTC orthotics    Education on [facility] principles       Manual Therapy: x 15 min  Prone w/ pillow under abdomen - Joint mob to sacrum into counter-nutation - grade 1-2 oscillations, then STMob of L lumbar paraspinals concurrently with counter nutation mob of sacrum  Counter nutation with R rotation mobilizations in prone  Manual Therapy x 10 min  MET addressing L PSIS higher than R PSIS. Prone over pillows - R hip flexion w/ L hip extension x 3 reps  Supine over end of table - R hip flexion w/ L hip extension x 3 reps  R hip extension w L hip flexion x 3 reps  hooklying B hip adduction into towel roll x 2 reps then B hip abduction w/ manual resistance. Manual Therapy x 12 min  Sacral joint mob - counter nutation grade II oscillation and sustained hold at end range in prone over 2 pillows. STMob of B lumbar paraspinals in lengthened position of child's pose with post pelvic tilt. Manual Therapy -  X 10 min    STMob B lumbar paraspinals and L/S paraspinals in prone. Manual Therapy x 10 min  Prone over 2 pillows - joint mob to sacrum into counter nutation. Manual Therapy x 10 min  Prone over 2 pillows- STMob to B lumbar and lower thoracic paraspinals. All 4's w post pelvic tilt - joint mob to sacrum into counter nutation - Grade I & II oscillations with STMob to B lumbar paraspinals. Manual therapy x 25 minutes    Prone leg pull  L anterior to posterior ilium mob  L on L sacral rotation MET  Central and unilateral PA's L4L5 and L5S1    L iliopsoas release                         HEP: Child's pose stretch.   10/6/2022 - Added 1) child's pose stretch lateral - UE\"s to R, 2) Supine 90/90 Hemibridge Repositioning 3) R sidelying L adductor pullback   10/11/2022 - D/C R sidelying L adductor pullback and replace it with 1) Standing supported R knee flexion with L hip approximation. Added 2) L sidelying R glute max and continue child's pose stretch and supine 90/90 hemibridge. Charges:  TherEx x 2, Manual Therapy x 2      Total Timed Treatment: 55 min  Total Treatment Time: 55 min

## 2022-11-08 NOTE — TELEPHONE ENCOUNTER
Patient has been scheduled for Left L45 and L5-S1 facet injections on 11/16/220 at the [place] [address] with Dr. Bryon Angulo.   -Anesthesia type: Local Oral.  -Patient informed to fast 12 hours prior to procedure with IVCS/MAC.   -Scheduling [facility] covid testing required for all procedures whether patient is vaccinated or not. -Patient was advised that if he/she does receive the covid vaccine it needs to be at least 2 weeks before or after the injection. -Medications and allergies reviewed. -Patient reminded to hold NSAIDs (Ibuprofen, ASA 81, Aleve, Naproxen, Mobic, Diclofenac, Etodolac, Celebrex etc.) for 3 days prior to Lumbar MBB/Facet if BMI is greater than 35. For Cervical injections only hold multivitamins, Vitamin E, Fish Oil, Phentermine/Lomaira for 7 days prior to injection and NSAIDS.  mg to be held for 7 days prior to injections.  -If patient is receiving MAC/IVCS Phentermine Pam Day) will need to be held for 7 days prior to injection.  -If on blood thinner clearance has been received to hold this medication by provider.   -Patient informed he/she will need a  to and from procedure. [place] is located in the [facility] [address] 1st floor,  may park in the yellow/purple parking lot.     Patient verbalized understanding and agrees with plan.  -----> Scheduled in [facility]: Yes  Ana Staff: [phone]

## 2022-11-08 NOTE — TELEPHONE ENCOUNTER
Left L45 and L5-S1 facet injections CPT CODE: 14742,87221    Status: Authorized    [employer], request above is authorized. Authorization # [id number] effective date: 11/08/22-01/07/23  Notified nursing for scheduling.

## 2022-11-26 NOTE — PROCEDURES
Preoperative Diagnosis:  No diagnosis found. Postoperative Diagnosis:  No diagnosis found. Procedures:  Left L4-5 and L5-S1 Facet injection(s) under fluoroscopic guidance and contrast enhancement. Surgeon:  Sher Arzate M.D. Anesthesia:  Local      OPERATIVE PROCEDURE:  The patient was consented. She was brought into the operating room and placed on the fluoroscopy table in the prone position. She was sterilely prepped and draped in routine fashion. The facet joints were identified. The overlying skin was anesthetized. 22-gauge spinal needles were introduced and directed towards the Left L4-5 and L5-S1 facet joint(s). Needle position was verified using fluoroscopy and radiographic contrast showing a facet arthrogram.  Radiographic interpretation was that the needle was in proper position for facet injection(s). I placed a mixture of 0.5mL of 6mg/mL Celestone and 0.5mL of 1% preservative-free lidocaine into each joint. The patient tolerated the procedure well without any immediate complications. She was given discharge instructions and is to follow up with me in approximately two weeks.

## 2022-11-29 NOTE — PROGRESS NOTES
Diagnosis:    Lumbar spondylosis (K83.516)           Referring Provider: Concepcion Choi  Date of Evaluation:    9/29/2022    Precautions:  None Next MD visit:   11/1/2022  Date of Surgery: n/a   Insurance Primary/Secondary: BLUE CROSS MEDICAID / N/A     # Auth Visits: 10           Subjective:  Reports her pain has been low since injections and also that she has not done any activities that she knows could bring the pain on. Traveled last week and did not have any increased symptoms during or after the trip. Will probably do some cleaning this weekend and that has caused increased pain in the past.   Objective:   Pain: 0/10 now  Adduction drop test: R = -  L = -  Extension drop test: R = - L = -  SLR = R = - L = -  Hruska adduction lift test = R = 1/5 L = 1/5    Current HEP:  1) child's pose + child's pose lateral each direction  2) 90/90 supported L Hip shift w/ R Rectus Femoris and Sartorius  3) L sidelying knee to knee  4) Standing supported R glute max w/ L hip approximation and L FA IR  5) R sidelying Supported L Glute Med  See chart for today's tx. Assessment: Pt with good effort and able to complete new ex's without issues. Pt arrived to clinic in [facility] which is an improvement compared to previous visits. Will need to assess pain level after returning to typical household and daily activities to see if pain relief continues. Goals:   1. The pt will be independent in their HEP. 2.  The pt will perform household tasks such as laundry and cleaning w/ pain no greater than 1/10  3. 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 Plan: Will request auth for more visits to continue working on pelvic repositioning and LE functional progressions.     Date: 10/6/2022  TX#: 2/10 Date:10/11/2022             TX#: 3/10 Date:  10/13/2022           TX#: 4/10 Date: 10/18/2022            TX#: 5/10 Date: 10/20/2022  Tx#: 6/10 Date: 10/25/2022  Tx#: 7/10 Date: 10/27/2022  Tx#: 8/10 Date: 11/1/2022  Tx#: 9/10  Date: 11/29/2022  Tx# 10/10   TherEx; x 35 min  Reviewed HEP - child's pose stretch. Pt w/ good understanding and has been performing at home. Instructed in:  Supine 90/90 hemibridge and R sidelying L adductor pullback. Supine 90/90 hemibridge - verbal cues to limit distance pelvis is raised up on first exhale. Correct performance achieved - 3 Reps w/ 4-5 inhale/exhales per rep. R sidelying L adductor pullback - initially pt had L groin pain with first attempt, but after repositioning and moving L LE, then starting again, groin pain was eliminated. Pt able to maintain B feet/heels on wall throughout ex. Completed 3 Reps 4-5 breaths/repetition. Ther Ex x 35 min  Supine 90/90 hemibridge - good performance and correct technique. Only vc's needed to decreased rate of breathing and pause after exhale to allow breaking muscles to reset before next inhale. R sidelying L adductor pullback - very good technique and able to achieve correct motion for both pullback and maintain B feet on wall. Instructed in Standing supported R knee flexion w/ L hip approximation. Vc's for L trunk sidebend w/ L hip hike. Pt able to maintain position and feel R glute, R hamstring and L abdominals throughout ex. Instructed in L sidelying R glute max. Vc's to keep B feet on wall parallel to each other while rotating R knee up (facilitating R glute). Pt able to perform correct technique and able to feel activation of R glute and L abdominals. Ther Ex: x 33  Supine 90/90 hemibridge x 3 reps - good demonstration of exercise and good slow breathing throughout. Reviewed L sidelying R glute max x 4 reps vc's to keep R foot on wall and aligned with L foot while rotating R knee toward ceiling. R knee flexion w/ L hip approximation x 4 reps - good demonstration of exercise. Feels R quads, R glutes work during exercise.           `Ther Ex:  Supine Josué Extension w/ Alternating Respiratory Rectus Femoris and Sartorius -   Good demonstration of new ex. Pt w/ slight symptoms during with L LE in flexion, then symptoms resolved when exercise finished. Supine 90/90 Hemibridge - good performance. No symptoms  L sidelying R glute max -Good performance. No symptoms. Good control of hip motions. Ther Ex:  Standing supported R glute max w/ L hip approximation and L FA IR - Pt able to demonstrate good control throughout. Had slight increased symptoms after. L sidelying R glute max - good performance and good control throughout. Pt with pain relief after. Trial of Standing supported R knee flexion w/ L hip extension. Pt able to control motion and demonstrate good technique throughout. Slight increase in symptoms after. For HEP: Replace Standing R knee flexion w/ L hip approximation with Standing Supported R Glute Max w/ L hip approximation and L FA IR. Ther Ex:  90/90 Hemibridge repositioning. Good control and performance with ex. Then   L sidelying knee to knee in neutral LE position, w/ R LE shifted forward and w/ R LE shifted back. 4 Reps each position. Pt able to feel L adductor, R adductor and R Glute max and glute med with all ex's and had no inc    Reviewed Standing supported L hip approximation w/ FA IR. Pt with good performance and control throughout ex. No increase in symptoms during or after ex. Ther Ex:  Performed 90/90 hemibridge repositioning but drop test and PADT remained + on L.   90/90 Supported L Hip shift with R rectus and sartorius exercise - able to complete without pain and afterward, Adduction drop and PADT negative B. Reviewed L sidelying knee to knee. Pt without questions or issues with this at home.    There Ex:  Paraspinal release with L hamstring - no change in testing    All 4 R glut max - SAMM neutral     education on shoe wear and need for OTC orthotics    Education on SAMM principles     Ther Ex:  R sidelying supported L Glute Med: Instructed in and pt performed with minimal verbal cues/corrections needed. Pt able to complete without increased symptoms and had good control throughout exercise. 4 Reps total     L sidelying Knee to Knee: instructed in/reviewed this exercise. Pt able to perform all 3 R hip positions - able to touch L knee to R in neutral R position, not able to fully touch L knee to R with R hip forward and behind L, but pt able to feel L adductor engage throughout. Manual Therapy: x 15 min  Prone w/ pillow under abdomen - Joint mob to sacrum into counter-nutation - grade 1-2 oscillations, then STMob of L lumbar paraspinals concurrently with counter nutation mob of sacrum  Counter nutation with R rotation mobilizations in prone  Manual Therapy x 10 min  MET addressing L PSIS higher than R PSIS. Prone over pillows - R hip flexion w/ L hip extension x 3 reps  Supine over end of table - R hip flexion w/ L hip extension x 3 reps  R hip extension w L hip flexion x 3 reps  hooklying B hip adduction into towel roll x 2 reps then B hip abduction w/ manual resistance. Manual Therapy x 12 min  Sacral joint mob - counter nutation grade II oscillation and sustained hold at end range in prone over 2 pillows. STMob of B lumbar paraspinals in lengthened position of child's pose with post pelvic tilt. Manual Therapy -  X 10 min    STMob B lumbar paraspinals and L/S paraspinals in prone. Manual Therapy x 10 min  Prone over 2 pillows - joint mob to sacrum into counter nutation. Manual Therapy x 10 min  Prone over 2 pillows- STMob to B lumbar and lower thoracic paraspinals. All 4's w post pelvic tilt - joint mob to sacrum into counter nutation - Grade I & II oscillations with STMob to B lumbar paraspinals. Manual therapy x 25 minutes    Prone leg pull  L anterior to posterior ilium mob  L on L sacral rotation MET  Central and unilateral PA's L4L5 and L5S1    L iliopsoas release                            HEP: Child's pose stretch.   10/6/2022 - Added 1) child's pose stretch lateral - UE\"s to R, 2) Supine 90/90 Hemibridge Repositioning 3) R sidelying L adductor pullback   10/11/2022 - D/C R sidelying L adductor pullback and replace it with 1) Standing supported R knee flexion with L hip approximation. Added 2) L sidelying R glute max and continue child's pose stretch and supine 90/90 hemibridge. Charges:  TherEx x 3    Total Timed Treatment: 45 min  Total Treatment Time: 45 min

## 2022-12-09 NOTE — TELEPHONE ENCOUNTER
From: Moris Roberts  To: Jacob Ellison, DO  Sent: 2022 12:15 PM CST  Subject: Ephraim Apgar Dr. Donella Ding,    I didn't mention this on Monday, as it seemed normal for my cold that I caught last Wednesday. However, this cold has been odd because I've had so much nasal stuffiness/blockage and drainage and stuffy sounding ears (When you did my ear check on Monday, for some reason the pressure hurt my ears). It's honestly the most nasal mucus I've ever had with a cold. It's now been over a week and I feel better from my cold, but my sinuses still feel very blocked up and my ears still are muffled. I still have a lot of nasal mucus I'm blowing out too. I think my sinuses got really blocked up. Do you have any recommendations for what to do? Thanks!   Shaun Aguilar

## 2022-12-09 NOTE — TELEPHONE ENCOUNTER
Tommy message sent.    ----- Message from Lupillo Leonardo RN sent at 12/9/2022 10:18 AM CST -----  Regarding: FW: Cold      ----- Message -----  From: Homer Chong  Sent: 12/8/2022  12:15 PM CST  To: [facility]  Subject: Daniel Gentile,    I didn't mention this on Monday, as it seemed normal for my cold that I caught last Wednesday. However, this cold has been odd because I've had so much nasal stuffiness/blockage and drainage and stuffy sounding ears (When you did my ear check on Monday, for some reason the pressure hurt my ears). It's honestly the most nasal mucus I've ever had with a cold. It's now been over a week and I feel better from my cold, but my sinuses still feel very blocked up and my ears still are muffled. I still have a lot of nasal mucus I'm blowing out too. I think my sinuses got really blocked up. Do you have any recommendations for what to do? Thanks!   Imtiaz Lynn

## 2022-12-10 NOTE — TELEPHONE ENCOUNTER
Would try Flonase 2 sprays per nostril daily and please ensure she uses it properly (can watch a video online). Should use for minimum 1-2 weeks and notify me if not improving.

## 2022-12-13 NOTE — PROGRESS NOTES
Diagnosis:    Lumbar spondylosis (A09.446)           Referring Provider: Aubrey Huang  Date of Evaluation:    9/29/2022    Precautions:  None Next MD visit:   11/1/2022  Date of Surgery: n/a   Insurance Primary/Secondary: BLUE CROSS MEDICAID / N/A     # Auth Visits: 10   Plus 6 additional visits approved w/ exp 1/30/2023        Subjective:  Pt reports having ongoing pain in L L/S and sacral area which varies depending on activities. She had two episodes of increased pain while running in the last 1-2 weeks. Anytime she has to lift objects or do activities that involve forward bending, she has increased pain. Objective:   Pain: 0.5/10 now  Squat - L3 w/ Pain L L/S  Forward bend - WNL pain L L/S  Adduction drop test: R = +  L = +  After repositioning with 90/90 hip shift and 90/90 hemibridge, Adduction drop test: R = --  L = --    Current HEP:  1) child's pose + child's pose lateral each direction  2) 90/90 Hip lift w/ Hip shift OR 90/90 Hemibridge for repositioning  3) R sidelying respiratory L adductor pullback  4) Standing supported R knee flexion w/ L hip extension    See chart for today's tx. Assessment: Pt with decreased pain at end of visit. Pt able to demonstrate good control with all ex's and feel correct muscles engage during exercises. This visit, pt was not neutral at start of visit, but was able to achieve neutral position after repositioning exercises. Pt plans to obtain orthotic inserts this week. Will continue to monitor for ability to achieve and maintain neutral position and progress as able with [facility] program.      Goals:   1. The pt will be independent in their HEP. 2.  The pt will perform household tasks such as laundry and cleaning w/ pain no greater than 1/10  3. 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 Plan:  6 more visits approved prior to today's visit.   Will cont PT 1-2 x/ week to progress HEP focusing on maintaining neutral and further progressing from there. Date: 10/6/2022  TX#: 2/10 Date:10/11/2022             TX#: 3/10 Date:  10/13/2022           TX#: 4/10 Date: 10/18/2022            TX#: 5/10 Date: 10/20/2022  Tx#: 6/10 Date: 10/25/2022  Tx#: 7/10 Date: 10/27/2022  Tx#: 8/10 Date: 11/1/2022  Tx#: 9/10  Date: 11/29/2022  Tx# 10/10 Date: 12/13/2022  Tx#: 11/16   TherEx; x 35 min  Reviewed HEP - child's pose stretch. Pt w/ good understanding and has been performing at home. Instructed in:  Supine 90/90 hemibridge and R sidelying L adductor pullback. Supine 90/90 hemibridge - verbal cues to limit distance pelvis is raised up on first exhale. Correct performance achieved - 3 Reps w/ 4-5 inhale/exhales per rep. R sidelying L adductor pullback - initially pt had L groin pain with first attempt, but after repositioning and moving L LE, then starting again, groin pain was eliminated. Pt able to maintain B feet/heels on wall throughout ex. Completed 3 Reps 4-5 breaths/repetition. Ther Ex x 35 min  Supine 90/90 hemibridge - good performance and correct technique. Only vc's needed to decreased rate of breathing and pause after exhale to allow breaking muscles to reset before next inhale. R sidelying L adductor pullback - very good technique and able to achieve correct motion for both pullback and maintain B feet on wall. Instructed in Standing supported R knee flexion w/ L hip approximation. Vc's for L trunk sidebend w/ L hip hike. Pt able to maintain position and feel R glute, R hamstring and L abdominals throughout ex. Instructed in L sidelying R glute max. Vc's to keep B feet on wall parallel to each other while rotating R knee up (facilitating R glute). Pt able to perform correct technique and able to feel activation of R glute and L abdominals. Ther Ex: x 33  Supine 90/90 hemibridge x 3 reps - good demonstration of exercise and good slow breathing throughout.    Reviewed L sidelying R glute max x 4 reps vc's to keep R foot on wall and aligned with L foot while rotating R knee toward ceiling. R knee flexion w/ L hip approximation x 4 reps - good demonstration of exercise. Feels R quads, R glutes work during exercise. `Ther Ex:  Supine Josué Extension w/ Alternating Respiratory Rectus Femoris and Sartorius -   Good demonstration of new ex. Pt w/ slight symptoms during with L LE in flexion, then symptoms resolved when exercise finished. Supine 90/90 Hemibridge - good performance. No symptoms  L sidelying R glute max -Good performance. No symptoms. Good control of hip motions. Ther Ex:  Standing supported R glute max w/ L hip approximation and L FA IR - Pt able to demonstrate good control throughout. Had slight increased symptoms after. L sidelying R glute max - good performance and good control throughout. Pt with pain relief after. Trial of Standing supported R knee flexion w/ L hip extension. Pt able to control motion and demonstrate good technique throughout. Slight increase in symptoms after. For HEP: Replace Standing R knee flexion w/ L hip approximation with Standing Supported R Glute Max w/ L hip approximation and L FA IR. Ther Ex:  90/90 Hemibridge repositioning. Good control and performance with ex. Then   L sidelying knee to knee in neutral LE position, w/ R LE shifted forward and w/ R LE shifted back. 4 Reps each position. Pt able to feel L adductor, R adductor and R Glute max and glute med with all ex's and had no inc    Reviewed Standing supported L hip approximation w/ FA IR. Pt with good performance and control throughout ex. No increase in symptoms during or after ex. Ther Ex:  Performed 90/90 hemibridge repositioning but drop test and PADT remained + on L.   90/90 Supported L Hip shift with R rectus and sartorius exercise - able to complete without pain and afterward, Adduction drop and PADT negative B. Reviewed L sidelying knee to knee.   Pt without questions or issues with this at home. There Ex:  Paraspinal release with L hamstring - no change in testing    All 4 R glut max - SAMM neutral     education on shoe wear and need for OTC orthotics    Education on SAMM principles     Ther Ex:  R sidelying supported L Glute Med: Instructed in and pt performed with minimal verbal cues/corrections needed. Pt able to complete without increased symptoms and had good control throughout exercise. 4 Reps total     L sidelying Knee to Knee: instructed in/reviewed this exercise. Pt able to perform all 3 R hip positions - able to touch L knee to R in neutral R position, not able to fully touch L knee to R with R hip forward and behind L, but pt able to feel L adductor engage throughout. Ther Ex:  90/90 Hip lift with hip shift - pt with good technique and able to feel L hamstring engage throughout. 90/90 supported hip lift w/ hemibridge - pt able to complete with good technique. Verbal cues to minimize distance R foot comes off wall in order to maximize effort of L hamstring. Pt able to make adjustment on first attempt and able to feel L hamstring engage throughout. Standing supported L Hip approximation with L AF IR - pt able to feel L IO/TA engage throughout. R sidelying respiratory L adductor pullback.- Good control   Standing supported R knee flexion w/ L hip extension - pt with good technique throughout and able to feel R glute max facilitation. Manual Therapy: x 15 min  Prone w/ pillow under abdomen - Joint mob to sacrum into counter-nutation - grade 1-2 oscillations, then STMob of L lumbar paraspinals concurrently with counter nutation mob of sacrum  Counter nutation with R rotation mobilizations in prone  Manual Therapy x 10 min  MET addressing L PSIS higher than R PSIS.    Prone over pillows - R hip flexion w/ L hip extension x 3 reps  Supine over end of table - R hip flexion w/ L hip extension x 3 reps  R hip extension w L hip flexion x 3 reps  hooklying B hip adduction into towel roll x 2 reps then B hip abduction w/ manual resistance. Manual Therapy x 12 min  Sacral joint mob - counter nutation grade II oscillation and sustained hold at end range in prone over 2 pillows. STMob of B lumbar paraspinals in lengthened position of child's pose with post pelvic tilt. Manual Therapy -  X 10 min    STMob B lumbar paraspinals and L/S paraspinals in prone. Manual Therapy x 10 min  Prone over 2 pillows - joint mob to sacrum into counter nutation. Manual Therapy x 10 min  Prone over 2 pillows- STMob to B lumbar and lower thoracic paraspinals. All 4's w post pelvic tilt - joint mob to sacrum into counter nutation - Grade I & II oscillations with STMob to B lumbar paraspinals. Manual therapy x 25 minutes    Prone leg pull  L anterior to posterior ilium mob  L on L sacral rotation MET  Central and unilateral PA's L4L5 and L5S1    L iliopsoas release                               HEP: Child's pose stretch. 10/6/2022 - Added 1) child's pose stretch lateral - UE\"s to R, 2) Supine 90/90 Hemibridge Repositioning 3) R sidelying L adductor pullback   10/11/2022 - D/C R sidelying L adductor pullback and replace it with 1) Standing supported R knee flexion with L hip approximation. Added 2) L sidelying R glute max and continue child's pose stretch and supine 90/90 hemibridge. Charges:  TherEx x 4   Total Timed Treatment: 60 min  Total Treatment Time: 60 min

## 2022-12-19 NOTE — TELEPHONE ENCOUNTER
Called pt, denies saturating pad hourly. Per pt flow mod flow but strange it came so early. Pt does have stress at work. Informed to continue to monitor cycles, if persist than follow-up with RD if not than stress related. Informed will route message to her, currently not in the office until 12/21/22.     Last visit 09/01/2022 RD  follow up 1 yr or as needed  Roxanne Correa, DO

## 2022-12-19 NOTE — TELEPHONE ENCOUNTER
Patient is calling stating she has concerns about her Menstrual cycle. Patient is stating she had her [facility] on 12/4 to 12/11, currently started again on 12/16 and still on it with a medium flow. Patient also stated she had been under a lot of stress due to work. Patient would like to talk to RD about it. Please follow up with patient.

## 2023-01-09 NOTE — PROGRESS NOTES
I personally reviewed a lumbar MRI from January 2023 which was normal.  A bit more paraspinal atrophy than I would expect an otherwise healthy 32year-old. Please let the patient know that her back looks structurally excellent on MRI. Right now the only recommendation I have is that she continue doing home exercises. I do not see a source for numbness coming from the lumbar spine. If there are persistent symptoms of numbness, she may benefit from seeing one of the neurologists. Please set her up with one of them if she is open to it, thanks.

## 2023-01-12 NOTE — PROGRESS NOTES
Diagnosis:    Lumbar spondylosis (X84.732)           Referring Provider: Bulmaro Mcgraw  Date of Evaluation:    9/29/2022    Precautions:  None Next MD visit:   11/1/2022  Date of Surgery: n/a   Insurance Primary/Secondary: BLUE CROSS MEDICAID / N/A     # Auth Visits: 10   Plus 6 additional visits approved w/ exp 1/30/2023        Subjective:  Pt reports performing HEP over her break from school, has not been able to do ex's as much now that school/work has started again. Minimal pain overall. Has not done any of the activities that are knows to increase pain (cleaning, tasks that require forward bending). Pt obtained orthotics and has been wearing them regularly. Objective:   Pain: 0.5/10 now  Squat - L3   Adduction drop test: R = -  L = -  Pelvic Putnam Drop Test R = - L = -    See chart for today's tx. Assessment: Pt with good neutral positioning at start of today's visit. Continues to have minimal pain and location is always in L border of sacrum. Will continue to monitor for ability to maintain neutral and assess how new exercises go at home. Goals:   1. The pt will be independent in their HEP. 2.  The pt will perform household tasks such as laundry and cleaning w/ pain no greater than 1/10  3. 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 Plan: Will cont PT 1-2 x/ week to progress HEP focusing on maintaining neutral and further progressing from there. Will need to request exp date extension for visit which is scheduled on 2/2/2023   Date: 10/6/2022  TX#: 2/10 Date:10/11/2022             TX#: 3/10 Date:  10/13/2022           TX#: 4/10 Date: 10/18/2022            TX#: 5/10 Date: 10/20/2022  Tx#: 6/10 Date: 10/25/2022  Tx#: 7/10 Date: 10/27/2022  Tx#: 8/10 Date: 11/1/2022  Tx#: 9/10  Date: 11/29/2022  Tx# 10/10 Date: 12/13/2022  Tx#: 11/16 Date: 01/12/2023  Tx#: 12/16   TherEx; x 35 min  Reviewed HEP - child's pose stretch.  Pt w/ good understanding and has been performing at home. Instructed in:  Supine 90/90 hemibridge and R sidelying L adductor pullback. Supine 90/90 hemibridge - verbal cues to limit distance pelvis is raised up on first exhale. Correct performance achieved - 3 Reps w/ 4-5 inhale/exhales per rep. R sidelying L adductor pullback - initially pt had L groin pain with first attempt, but after repositioning and moving L LE, then starting again, groin pain was eliminated. Pt able to maintain B feet/heels on wall throughout ex. Completed 3 Reps 4-5 breaths/repetition. Ther Ex x 35 min  Supine 90/90 hemibridge - good performance and correct technique. Only vc's needed to decreased rate of breathing and pause after exhale to allow breaking muscles to reset before next inhale. R sidelying L adductor pullback - very good technique and able to achieve correct motion for both pullback and maintain B feet on wall. Instructed in Standing supported R knee flexion w/ L hip approximation. Vc's for L trunk sidebend w/ L hip hike. Pt able to maintain position and feel R glute, R hamstring and L abdominals throughout ex. Instructed in L sidelying R glute max. Vc's to keep B feet on wall parallel to each other while rotating R knee up (facilitating R glute). Pt able to perform correct technique and able to feel activation of R glute and L abdominals. Ther Ex: x 33  Supine 90/90 hemibridge x 3 reps - good demonstration of exercise and good slow breathing throughout. Reviewed L sidelying R glute max x 4 reps vc's to keep R foot on wall and aligned with L foot while rotating R knee toward ceiling. R knee flexion w/ L hip approximation x 4 reps - good demonstration of exercise. Feels R quads, R glutes work during exercise. `Ther Ex:  Supine Josué Extension w/ Alternating Respiratory Rectus Femoris and Sartorius -   Good demonstration of new ex.   Pt w/ slight symptoms during with L LE in flexion, then symptoms resolved when exercise finished. Supine 90/90 Hemibridge - good performance. No symptoms  L sidelying R glute max -Good performance. No symptoms. Good control of hip motions. Ther Ex:  Standing supported R glute max w/ L hip approximation and L FA IR - Pt able to demonstrate good control throughout. Had slight increased symptoms after. L sidelying R glute max - good performance and good control throughout. Pt with pain relief after. Trial of Standing supported R knee flexion w/ L hip extension. Pt able to control motion and demonstrate good technique throughout. Slight increase in symptoms after. For HEP: Replace Standing R knee flexion w/ L hip approximation with Standing Supported R Glute Max w/ L hip approximation and L FA IR. Ther Ex:  90/90 Hemibridge repositioning. Good control and performance with ex. Then   L sidelying knee to knee in neutral LE position, w/ R LE shifted forward and w/ R LE shifted back. 4 Reps each position. Pt able to feel L adductor, R adductor and R Glute max and glute med with all ex's and had no inc    Reviewed Standing supported L hip approximation w/ FA IR. Pt with good performance and control throughout ex. No increase in symptoms during or after ex. Ther Ex:  Performed 90/90 hemibridge repositioning but drop test and PADT remained + on L.   90/90 Supported L Hip shift with R rectus and sartorius exercise - able to complete without pain and afterward, Adduction drop and PADT negative B. Reviewed L sidelying knee to knee. Pt without questions or issues with this at home. There Ex:  Paraspinal release with L hamstring - no change in testing    All 4 R glut max - SAMM neutral     education on shoe wear and need for OTC orthotics    Education on SAMM principles     Ther Ex:  R sidelying supported L Glute Med: Instructed in and pt performed with minimal verbal cues/corrections needed. Pt able to complete without increased symptoms and had good control throughout exercise.   4 Reps total     L sidelying Knee to Knee: instructed in/reviewed this exercise. Pt able to perform all 3 R hip positions - able to touch L knee to R in neutral R position, not able to fully touch L knee to R with R hip forward and behind L, but pt able to feel L adductor engage throughout. Ther Ex:  90/90 Hip lift with hip shift - pt with good technique and able to feel L hamstring engage throughout. 90/90 supported hip lift w/ hemibridge - pt able to complete with good technique. Verbal cues to minimize distance R foot comes off wall in order to maximize effort of L hamstring. Pt able to make adjustment on first attempt and able to feel L hamstring engage throughout. Standing supported L Hip approximation with L AF IR - pt able to feel L IO/TA engage throughout. R sidelying respiratory L adductor pullback.- Good control   Standing supported R knee flexion w/ L hip extension - pt with good technique throughout and able to feel R glute max facilitation. Ther Ex  --L standing posterior capsule stretch added to HEP. Pt able to achieve good position maintaining rounded back and feeling stretch in post capsule. Pt feels relief from compression with stretch. --L sidelying R glute max w/ R hip shifted forward - pt able to complete this easily so added:    --L sidelying supported R Glute Max w/ Hip extension and FA Abduction and L FA Adduction - pt able to complete with slight difficulty. Instructed pt to perform with R LE abd and if too fatigued, do that level for 1-2 days, then add the L LE adduction step after that. Pt verbalized understanding. Manual Therapy: x 15 min  Prone w/ pillow under abdomen - Joint mob to sacrum into counter-nutation - grade 1-2 oscillations, then STMob of L lumbar paraspinals concurrently with counter nutation mob of sacrum  Counter nutation with R rotation mobilizations in prone  Manual Therapy x 10 min  MET addressing L PSIS higher than R PSIS.    Prone over pillows - R hip flexion w/ L hip extension x 3 reps  Supine over end of table - R hip flexion w/ L hip extension x 3 reps  R hip extension w L hip flexion x 3 reps  hooklying B hip adduction into towel roll x 2 reps then B hip abduction w/ manual resistance. Manual Therapy x 12 min  Sacral joint mob - counter nutation grade II oscillation and sustained hold at end range in prone over 2 pillows. STMob of B lumbar paraspinals in lengthened position of child's pose with post pelvic tilt. Manual Therapy -  X 10 min    STMob B lumbar paraspinals and L/S paraspinals in prone. Manual Therapy x 10 min  Prone over 2 pillows - joint mob to sacrum into counter nutation. Manual Therapy x 10 min  Prone over 2 pillows- STMob to B lumbar and lower thoracic paraspinals. All 4's w post pelvic tilt - joint mob to sacrum into counter nutation - Grade I & II oscillations with STMob to B lumbar paraspinals. Manual therapy x 25 minutes    Prone leg pull  L anterior to posterior ilium mob  L on L sacral rotation MET  Central and unilateral PA's L4L5 and L5S1    L iliopsoas release                                  HEP: Child's pose stretch. 10/6/2022 - Added 1) child's pose stretch lateral - UE\"s to R, 2) Supine 90/90 Hemibridge Repositioning 3) R sidelying L adductor pullback   10/11/2022 - D/C R sidelying L adductor pullback and replace it with 1) Standing supported R knee flexion with L hip approximation. Added 2) L sidelying R glute max and continue child's pose stretch and supine 90/90 hemibridge. Charges:  TherEx x 3   Total Timed Treatment: 45 min  Total Treatment Time: 45 min

## 2023-01-19 NOTE — PROGRESS NOTES
Diagnosis:    Lumbar spondylosis (P12.042)           Referring Provider: Christal Orourke  Date of Evaluation:    9/29/2022    Precautions:  None Next MD visit:   11/1/2022  Date of Surgery: n/a   Insurance Primary/Secondary: BLUE CROSS MEDICAID / N/A     # Auth Visits: 10   Plus 6 additional visits approved w/ exp 1/30/2023        Subjective:  Pt reports she has felt really good since last visit. Was able to clean her apartment last weekend without increased pain. Continues to wear the orthotics regularly and that seems to be helping also. Objective:   Pain: 0/10  Adduction drop test: R = -  L = - at start of visit    See chart for today's tx. Assessment: Pt maintainint neutral positioning at start of today's visit and was able to clean her apartment, which in the past predictably flared up her symptoms, without having increased pain. Was able to perform new L post capsule stretch at home which pt feels was helpful. Current exercises and use of orthotics appear to be helping maintain a low level of pain, even with added activities known to cause flare ups in pain level. Will continue with current exercises x 2 weeks and reassess. Goals:   1. The pt will be independent in their HEP. 2.  The pt will perform household tasks such as laundry and cleaning w/ pain no greater than 1/10  3. 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 Plan: Will cont PT 1-2 x/ week to progress HEP focusing on maintaining neutral and further progressing from there.   Will need to request exp date extension for visit which is scheduled on 2/2/2023   Date: 10/6/2022  TX#: 2/10 Date:10/11/2022             TX#: 3/10 Date:  10/13/2022           TX#: 4/10 Date: 10/18/2022            TX#: 5/10 Date: 10/20/2022  Tx#: 6/10 Date: 10/25/2022  Tx#: 7/10 Date: 10/27/2022  Tx#: 8/10 Date: 11/1/2022  Tx#: 9/10  Date: 11/29/2022  Tx# 10/10 Date: 12/13/2022  Tx#: 11/16 Date: 01/12/2023  Tx#: 12/16 Date: 01/19/2023  Tx # 13/16   TherEx; x 35 min  Reviewed HEP - child's pose stretch. Pt w/ good understanding and has been performing at home. Instructed in:  Supine 90/90 hemibridge and R sidelying L adductor pullback. Supine 90/90 hemibridge - verbal cues to limit distance pelvis is raised up on first exhale. Correct performance achieved - 3 Reps w/ 4-5 inhale/exhales per rep. R sidelying L adductor pullback - initially pt had L groin pain with first attempt, but after repositioning and moving L LE, then starting again, groin pain was eliminated. Pt able to maintain B feet/heels on wall throughout ex. Completed 3 Reps 4-5 breaths/repetition. Ther Ex x 35 min  Supine 90/90 hemibridge - good performance and correct technique. Only vc's needed to decreased rate of breathing and pause after exhale to allow breaking muscles to reset before next inhale. R sidelying L adductor pullback - very good technique and able to achieve correct motion for both pullback and maintain B feet on wall. Instructed in Standing supported R knee flexion w/ L hip approximation. Vc's for L trunk sidebend w/ L hip hike. Pt able to maintain position and feel R glute, R hamstring and L abdominals throughout ex. Instructed in L sidelying R glute max. Vc's to keep B feet on wall parallel to each other while rotating R knee up (facilitating R glute). Pt able to perform correct technique and able to feel activation of R glute and L abdominals. Ther Ex: x 33  Supine 90/90 hemibridge x 3 reps - good demonstration of exercise and good slow breathing throughout. Reviewed L sidelying R glute max x 4 reps vc's to keep R foot on wall and aligned with L foot while rotating R knee toward ceiling. R knee flexion w/ L hip approximation x 4 reps - good demonstration of exercise. Feels R quads, R glutes work during exercise.           `Ther Ex:  Supine Josué Extension w/ Alternating Respiratory Rectus Femoris and Sartorius -   Good demonstration of new ex. Pt w/ slight symptoms during with L LE in flexion, then symptoms resolved when exercise finished. Supine 90/90 Hemibridge - good performance. No symptoms  L sidelying R glute max -Good performance. No symptoms. Good control of hip motions. Ther Ex:  Standing supported R glute max w/ L hip approximation and L FA IR - Pt able to demonstrate good control throughout. Had slight increased symptoms after. L sidelying R glute max - good performance and good control throughout. Pt with pain relief after. Trial of Standing supported R knee flexion w/ L hip extension. Pt able to control motion and demonstrate good technique throughout. Slight increase in symptoms after. For HEP: Replace Standing R knee flexion w/ L hip approximation with Standing Supported R Glute Max w/ L hip approximation and L FA IR. Ther Ex:  90/90 Hemibridge repositioning. Good control and performance with ex. Then   L sidelying knee to knee in neutral LE position, w/ R LE shifted forward and w/ R LE shifted back. 4 Reps each position. Pt able to feel L adductor, R adductor and R Glute max and glute med with all ex's and had no inc    Reviewed Standing supported L hip approximation w/ FA IR. Pt with good performance and control throughout ex. No increase in symptoms during or after ex. Ther Ex:  Performed 90/90 hemibridge repositioning but drop test and PADT remained + on L.   90/90 Supported L Hip shift with R rectus and sartorius exercise - able to complete without pain and afterward, Adduction drop and PADT negative B. Reviewed L sidelying knee to knee. Pt without questions or issues with this at home.    There Ex:  Paraspinal release with L hamstring - no change in testing    All 4 R glut max - SAMM neutral     education on shoe wear and need for OTC orthotics    Education on SAMM principles     Ther Ex:  R sidelying supported L Glute Med: Instructed in and pt performed with minimal verbal cues/corrections needed. Pt able to complete without increased symptoms and had good control throughout exercise. 4 Reps total     L sidelying Knee to Knee: instructed in/reviewed this exercise. Pt able to perform all 3 R hip positions - able to touch L knee to R in neutral R position, not able to fully touch L knee to R with R hip forward and behind L, but pt able to feel L adductor engage throughout. Ther Ex:  90/90 Hip lift with hip shift - pt with good technique and able to feel L hamstring engage throughout. 90/90 supported hip lift w/ hemibridge - pt able to complete with good technique. Verbal cues to minimize distance R foot comes off wall in order to maximize effort of L hamstring. Pt able to make adjustment on first attempt and able to feel L hamstring engage throughout. Standing supported L Hip approximation with L AF IR - pt able to feel L IO/TA engage throughout. R sidelying respiratory L adductor pullback.- Good control   Standing supported R knee flexion w/ L hip extension - pt with good technique throughout and able to feel R glute max facilitation. Ther Ex  --L standing posterior capsule stretch added to HEP. Pt able to achieve good position maintaining rounded back and feeling stretch in post capsule. Pt feels relief from compression with stretch. --L sidelying R glute max w/ R hip shifted forward - pt able to complete this easily so added:    --L sidelying supported R Glute Max w/ Hip extension and FA Abduction and L FA Adduction - pt able to complete with slight difficulty. Instructed pt to perform with R LE abd and if too fatigued, do that level for 1-2 days, then add the L LE adduction step after that. Pt verbalized understanding. Ther Ex;  -L sidelying Supported R Glute Max w/ Hip extension and FA Abduction and L FA Adduction. - Reviewed this for set up and performance. Pt able to achieve all steps, with slight difficulty adding L LE adduction.   Instructed her to complete all steps up to that point x 1 week, then try again and see if she is able to complete it with less difficulty. --L post capsule stretch. Pt with great technique and able to feel stretch in L post capsule. Manual Therapy: x 15 min  Prone w/ pillow under abdomen - Joint mob to sacrum into counter-nutation - grade 1-2 oscillations, then STMob of L lumbar paraspinals concurrently with counter nutation mob of sacrum  Counter nutation with R rotation mobilizations in prone  Manual Therapy x 10 min  MET addressing L PSIS higher than R PSIS. Prone over pillows - R hip flexion w/ L hip extension x 3 reps  Supine over end of table - R hip flexion w/ L hip extension x 3 reps  R hip extension w L hip flexion x 3 reps  hooklying B hip adduction into towel roll x 2 reps then B hip abduction w/ manual resistance. Manual Therapy x 12 min  Sacral joint mob - counter nutation grade II oscillation and sustained hold at end range in prone over 2 pillows. STMob of B lumbar paraspinals in lengthened position of child's pose with post pelvic tilt. Manual Therapy -  X 10 min    STMob B lumbar paraspinals and L/S paraspinals in prone. Manual Therapy x 10 min  Prone over 2 pillows - joint mob to sacrum into counter nutation. Manual Therapy x 10 min  Prone over 2 pillows- STMob to B lumbar and lower thoracic paraspinals. All 4's w post pelvic tilt - joint mob to sacrum into counter nutation - Grade I & II oscillations with STMob to B lumbar paraspinals. Manual therapy x 25 minutes    Prone leg pull  L anterior to posterior ilium mob  L on L sacral rotation MET  Central and unilateral PA's L4L5 and L5S1    L iliopsoas release                                     HEP: Child's pose stretch.   10/6/2022 - Added 1) child's pose stretch lateral - UE\"s to R, 2) Supine 90/90 Hemibridge Repositioning 3) R sidelying L adductor pullback   10/11/2022 - D/C R sidelying L adductor pullback and replace it with 1) Standing supported R knee flexion with L hip approximation. Added 2) L sidelying R glute max and continue child's pose stretch and supine 90/90 hemibridge. Charges:  TherEx x 3   Total Timed Treatment: 40 min  Total Treatment Time: 40 min

## 2023-01-27 NOTE — PROGRESS NOTES
Diagnosis:    Lumbar spondylosis (V87.378)           Referring Provider: Concepción Liu  Date of Evaluation:    9/29/2022    Precautions:  None Next MD visit:   11/1/2022  Date of Surgery: n/a   Insurance Primary/Secondary: BLUE CROSS MEDICAID / N/A     # Auth Visits: 10   Plus 6 additional visits approved w/ exp 1/30/2023  (exp extended to 2/28/2023)      Subjective:  Pt reports she has not had any pain since last visit. Wears orthotics regularly and completes her HEP regularly. Objective:   Pain: 0/10  Adduction drop test: R = -  L = - at start of visit  Pelvic [place] Drop Test: R = -  L = - at start of visit  Gait: no deviations  Palpation: no tenderness noted at L SI joint    See chart for today's tx. Assessment: Pt able to maintain neutral pelvis again at the start of this visit, indicating she's able to maintain it over longer periods of time. Orthotics are likely contributing to this along with HEP. Pt has good awareness of her symptoms and which exercises are helpful in relieving them when they occur. Recommend pt cont HEP and gradually decrease frequency while monitoring symptoms and as long as she remains painfree, decreased frequency with exercises should be fine. L post capsule stretch seems especially effective in improving position and mobility at L SI joint. Functionally, pt is completing almost all daily, work, school activities with no c/o pain. Goals:   1. The pt will be independent in their HEP. 2.  The pt will perform household tasks such as laundry and cleaning w/ pain no greater than 1/10  3. 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 Plan: Will follow up in 1 week to assess for continued symptoms, if needed. If not, pt can be d/c'd with her HEP.    Date: 10/6/2022  TX#: 2/10 Date:10/11/2022             TX#: 3/10 Date:  10/13/2022           TX#: 4/10 Date: 10/18/2022            TX#: 5/10 Date: 10/20/2022  Tx#: 6/10 Date: 10/25/2022  Tx#: 7/10 Date: 10/27/2022  Tx#: 8/10 Date: 11/1/2022  Tx#: 9/10  Date: 11/29/2022  Tx# 10/10 Date: 12/13/2022  Tx#: 11/16 Date: 01/12/2023  Tx#: 12/16 Date: 01/19/2023  Tx # 13/16 Date: 0126/2023  Tx#: 14/16   TherEx; x 35 min  Reviewed HEP - child's pose stretch. Pt w/ good understanding and has been performing at home. Instructed in:  Supine 90/90 hemibridge and R sidelying L adductor pullback. Supine 90/90 hemibridge - verbal cues to limit distance pelvis is raised up on first exhale. Correct performance achieved - 3 Reps w/ 4-5 inhale/exhales per rep. R sidelying L adductor pullback - initially pt had L groin pain with first attempt, but after repositioning and moving L LE, then starting again, groin pain was eliminated. Pt able to maintain B feet/heels on wall throughout ex. Completed 3 Reps 4-5 breaths/repetition. Ther Ex x 35 min  Supine 90/90 hemibridge - good performance and correct technique. Only vc's needed to decreased rate of breathing and pause after exhale to allow breaking muscles to reset before next inhale. R sidelying L adductor pullback - very good technique and able to achieve correct motion for both pullback and maintain B feet on wall. Instructed in Standing supported R knee flexion w/ L hip approximation. Vc's for L trunk sidebend w/ L hip hike. Pt able to maintain position and feel R glute, R hamstring and L abdominals throughout ex. Instructed in L sidelying R glute max. Vc's to keep B feet on wall parallel to each other while rotating R knee up (facilitating R glute). Pt able to perform correct technique and able to feel activation of R glute and L abdominals. Ther Ex: x 33  Supine 90/90 hemibridge x 3 reps - good demonstration of exercise and good slow breathing throughout. Reviewed L sidelying R glute max x 4 reps vc's to keep R foot on wall and aligned with L foot while rotating R knee toward ceiling.     R knee flexion w/ L hip approximation x 4 reps - good demonstration of exercise. Feels R quads, R glutes work during exercise. `Ther Ex:  Supine Josué Extension w/ Alternating Respiratory Rectus Femoris and Sartorius -   Good demonstration of new ex. Pt w/ slight symptoms during with L LE in flexion, then symptoms resolved when exercise finished. Supine 90/90 Hemibridge - good performance. No symptoms  L sidelying R glute max -Good performance. No symptoms. Good control of hip motions. Ther Ex:  Standing supported R glute max w/ L hip approximation and L FA IR - Pt able to demonstrate good control throughout. Had slight increased symptoms after. L sidelying R glute max - good performance and good control throughout. Pt with pain relief after. Trial of Standing supported R knee flexion w/ L hip extension. Pt able to control motion and demonstrate good technique throughout. Slight increase in symptoms after. For HEP: Replace Standing R knee flexion w/ L hip approximation with Standing Supported R Glute Max w/ L hip approximation and L FA IR. Ther Ex:  90/90 Hemibridge repositioning. Good control and performance with ex. Then   L sidelying knee to knee in neutral LE position, w/ R LE shifted forward and w/ R LE shifted back. 4 Reps each position. Pt able to feel L adductor, R adductor and R Glute max and glute med with all ex's and had no inc    Reviewed Standing supported L hip approximation w/ FA IR. Pt with good performance and control throughout ex. No increase in symptoms during or after ex. Ther Ex:  Performed 90/90 hemibridge repositioning but drop test and PADT remained + on L.   90/90 Supported L Hip shift with R rectus and sartorius exercise - able to complete without pain and afterward, Adduction drop and PADT negative B. Reviewed L sidelying knee to knee. Pt without questions or issues with this at home.    There Ex:  Paraspinal release with L hamstring - no change in testing    All 4 R glut max - SAMM neutral education on shoe wear and need for OTC orthotics    Education on SAMM principles     Ther Ex:  R sidelying supported L Glute Med: Instructed in and pt performed with minimal verbal cues/corrections needed. Pt able to complete without increased symptoms and had good control throughout exercise. 4 Reps total     L sidelying Knee to Knee: instructed in/reviewed this exercise. Pt able to perform all 3 R hip positions - able to touch L knee to R in neutral R position, not able to fully touch L knee to R with R hip forward and behind L, but pt able to feel L adductor engage throughout. Ther Ex:  90/90 Hip lift with hip shift - pt with good technique and able to feel L hamstring engage throughout. 90/90 supported hip lift w/ hemibridge - pt able to complete with good technique. Verbal cues to minimize distance R foot comes off wall in order to maximize effort of L hamstring. Pt able to make adjustment on first attempt and able to feel L hamstring engage throughout. Standing supported L Hip approximation with L AF IR - pt able to feel L IO/TA engage throughout. R sidelying respiratory L adductor pullback.- Good control   Standing supported R knee flexion w/ L hip extension - pt with good technique throughout and able to feel R glute max facilitation. Ther Ex  --L standing posterior capsule stretch added to HEP. Pt able to achieve good position maintaining rounded back and feeling stretch in post capsule. Pt feels relief from compression with stretch. --L sidelying R glute max w/ R hip shifted forward - pt able to complete this easily so added:    --L sidelying supported R Glute Max w/ Hip extension and FA Abduction and L FA Adduction - pt able to complete with slight difficulty. Instructed pt to perform with R LE abd and if too fatigued, do that level for 1-2 days, then add the L LE adduction step after that. Pt verbalized understanding.    Ther Ex;  -L sidelying Supported R Glute Max w/ Hip extension and FA Abduction and L FA Adduction. - Reviewed this for set up and performance. Pt able to achieve all steps, with slight difficulty adding L LE adduction. Instructed her to complete all steps up to that point x 1 week, then try again and see if she is able to complete it with less difficulty. --L post capsule stretch. Pt with great technique and able to feel stretch in L post capsule. Ther Ex:  --Reviewed all HEP ex's:  --R Sidelying Respiratory L Adductor Pullback - pt with good control and good technique throughout. Able to achieve optimal positioning and alignment.    --90/90 Hip lift w/ Hip shift - advised this for times when pt feels she might not be in neutral.  Pt with good understanding of technique. --L post capsule stretch. Pt completes with good positioning and technique. Able to feel stretch in hip capsule and near L SI joint. --Left Sidelying Knee to Knee - Pt with good control and good technique. More challenging to bring L knee to R knee than it is to rotate R knee up. Able to complete with R knee shifted forward. Advised not to perform this with R knee shifted back, as that's the pelvis position we are trying to reverse. --Left Sidelying Supported R Glute Max w/ Hip extension and FA Abduction and L FA adducrtion - Pt able to achieve good alignment to start and has good control and technique. Maintaining both R hip abd w/ L hip add challenging but pt did well. --Standing supported R knee flexion w/ L hip extension - reviewed this and advised to hold on this exercise for now as the others she is doing are more beneficial at this time. Manual Therapy: x 15 min  Prone w/ pillow under abdomen - Joint mob to sacrum into counter-nutation - grade 1-2 oscillations, then STMob of L lumbar paraspinals concurrently with counter nutation mob of sacrum  Counter nutation with R rotation mobilizations in prone  Manual Therapy x 10 min  MET addressing L PSIS higher than R PSIS.    Prone over pillows - R hip flexion w/ L hip extension x 3 reps  Supine over end of table - R hip flexion w/ L hip extension x 3 reps  R hip extension w L hip flexion x 3 reps  hooklying B hip adduction into towel roll x 2 reps then B hip abduction w/ manual resistance. Manual Therapy x 12 min  Sacral joint mob - counter nutation grade II oscillation and sustained hold at end range in prone over 2 pillows. STMob of B lumbar paraspinals in lengthened position of child's pose with post pelvic tilt. Manual Therapy -  X 10 min    STMob B lumbar paraspinals and L/S paraspinals in prone. Manual Therapy x 10 min  Prone over 2 pillows - joint mob to sacrum into counter nutation. Manual Therapy x 10 min  Prone over 2 pillows- STMob to B lumbar and lower thoracic paraspinals. All 4's w post pelvic tilt - joint mob to sacrum into counter nutation - Grade I & II oscillations with STMob to B lumbar paraspinals. Manual therapy x 25 minutes    Prone leg pull  L anterior to posterior ilium mob  L on L sacral rotation MET  Central and unilateral PA's L4L5 and L5S1    L iliopsoas release                                        HEP: Child's pose stretch. 10/6/2022 - Added 1) child's pose stretch lateral - UE\"s to R, 2) Supine 90/90 Hemibridge Repositioning 3) R sidelying L adductor pullback   10/11/2022 - D/C R sidelying L adductor pullback and replace it with 1) Standing supported R knee flexion with L hip approximation. Added 2) L sidelying R glute max and continue child's pose stretch and supine 90/90 hemibridge. Charges:  TherEx x 3   Total Timed Treatment: 45 min  Total Treatment Time: 45 min

## 2023-02-02 NOTE — PROGRESS NOTES
Diagnosis:    Lumbar spondylosis (A89.492)           Referring Provider: Lucero Keen  Date of Evaluation:    9/29/2022    Precautions:  None Next MD visit:   11/1/2022  Date of Surgery: n/a   Insurance Primary/Secondary: BLUE CROSS MEDICAID / N/A     # Auth Visits: 10   Plus 6 additional visits approved w/ exp 1/30/2023  (exp extended to 2/28/2023)     Discharge Summary  Pt has attended 15 visits in Physical Therapy from 09/29/2022 -- 02/02/2023. Subjective:  Pt reports pain returned briefly after cleaning her apartment last weekend. She did her HEP and the pain resolved in about 3 hours. Most of the time, she has no pain now. Occasionally will have some pain. Objective:   Pain: 0/10 now, 2/10 earlier this morning    Adduction drop test: R = -  L = - at start of visit  Pelvic [place] Drop Test: R = -  L = - at start of visit-   Functional squat test Level 4  Gait: no deviations  Palpation: no tenderness noted at L SI joint  See chart for today's tx. Assessment: Pt able to maintain neutral pelvis again at the start of this visit, indicating she's able to maintain it over longer periods of time. Even with the pain returning with cleaning, which is an activity that has caused pain in the past, it resolved in a few hours and pt was able to achieve neutral position again independently. With more time and repeated performance of newer exercises focusing on strengthening glute max and glute med in both sidelying and standing, pt should be able to avoid pain with forward bending activities. Pt with very good understanding of technique with exercises and has excellent control with all of them. Pt also has been regularly wearing orthotics which is helping maintain neutral position. Pt should do well with continuing HEP and see gradual improvement with overall decreased pain. Goals:   1. The pt will be independent in their HEP. (Met)  2.   The pt will perform household tasks such as laundry and cleaning w/ pain no greater than 1/10 (Not met, but pain level returned to 1/10 within 3 hours of cleaning)  3. 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 (Met)      Plan:  D/C from PT. Date: 10/6/2022  TX#: 2/10 Date:10/11/2022             TX#: 3/10 Date:  10/13/2022           TX#: 4/10 Date: 10/18/2022            TX#: 5/10 Date: 10/20/2022  Tx#: 6/10 Date: 10/25/2022  Tx#: 7/10 Date: 10/27/2022  Tx#: 8/10 Date: 11/1/2022  Tx#: 9/10  Date: 11/29/2022  Tx# 10/10 Date: 12/13/2022  Tx#: 11/16 Date: 01/12/2023  Tx#: 12/16 Date: 01/19/2023  Tx # 13/16 Date: 0126/2023  Tx#: 14/16 Date; 02/02/2023  Tx#: 15/16   TherEx; x 35 min  Reviewed HEP - child's pose stretch. Pt w/ good understanding and has been performing at home. Instructed in:  Supine 90/90 hemibridge and R sidelying L adductor pullback. Supine 90/90 hemibridge - verbal cues to limit distance pelvis is raised up on first exhale. Correct performance achieved - 3 Reps w/ 4-5 inhale/exhales per rep. R sidelying L adductor pullback - initially pt had L groin pain with first attempt, but after repositioning and moving L LE, then starting again, groin pain was eliminated. Pt able to maintain B feet/heels on wall throughout ex. Completed 3 Reps 4-5 breaths/repetition. Ther Ex x 35 min  Supine 90/90 hemibridge - good performance and correct technique. Only vc's needed to decreased rate of breathing and pause after exhale to allow breaking muscles to reset before next inhale. R sidelying L adductor pullback - very good technique and able to achieve correct motion for both pullback and maintain B feet on wall. Instructed in Standing supported R knee flexion w/ L hip approximation. Vc's for L trunk sidebend w/ L hip hike. Pt able to maintain position and feel R glute, R hamstring and L abdominals throughout ex. Instructed in L sidelying R glute max.  Vc's to keep B feet on wall parallel to each other while rotating R knee up (facilitating R glute). Pt able to perform correct technique and able to feel activation of R glute and L abdominals. Ther Ex: x 33  Supine 90/90 hemibridge x 3 reps - good demonstration of exercise and good slow breathing throughout. Reviewed L sidelying R glute max x 4 reps vc's to keep R foot on wall and aligned with L foot while rotating R knee toward ceiling. R knee flexion w/ L hip approximation x 4 reps - good demonstration of exercise. Feels R quads, R glutes work during exercise. `Ther Ex:  Supine Josué Extension w/ Alternating Respiratory Rectus Femoris and Sartorius -   Good demonstration of new ex. Pt w/ slight symptoms during with L LE in flexion, then symptoms resolved when exercise finished. Supine 90/90 Hemibridge - good performance. No symptoms  L sidelying R glute max -Good performance. No symptoms. Good control of hip motions. Ther Ex:  Standing supported R glute max w/ L hip approximation and L FA IR - Pt able to demonstrate good control throughout. Had slight increased symptoms after. L sidelying R glute max - good performance and good control throughout. Pt with pain relief after. Trial of Standing supported R knee flexion w/ L hip extension. Pt able to control motion and demonstrate good technique throughout. Slight increase in symptoms after. For HEP: Replace Standing R knee flexion w/ L hip approximation with Standing Supported R Glute Max w/ L hip approximation and L FA IR. Ther Ex:  90/90 Hemibridge repositioning. Good control and performance with ex. Then   L sidelying knee to knee in neutral LE position, w/ R LE shifted forward and w/ R LE shifted back. 4 Reps each position. Pt able to feel L adductor, R adductor and R Glute max and glute med with all ex's and had no inc    Reviewed Standing supported L hip approximation w/ FA IR. Pt with good performance and control throughout ex. No increase in symptoms during or after ex.     Ther Ex:  Performed 90/90 hemibridge repositioning but drop test and PADT remained + on L.   90/90 Supported L Hip shift with R rectus and sartorius exercise - able to complete without pain and afterward, Adduction drop and PADT negative B. Reviewed L sidelying knee to knee. Pt without questions or issues with this at home. There Ex:  Paraspinal release with L hamstring - no change in testing    All 4 R glut max - SAMM neutral     education on shoe wear and need for OTC orthotics    Education on SAMM principles     Ther Ex:  R sidelying supported L Glute Med: Instructed in and pt performed with minimal verbal cues/corrections needed. Pt able to complete without increased symptoms and had good control throughout exercise. 4 Reps total     L sidelying Knee to Knee: instructed in/reviewed this exercise. Pt able to perform all 3 R hip positions - able to touch L knee to R in neutral R position, not able to fully touch L knee to R with R hip forward and behind L, but pt able to feel L adductor engage throughout. Ther Ex:  90/90 Hip lift with hip shift - pt with good technique and able to feel L hamstring engage throughout. 90/90 supported hip lift w/ hemibridge - pt able to complete with good technique. Verbal cues to minimize distance R foot comes off wall in order to maximize effort of L hamstring. Pt able to make adjustment on first attempt and able to feel L hamstring engage throughout. Standing supported L Hip approximation with L AF IR - pt able to feel L IO/TA engage throughout. R sidelying respiratory L adductor pullback.- Good control   Standing supported R knee flexion w/ L hip extension - pt with good technique throughout and able to feel R glute max facilitation. Ther Ex  --L standing posterior capsule stretch added to HEP. Pt able to achieve good position maintaining rounded back and feeling stretch in post capsule. Pt feels relief from compression with stretch.     --L sidelying R glute max w/ R hip shifted forward - pt able to complete this easily so added:    --L sidelying supported R Glute Max w/ Hip extension and FA Abduction and L FA Adduction - pt able to complete with slight difficulty. Instructed pt to perform with R LE abd and if too fatigued, do that level for 1-2 days, then add the L LE adduction step after that. Pt verbalized understanding. Ther Ex;  -L sidelying Supported R Glute Max w/ Hip extension and FA Abduction and L FA Adduction. - Reviewed this for set up and performance. Pt able to achieve all steps, with slight difficulty adding L LE adduction. Instructed her to complete all steps up to that point x 1 week, then try again and see if she is able to complete it with less difficulty. --L post capsule stretch. Pt with great technique and able to feel stretch in L post capsule. Ther Ex:  --Reviewed all HEP ex's:  --R Sidelying Respiratory L Adductor Pullback - pt with good control and good technique throughout. Able to achieve optimal positioning and alignment.    --90/90 Hip lift w/ Hip shift - advised this for times when pt feels she might not be in neutral.  Pt with good understanding of technique. --L post capsule stretch. Pt completes with good positioning and technique. Able to feel stretch in hip capsule and near L SI joint. --Left Sidelying Knee to Knee - Pt with good control and good technique. More challenging to bring L knee to R knee than it is to rotate R knee up. Able to complete with R knee shifted forward. Advised not to perform this with R knee shifted back, as that's the pelvis position we are trying to reverse. --Left Sidelying Supported R Glute Max w/ Hip extension and FA Abduction and L FA adducrtion - Pt able to achieve good alignment to start and has good control and technique. Maintaining both R hip abd w/ L hip add challenging but pt did well.     --Standing supported R knee flexion w/ L hip extension - reviewed this and advised to hold on this exercise for now as the others she is doing are more beneficial at this time. Ther Ex:  Completed Left Sidelying Supported R Glute Max w/ Hip extension and FA Abduction and L FA adducrtion - Pt with excellent control and technique. Feels more challenge with R LE abduction/ER compared to L LE adduction and IR. Able to complete 3 reps with good technique throughout. Reviewed Standing supported R Glute Max w. L Hip approximation the L hip extension and also with Left FA IR. Pt with good understanding of both variations. Completed Standing supported Right knee flexion with Left Glute Med and Right trunk rotation - excellent control and technique with this as well. Pt able to maintain L hip approximation and feel glutes engage. Manual Therapy: x 15 min  Prone w/ pillow under abdomen - Joint mob to sacrum into counter-nutation - grade 1-2 oscillations, then STMob of L lumbar paraspinals concurrently with counter nutation mob of sacrum  Counter nutation with R rotation mobilizations in prone  Manual Therapy x 10 min  MET addressing L PSIS higher than R PSIS. Prone over pillows - R hip flexion w/ L hip extension x 3 reps  Supine over end of table - R hip flexion w/ L hip extension x 3 reps  R hip extension w L hip flexion x 3 reps  hooklying B hip adduction into towel roll x 2 reps then B hip abduction w/ manual resistance. Manual Therapy x 12 min  Sacral joint mob - counter nutation grade II oscillation and sustained hold at end range in prone over 2 pillows. STMob of B lumbar paraspinals in lengthened position of child's pose with post pelvic tilt. Manual Therapy -  X 10 min    STMob B lumbar paraspinals and L/S paraspinals in prone. Manual Therapy x 10 min  Prone over 2 pillows - joint mob to sacrum into counter nutation. Manual Therapy x 10 min  Prone over 2 pillows- STMob to B lumbar and lower thoracic paraspinals.    All 4's w post pelvic tilt - joint mob to sacrum into counter nutation - Grade I & II oscillations with STMob to B lumbar paraspinals. Manual therapy x 25 minutes    Prone leg pull  L anterior to posterior ilium mob  L on L sacral rotation MET  Central and unilateral PA's L4L5 and L5S1    L iliopsoas release                                           HEP: Child's pose stretch. 10/6/2022 - Added 1) child's pose stretch lateral - UE\"s to R, 2) Supine 90/90 Hemibridge Repositioning 3) R sidelying L adductor pullback   10/11/2022 - D/C R sidelying L adductor pullback and replace it with 1) Standing supported R knee flexion with L hip approximation. Added 2) L sidelying R glute max and continue child's pose stretch and supine 90/90 hemibridge. Charges:  TherEx x 3   Total Timed Treatment: 45 min  Total Treatment Time: 45 min

## 2023-02-06 NOTE — TELEPHONE ENCOUNTER
From: Refugio Duval  To: Compa Finn,   Sent: 2/5/2023 10:26 PM CST  Subject: Positive Pregnancy Test    Maribel Marmolejo,   I had my first positive pregnancy test this weekend! What are the next steps I should take, should I come in for an appointment?    Thank you,  Cindy

## 2023-02-08 NOTE — TELEPHONE ENCOUNTER
The patient called and wanted to discuss with the doctor about the cyst in her left breast. She stated the doctor mentioned an ultrasound but she was not sure of where and when it should be done.

## 2023-02-08 NOTE — TELEPHONE ENCOUNTER
Left message to call back. Please transfer to triage. 1st attempt. I was not able to order the ultrasound as it ask some questions. Is pt pregnant?

## 2023-03-23 NOTE — PROGRESS NOTES
Pt is a   here today for [employer]. Pregnancy Confirmation apt with: 2/10 with RD    LMP:     US: 3/10 (9w2d)    Working PARRIS: 10/16/23    Pre  BMI: 19.46    Medical Hx significant for: acne, depression, migraines    Obstetrical Hx significant for: NA    Surgical Hx significant for: eye surgery    EPDS score: 3    Early GTT screening: does not meet criteria    Preeclampsia prevention screening: does not meet criteria. OUD Screening: Patient has answered NO to 5p questions and has no  risk factors. Patient given \"What Pregnant Women Need to Know\" handout. Educational material reviewed with patient: Prenatal care, nutrition, weight gain recommendations, travel, exercise, intercourse, pregnancy changes, safe medications, pregnancy and work, fetal movement, labor and  labor, warning signs, food safety, tdap, cord blood, breastfeeding, circumcision, and Group B strep. Pt agrees to blood transfusion if needed: Yes    PN labs ordered: Yes    Optional genetic screening discussed. Pt desires Prequel and previously completed F[facility]ight. [facility] Media Policy: Reviewed and verbalized understanding.      NOB appt: 3/23 with RD    Lab appt: 3/23

## 2023-03-24 NOTE — TELEPHONE ENCOUNTER
Pt calling requesting to speak to a RN about some questions she is having about obtaining WIC after confirmed pregnancy. Please follow up with pt.

## 2023-03-31 NOTE — TELEPHONE ENCOUNTER
The patient called and stated that she was supposed to get a letter sent to her by monday but she has not heard anything. Her appointment with [facility] is today and she needs that as soon as possible.

## 2023-03-31 NOTE — TELEPHONE ENCOUNTER
RN called number pt requested, which turned out to be her 's number. RN informed him that letter was sent. He verbalized understanding and agreed with POC.

## 2023-04-03 NOTE — TELEPHONE ENCOUNTER
Called pt and informed of normal prequel, declined XX gender. Per pt waiting for ultrasound at 20 weeks.

## 2023-04-21 NOTE — PROGRESS NOTES
32year old  at 14w4d     Contractions: No  VB: No  LOF: No  Fetal movement: No    Return OB  Pre-Edu Care: UTD.   - plan for anatomy US 5-6 weeks.     Patient Active Problem List:     Lumbar spondylosis     Migraine without aura and without status migrainosus, not intractable     Chronic bilateral low back pain with left-sided sciatica     neg cfDNA  pregnancy      - Return to clinic in: 5-6 weeks    Gloria Trinidad DO

## 2023-05-30 NOTE — PROGRESS NOTES
29year old  at 20w1d     Contractions: No  VB: No  LOF: No  Fetal movement: yes, early flutters    Return OB  Pre- Care: UTD.   - anatomy US today WNL  - plan for 1 hr GTT at or after next appt.     Patient Active Problem List:     Lumbar spondylosis     Migraine without aura and without status migrainosus, not intractable     Chronic bilateral low back pain with left-sided sciatica     neg cfDNA  pregnancy      - Return to clinic in: 4 weeks    Loren Nelson DO

## 2023-06-15 NOTE — TELEPHONE ENCOUNTER
Called pt c/o fever-102.1-100.6, coughing and sore throat. Runny nose, fatigue. Pt took tylenol 1,000 mg recently, able to swallow. Advised to monitor for cramps, vaginal bleeding and call back if it occurs. Reviewed safe meds to take during pregnancy Robitussin, alcohol free lozenges, hydrate and rest.  Advised to go to IC for COVID testing and strep throat. Pt agrees.

## 2023-06-15 NOTE — TELEPHONE ENCOUNTER
The patient's  called stating that the patient is 22 weeks pregnant and she has a fever,cought and sore throat and wanted to know what to do.

## 2023-07-19 NOTE — PROGRESS NOTES
Pt here for low back pain. For 3 years. Pt had Injections and PT in the past- did not help. Pt does have some pain/ sensation going down the left leg. No back surgery.  Pt had MRI

## 2023-07-20 NOTE — TELEPHONE ENCOUNTER
From: Fly Diaz  To: Rc Marrero DO  Sent: 7/20/2023 8:27 AM CDT  Subject: Prenatal Vitamins Request    Hello,    I contacted my insurance to see if they would cover prenatal vitamins and they do, I just need a prescription from my doctor. Is Dr. Ubaldo Westbrook able to write me a prescription for prenatal vitamins? Thank you!   Janora Kocher

## 2023-07-26 NOTE — PROGRESS NOTES
Called pt and provided RD's instructions. Ordered Diabetic ED, provided pt with number and verbalized understanding.

## 2023-07-31 NOTE — PROGRESS NOTES
Ronnell Sher  : 1995 was seen for Gestational Diabetes Counseling: Individual/Group    Date: 2023   Start time: 12:40 pm  End time: 1:40 pm     Obtained usual diet history: Patient eats 3 meals a day. Most meals are home cooked. Patient drinks water with her meals no sweetened beverages juice or coffee. She does have a cookie or ice cream for desert 2-3 times a week. Per patient she considers herself a healthy individual , always watching what she eats and incorporating 30 minutes of physical activity to her daily routine. Education:     GDM Overview:  Reviewed gestational diabetes as diagnosis including target blood glucose values. Benefits, risks, and management options for improving/maintaining glucose control to mother/baby discussed. Instructed /demonstrated ability to perform blood glucose testing on: contour next EZ meter      Healthy Eating:  Discussed nutrition concepts for pregnancy/healthy eating and effects of food on BG value. Timing of meals; what is a carbohydrate, protein, fat. Taught: Carb counting, label reading, meal planning. Suggested Calorie Level: 2000    Being Active:  Benefits and effects of activity on BG discussed. Monitoring:  Instructed on how to use glucose monitor/proper lancet disposal. Testing schedules are:   Fastin-95 mg/dL, Call MD if >95 mg/dL twice in 1 week   2 Hour Post Prandial:  120 md/dL, Call MD if >120 mg/dL twice in 1 week. Taking Medication:  Reviewed when medication might be indicated. Reducing Risk:  Effects of elevated blood glucose on mother/baby reviewed. Discussed management (hyperglycemia, hypoglycemia, sick day, DKA, other) and when to call provider. Post pregnancy management/prevention of Type 2 DM, and increased risk of having diabetes later in life reviewed. Healthy Coping:  Family involvement/social support encouraged.   Identification of lifestyle behaviors willing to change discussed. Training Tools Provided:  Printed materials covering each topic provided. Support Plan provided and reviewed. Patient Chosen Goals:      1. Follow recommended GDM meal plan. 2. Begin testing fasting glucose and 2 hours after meals   3. Bring glucose log to each MD office visit. 4. Encouraged activity if no restrictions. 5. Encouraged [place] to call diabetes center with any questions or concerns. Patient verbalized understanding and has no further questions at this time.     Nicole Corona RN

## 2023-08-02 NOTE — TELEPHONE ENCOUNTER
RN spoke with pt. Pt wants a continuous glucose monitor rather than having to check her BS 4x daily with finger sticks. RN told pt that it is unlikely that her insurance will cover the continuous monitor, as she is not on insulin, but RN told pt that RN would ask RD if she is willing to place the order. RN told pt that if the order is refused by her insurance, RN can file a PA, but reiterated that it is unlikely to get covered. RN told pt that RN would speak to RD and would keep pt in the loop via [place]. Pt verbalized understanding and agreed with POC.

## 2023-08-02 NOTE — TELEPHONE ENCOUNTER
Pt is calling requesting to speak with a RN in regards to questions she have for a diabetes monitor(free style Alexandria Sebastian) the pt stated the nutritionist  sent over. Please assist.

## 2023-08-02 NOTE — TELEPHONE ENCOUNTER
RD agreed to prescribe continuous monitor, but RN is unsure how to order it. RN called and LVM for diabetic educator.

## 2023-08-03 NOTE — TELEPHONE ENCOUNTER
PA filed: Calvin Clifford Humphries: [id number] - PA Case ID: [id number] - Rx #: [id number]    PA received a favorable outcome. Pt informed via BAC ON TRACt.

## 2023-08-09 NOTE — PROGRESS NOTES
29year old  at 30w2d     Contractions: maybe diane li occasional  VB: No  LOF: No  Fetal movement: Yes    Glucose: using CGM, brought log over the last week  Fasting all <95  2 hr pp bf - all <120  2 hr pp lunch - one value 126  2 hr pp dinner - all <120    Return OB  Pre- Care: UTD.   - has EFW in the next 2 weeks    GDMA1  - cont glucose monitoring  - is concerned about macrosomia due to GDM - reviewed that good glucose control and minimizing her weight gain are best ways to control the baby's growth. FH as expected for now and EFW check in 2 weeks. Can cont to discuss, if still concerned, can IOL 39 wks.     Patient Active Problem List:     Lumbar spondylosis     Migraine without aura and without status migrainosus, not intractable     Chronic bilateral low back pain with left-sided sciatica     neg cfDNA  pregnancy     Encounter for supervision of normal first pregnancy in third trimester     Diet controlled gestational diabetes mellitus (GDM) in third trimester      - Return to clinic in: 2 weeks    Trygve Angry, DO

## 2023-08-23 NOTE — TELEPHONE ENCOUNTER
Voice mail left and mychart message sent regarding ultrasound being cancelled due to building being evacuated due to no air conditioning

## 2023-08-24 NOTE — PROGRESS NOTES
Doing well. No OB complaints. +FM. Normal BS per patient. Growth US tomorrow. MIRTHA 2 weeks. Dr. Jose Richard MD    [facility] 10 OBGYN     This note was created by [facility] voice recognition. Errors in content may be related to improper recognition by the system; efforts to review and correct have been done but errors may still exist. Please be advised the primary purpose of this note is for me to communicate medical care. Standard sentence structure is not always used. Medical terminology and medical abbreviations may be used. There may be grammatical, typographical, and automated fill ins that may have errors missed in proofreading.

## 2023-09-07 NOTE — PROGRESS NOTES
29year old  at 34w4d     Contractions: maybe diane li occasional  VB: No  LOF: No  Fetal movement: Yes    Glucose log:  3 values post-lunch - 121, 123, 129    Carpal tunnel b/l - worse in the morning.     Return OB  Pre-Edu Care: UTD.   - plan for GBS next week  - recommend braces for carpal tunnel, if no improvement can do PT.    GDMA1  - cont glucose monitoring    Patient Active Problem List:     Lumbar spondylosis     Migraine without aura and without status migrainosus, not intractable     Chronic bilateral low back pain with left-sided sciatica     neg cfDNA  pregnancy     Encounter for supervision of normal first pregnancy in third trimester     Diet controlled gestational diabetes mellitus (GDM) in third trimester      - Return to clinic in: 2 weeks    Jemal Akbar DO

## 2023-09-20 NOTE — PROGRESS NOTES
29year old  at 36w2d     Contractions: maybe diane li occasional  VB: No  LOF: No  Fetal movement: Yes    Cervix: closed / 50 / -3    Glucose log:  Glucose lo post-lunch 137  1 post-dinner 163 (had ice cream!)      Return OB  Pre-Edu Care: UTD.   -  GBS and third tri labs today. - reviewed birth plan today. Copy made to scan into chart.     GDMA1  - cont glucose monitoring    Patient Active Problem List:     Lumbar spondylosis     Migraine without aura and without status migrainosus, not intractable     Chronic bilateral low back pain with left-sided sciatica     neg cfDNA  pregnancy     Encounter for supervision of normal first pregnancy in third trimester     Diet controlled gestational diabetes mellitus (GDM) in third trimester      - Return to clinic in: 1 weeks    Divine Holcomb,

## 2023-09-25 NOTE — TELEPHONE ENCOUNTER
HIMA to provide medical supply store and fax number        From: Humberto Pérez  To: Leticia Bargerverenice  Sent: 9/22/2023  3:57 PM CDT  Subject: Brian Verduzco,    Dr. Masoud Felton wrote a prescription for me to get some wrist braces for carpal tunnel from pregnancy. I went to the pharmacy, and they said the prescription needs to be taken to a medical supply store instead. Can you send me a copy of the prescription? I'd need it before Monday at noon, if possible. Thanks!     Jaswatn Gilbert

## 2023-09-25 NOTE — TELEPHONE ENCOUNTER
Pt is calling back requesting to speak with a RN. pt stated the store name is [address] [place] [address] and [facility]#[phone] for durable medical equipment. Please assist.

## 2023-09-27 NOTE — PROGRESS NOTES
29year old  at 37w2d     Contractions: no  VB: No  LOF: No  Fetal movement: Yes      Glucose log:  Glucose log: All but one value in target range  1 post-dinner 163 (had ice cream)      Return OB  Pre-Edu Care: UTD.   -  GBS and third tri labs today.   - has birth plan is pending scanning into epic    GDMA1  - cont glucose monitoring    Patient Active Problem List:     Lumbar spondylosis     Migraine without aura and without status migrainosus, not intractable     Chronic bilateral low back pain with left-sided sciatica     neg cfDNA  pregnancy     Encounter for supervision of normal first pregnancy in third trimester     Diet controlled gestational diabetes mellitus (GDM) in third trimester      - Return to clinic in: 1 weeks    Gloria Trinidad DO

## 2023-10-05 NOTE — PROGRESS NOTES
29year old  at 38w3d     Contractions: no  VB: No  LOF: No  Fetal movement: Yes    Glucose lo post-lunch and 2 post-dinner over the past week (all 3 in the 120s)      Return OB  Pre- Care: UTD.   - has birth plan is pending scanning into epic    GDMA1  - cont glucose monitoring    Patient Active Problem List:     Lumbar spondylosis     Migraine without aura and without status migrainosus, not intractable     Chronic bilateral low back pain with left-sided sciatica     neg cfDNA  pregnancy     Encounter for supervision of normal first pregnancy in third trimester     Diet controlled gestational diabetes mellitus (GDM) in third trimester      - Return to clinic in: 1 weeks    Brooke Buckley DO

## 2023-10-12 NOTE — PROGRESS NOTES
Doing well. No OB complaints/lost mucus plug. +FM. SSE nitrazine negative. No pooling. SVE FT/thick/high. MIRTHA 1 weeks. Dr. Audria Angelucci, MD    [facility] 10 OBGYN     This note was created by [facility] voice recognition. Errors in content may be related to improper recognition by the system; efforts to review and correct have been done but errors may still exist. Please be advised the primary purpose of this note is for me to communicate medical care. Standard sentence structure is not always used. Medical terminology and medical abbreviations may be used. There may be grammatical, typographical, and automated fill ins that may have errors missed in proofreading.

## 2023-10-13 NOTE — ANESTHESIA PROCEDURE NOTES
Labor Analgesia    Date/Time: 10/13/2023 4:43 PM    Performed by: Brenda Boyer DO  Authorized by: Brenda Boyer DO      General Information and Staff    Start Time:  10/13/2023 4:43 PM  End Time:  10/13/2023 4:52 PM  Anesthesiologist:  Brenda Boyer DO  Performed by:   Anesthesiologist  Patient Location:  OB  Site Identification: surface landmarks    Reason for Block: labor epidural    Preanesthetic Checklist: patient identified, IV checked, site marked, risks and benefits discussed, monitors and equipment checked, pre-op evaluation, timeout performed, IV bolus, anesthesia consent and sterile technique used      Procedure Details    Patient Position:  Sitting  Prep: ChloraPrep and patient draped    Monitoring:  Heart rate and continuous pulse ox  Approach:  Midline    Epidural Needle    Injection Technique:  CHERRI air  Needle Type:  Tuohy  Needle Gauge:  18 G  Needle Length:  3.5 in  Needle Insertion Depth:  4.5  Location:  L2-3    Spinal Needle    Needle Type:  Pencil-tip  Needle Gauge:  24 G  Needle Length:  4.75 in    Catheter    Catheter Type:  Multi-orifice  Catheter Size:  20 G  Catheter at Skin Depth:  8  Test Dose:  Negative    Assessment      Additional Comments

## 2023-10-13 NOTE — PROGRESS NOTES
Pt. is a 29year old female admitted to [address] [place] [address]. Patient presents with:  R/o Rom: Leaking beginning at 1900. R/o Labor: Strong contractions since 1630. Pt. is  39w3d intra-uterine pregnancy. History obtained, consents signed. Oriented to room, staff, and plan of care.

## 2023-10-13 NOTE — PROGRESS NOTES
Pt is a 29year old female admitted to [facility]. Patient presents with:  R/o Rom: Leaking beginning at 1900  R/o Labor: Strong contractions since 1630     Pt is  39w3d intra-uterine pregnancy. History obtained, consents signed. Oriented to room, staff, and plan of care.

## 2023-10-13 NOTE — PLAN OF CARE
Problem: BIRTH - VAGINAL/ SECTION  Goal: Fetal and maternal status remain reassuring during the birth process  Description: INTERVENTIONS:  - Monitor vital signs  - Monitor fetal heart rate  - Monitor uterine activity  - Monitor labor progression (vaginal delivery)  - DVT prophylaxis (C/S delivery)  - Surgical antibiotic prophylaxis (C/S delivery)  Outcome: Progressing     Problem: PAIN - ADULT  Goal: Verbalizes/displays adequate comfort level or patient's stated pain goal  Description: INTERVENTIONS:  - Encourage pt to monitor pain and request assistance  - Assess pain using appropriate pain scale  - Administer analgesics based on type and severity of pain and evaluate response  - Implement non-pharmacological measures as appropriate and evaluate response  - Consider cultural and social influences on pain and pain management  - Manage/alleviate anxiety  - Utilize distraction and/or relaxation techniques  - Monitor for opioid side effects  - Notify MD/LIP if interventions unsuccessful or patient reports new pain  - Anticipate increased pain with activity and pre-medicate as appropriate  Outcome: Progressing     Problem: ANXIETY  Goal: Will report anxiety at manageable levels  Description: INTERVENTIONS:  - Administer medication as ordered  - Teach and rehearse alternative coping skills  - Provide emotional support with 1:1 interaction with staff  Outcome: Progressing     Problem: Patient Centered Care  Goal: Patient preferences are identified and integrated in the patient's plan of care  Description: Interventions:  - What would you like us to know as we care for you? epidural  - Provide timely, complete, and accurate information to patient/family  - Incorporate patient and family knowledge, values, beliefs, and cultural backgrounds into the planning and delivery of care  - Encourage patient/family to participate in care and decision-making at the level they choose  - Honor patient and family perspectives and choices  Outcome: Progressing     Problem: Diabetes/Glucose Control  Goal: Glucose maintained within prescribed range  Description: INTERVENTIONS:  - Monitor Blood Glucose as ordered  - Assess for signs and symptoms of hyperglycemia and hypoglycemia  - Administer ordered medications to maintain glucose within target range  - Assess barriers to adequate nutritional intake and initiate nutrition consult as needed  - Instruct patient on self management of diabetes  Outcome: Progressing     Problem: Patient/Family Goals  Goal: Patient/Family Long Term Goal  Goal: Patient/Family Short Term Goal  Patient's Long Term Goal: Uncomplicated Delivery     Interventions:  - Assessment/Monitoring  - Induction/Augmentation per protocol and Provider order  - C/S per protocol and Provider order   - Education  - Intervention per protocol and Provider order with education   - Involve patient in POC  - See additional Care Plan goals for specific interventions  Patient's Short Term Goal: Comfort and Pain Control     Interventions:   - Non Pharmacological pain intervention   - IV/IM and Epidural pain medication per Provider order and patient request  - Education  - Involve Patient in POC   - See additional Care Plan goals for specific interventions

## 2023-10-14 NOTE — LACTATION NOTE
LACTATION NOTE - MOTHER      Evaluation Type: Inpatient    Problems identified  Problems identified: Knowledge deficit    Maternal history  Maternal history: Gestational diabetes;Depression    Breastfeeding goal  Breastfeeding goal: To maintain breast milk feeding per patient goal    Maternal Assessment  Bilateral Breasts: Soft;Symmetrical  Bilateral Nipples: Colostrum easily expressed; Everted  Right Nipple: Nipple bleb; Sore  Prior breastfeeding experience (comment below): Primip  Breastfeeding Assistance: Breastfeeding assistance provided with permission    Pain assessment  Pain, additional: Pain location  Pain Location: Nipples  Location/Comment: right  Treatment of Sore Nipples: Deeper latch techniques; Hydrogel dressings as directed    Guidelines for use of:  Equipment: Hydrogel dressings  Other (comment): Mom independently BF c/o mild nipple pain. Minor position adjustments made to achieve deeper latch in cross-cradle hold, mom able to return demonstration. Small nipple bleb observed on right breast, provided gel pads for comfort.

## 2023-10-14 NOTE — LACTATION NOTE
This note was copied from a baby's chart. LACTATION NOTE - INFANT    Evaluation Type  Evaluation Type: Inpatient    Problems & Assessment  Problems Diagnosed or Identified: Shallow latch  Infant Assessment: Hunger cues present  Muscle tone: Appropriate for GA    Feeding Assessment  Summary Current Feeding: Adlib;Breastfeeding exclusively  Breastfeeding Assessment: Assisted with breastfeeding w/mother's permission;Sustained nutrititive latch w/audible swallows; Coordinated suck/swallow; Tolerated feeding well  Breastfeeding lasted # of minutes: 156  Breastfeeding Positions: right breast;left breast;cross cradle;cradle  Latch: Repeated attempts, hold nipple in mouth, stimulate to suck  Audible Sucks/Swallows: Spontaneous and intermittent (24 hours old)  Type of Nipple: Everted (after stimulation)  Comfort (Breast/Nipple): Filling, red/small blisters/bruises, mild/mod discomfort  Hold (Positioning): Full assist, teach one side, mother does other, staff holds  [facility] Score: 7

## 2023-10-14 NOTE — ANESTHESIA POSTPROCEDURE EVALUATION
Patient: Tristan Jefferson    Procedure Summary       Date: 10/13/23 Room / Location:     Anesthesia Start: 9821 Anesthesia Stop: 2004    Procedure: LABOR ANALGESIA Diagnosis:     Scheduled Providers:  Anesthesiologist: Autumn Roa DO    Anesthesia Type: epidural ASA Status: 2            Anesthesia Type: epidural    Vitals Value Taken Time   /61 10/13/23 2100   Temp 98.9 10/13/23 2233   Pulse 78 10/13/23 2100   Resp 16 10/13/23 2100   SpO2 100 % 10/13/23 2100   Vitals shown include unfiled device data.     [address] AN Post Evaluation:   Patient Evaluated in floor  Patient Participation: complete - patient participated  Level of Consciousness: awake and alert  Pain Score: 0  Pain Management: satisfactory to patient  Airway Patency:patent  Yes    Cardiovascular Status: hemodynamically stable  Respiratory Status: nonlabored ventilation, spontaneous ventilation and room air  Postoperative Hydration stable      Patsie Daily, DO  10/14/2023 2:11 AM

## 2023-10-14 NOTE — L&D DELIVERY NOTE
May Sheila, Girl [[id number]]      Labor Events     labor?: No   steroids?: None  Rupture date/time: 10/12/2023 1900     Rupture type: SROM  Fluid color: Clear  Labor type: Spontaneous Onset of Labor  Augmentation: Oxytocin  Indications for augmentation: Ineffective Contraction Pattern       Labor Event Times    Labor onset date/time: 10/13/2023 0852  Dilation complete date/time: 10/13/2023 1900  Start pushing date/time: 10/13/2023 193       [place] Presentation    Presentation: Vertex  Position: Right Occiput Anterior       Operative Delivery    Operative Vaginal Delivery: No                      Shoulder Dystocia    Shoulder Dystocia: No             Anesthesia    Method: Epidural              [place] Delivery      Head delivery date/time: 10/13/2023 20:01:05   Delivery date/time:  10/13/23 20:01:15   Delivery type: Normal spontaneous vaginal delivery    Details:     Delivery location: delivery room  Delivery Room Temperature: 74       Delivery Providers    Delivering Clinician: Daja Lantigua MD   Delivery personnel:  Provider Role    Baby Nurse    Delivery Nurse             Cord    No data filed       Resuscitation    Method: Suctioning        Measurements    No data filed       Placenta    Date/time: 10/13/2023 2004  Removal: Spontaneous  Appearance: Intact  Disposition: Discarded       Apgars    Living status: Living   Apgar Scoring Key:    0 1 2    Skin color Blue or pale Acrocyanotic Completely pink    Heart rate Absent <100 bpm >100 bpm    Reflex irritability No response Grimace Cry or active withdrawal    Muscle tone Limp Some flexion Active motion    Respiratory effort Absent Weak cry; hypoventilation Good, crying              1 Minute:  5 Minute:  10 Minute:  15 Minute:  20 Minute:      Skin color: 0  1       Heart rate: 2  2       Reflex irritablity: 2  2       Muscle tone: 2  2       Respiratory effort: 2  2       Total: 8  9          Apgars assigned by: FIDELIA WAY  Chandler disposition: with mother       Skin to Skin    Skin to skin initiated date/time: 10/13/2023 2002  Skin to skin with: Mother       Vaginal Count       Sponges   Sharps    Initial counts 10   0    Final counts               Delivery (Maternal)    Episiotomy: None  Perineal lacerations: 2nd Repaired?: Yes     Vaginal laceration?: Yes Repaired?: Yes     Cervical laceration?: No    Clitoral laceration?: No                  [facility]   Vaginal Delivery Procedure Note      Tristan Jefferson Patient Status:  Inpatient    1995 MRN [id number]   Location [facility] Attending Riana Feliciano MD   Hosp Day # 1 PCP Donovan Simms DO     Date of Delivery: 10/13/23    Pre procedure diagnosis:  IUP at Term    Post procedure diagnosis: Same, delivered,     Procedure: Normal Spontaneous Vaginal Delivery    Attending: Calvin Villalobos MD      Anesthesia: Epidural    QBL: 204  cc    Indications:  Patient is a 29year old  at 43w3d who presented in active labor/SROM, she progressed to complete cervical dilation with pitocin augmentation       Findings:    Sex: female     Weight:8lb 8 oz      Apgars: 8/9      Lacerations: 2nd degree vaginal laceration, no periurethral, no cervical       Procedure: The patient was confirmed to be completely dilated. The patients was placed in the dorsolithotomy position. She was then encouraged to push. As the head was delivered in LETHA position, the perineum was supported to decrease the risk of tearing. The shoulders rotated spontaneously and delivery was completed without complication. The cord was doubly clamped then cut after 60 seconds. The baby was placed on the mother's abdomen at her request.  IV pitocin bolus was initiated. The cord blood was sampled. The placenta then delivered intact. The uterus was noted to be firm. Good hemostasis was noted.  The perineum, vaginal mucosa and cervix was then examined. A 2nd degree vaginal laceration repaired with 2-0 and 3-0 vicryl. Bleeding was minimal.  The patient was then moved to the supine position in stable condition. Sponge, needle, and instrument counts were correct. Complications:  None     Mother and infant in good condition in the delivery room.     Mosie Denver, MD    [facility]

## 2023-10-15 NOTE — LACTATION NOTE
10/15/23 1003   Evaluation Type   Evaluation Type Inpatient   Problems identified   Problems identified Knowledge deficit; Nipple pain   Breastfeeding goal   Breastfeeding goal To maintain breast milk feeding per patient goal   Maternal Assessment   Prior breastfeeding experience (comment below) Primip   Breastfeeding Assistance Breastfeeding assistance provided with permission   Pain assessment   Pain, additional Pain location   Pain Location Nipples   Guidelines for use of:   Breast pump type Ameda Platinum   Suggested use of pump For comfort as needed;Pump if infant is not latching to breast;Pump each time a supplement is offered   Other (comment) Formula supplement introduced overnight due to nipple discomfort. Breast pump at bedside, discussed guidelines for supplementing, nipple care, signs of adequate latch and risk factors for low milk supply. Encouraged to call [facility] at next feeding for breastfeeding support.

## 2023-10-15 NOTE — CM/SW NOTE
MDO Stress, Dad in medical school. Mom does not have a job. SW met with patient and spouse at bedside, introduced self and role. Patient lives with spouse at (address correct on face sheet). Patient reports that she is not currently working and that spouse is in medical school. Reports that they have taken out loans to cover costs of living at this time. Patient reports that they are already on [address] and have enough resources for baby at home at this time. Patient reports that they do have a carseat installed. Patient does not report current depression/anxiety, however does report hx of depression. Patient open to post partum depression/anxiety education and resources. Reports that baby will be seen by Dr. Cookie Prado. SW provided additional mental health and financial resources, patient accepted. SW notified patient to notify insurance of birth so that baby can be added to insurance, patient expressed understanding. MILA called and notified [address] of Medicaid status.      ZAHEER Hernández, [facility]    [id number]

## 2023-10-15 NOTE — PLAN OF CARE
Problem: BIRTH - VAGINAL/ SECTION  Goal: Fetal and maternal status remain reassuring during the birth process  Description: INTERVENTIONS:  - Monitor vital signs  - Monitor fetal heart rate  - Monitor uterine activity  - Monitor labor progression (vaginal delivery)  - DVT prophylaxis (C/S delivery)  - Surgical antibiotic prophylaxis (C/S delivery)  Outcome: Completed     Problem: PAIN - ADULT  Goal: Verbalizes/displays adequate comfort level or patient's stated pain goal  Description: INTERVENTIONS:  - Encourage pt to monitor pain and request assistance  - Assess pain using appropriate pain scale  - Administer analgesics based on type and severity of pain and evaluate response  - Implement non-pharmacological measures as appropriate and evaluate response  - Consider cultural and social influences on pain and pain management  - Manage/alleviate anxiety  - Utilize distraction and/or relaxation techniques  - Monitor for opioid side effects  - Notify MD/LIP if interventions unsuccessful or patient reports new pain  - Anticipate increased pain with activity and pre-medicate as appropriate  Outcome: Completed     Problem: ANXIETY  Goal: Will report anxiety at manageable levels  Description: INTERVENTIONS:  - Administer medication as ordered  - Teach and rehearse alternative coping skills  - Provide emotional support with 1:1 interaction with staff  Outcome: Completed     Problem: Patient Centered Care  Goal: Patient preferences are identified and integrated in the patient's plan of care  Description: Interventions:  - What would you like us to know as we care for you?   - Provide timely, complete, and accurate information to patient/family  - Incorporate patient and family knowledge, values, beliefs, and cultural backgrounds into the planning and delivery of care  - Encourage patient/family to participate in care and decision-making at the level they choose  - Honor patient and family perspectives and choices  Outcome: Completed     Problem: Diabetes/Glucose Control  Goal: Glucose maintained within prescribed range  Description: INTERVENTIONS:  - Monitor Blood Glucose as ordered  - Assess for signs and symptoms of hyperglycemia and hypoglycemia  - Administer ordered medications to maintain glucose within target range  - Assess barriers to adequate nutritional intake and initiate nutrition consult as needed  - Instruct patient on self management of diabetes  Outcome: Completed     Problem: Patient/Family Goals  Goal: Patient/Family Long Term Goal  Description: Patient's Long Term Goal:     Interventions:  -   - See additional Care Plan goals for specific interventions  Outcome: Completed  Goal: Patient/Family Short Term Goal  Description: Patient's Short Term Goal:     Interventions:   -   - See additional Care Plan goals for specific interventions  Outcome: Completed     Problem: POSTPARTUM  Goal: Long Term Goal:Experiences normal postpartum course  Description: INTERVENTIONS:  - Assess and monitor vital signs and lab values. - Assess fundus and lochia. - Provide ice/sitz baths for perineum discomfort. - Monitor healing of incision/episiotomy/laceration, and assess for signs and symptoms of infection and hematoma. - Assess bladder function and monitor for bladder distention.  - Provide/instruct/assist with pericare as needed. - Provide VTE prophylaxis as needed. - Monitor bowel function.  - Encourage ambulation and provide assistance as needed. - Assess and monitor emotional status and provide social service/psych resources as needed. - Utilize standard precautions and use personal protective equipment as indicated. Ensure aseptic care of all intravenous lines and invasive tubes/drains.  - Obtain immunization and exposure to communicable diseases history. Outcome: Completed  Goal: Optimize infant feeding at the breast  Description: INTERVENTIONS:  - Initiate breast feeding within first hour after birth. - Monitor effectiveness of current breast feeding efforts. - Assess support systems available to mother/family.  - Identify cultural beliefs/practices regarding lactation, letdown techniques, maternal food preferences. - Assess mother's knowledge and previous experience with breast feeding.  - Provide information as needed about early infant feeding cues (e.g., rooting, lip smacking, sucking fingers/hand) versus late cue of crying.  - Discuss/demonstrate breast feeding aids (e.g., infant sling, nursing footstool/pillows, and breast pumps). - Encourage mother/other family members to express feelings/concerns, and actively listen. - Educate father/SO about benefits of breast feeding and how to manage common lactation challenges. - Recommend avoidance of specific medications or substances incompatible with breast feeding.  - Assess and monitor for signs of nipple pain/trauma. - Instruct and provide assistance with proper latch. - Review techniques for milk expression (breast pumping) and storage of breast milk. Provide pumping equipment/supplies, instructions and assistance, as needed. - Encourage rooming-in and breast feeding on demand.  - Encourage skin-to-skin contact. - Provide LC support as needed. - Assess for and manage engorgement. - Provide breast feeding education handouts and information on community breast feeding support. Outcome: Completed  Goal: Establishment of adequate milk supply with medication/procedure interruptions  Description: INTERVENTIONS:  - Review techniques for milk expression (breast pumping). - Provide pumping equipment/supplies, instructions, and assistance until it is safe to breastfeed infant. Outcome: Completed  Goal: Experiences normal breast weaning course  Description: INTERVENTIONS:  - Assess for and manage engorgement. - Instruct on breast care. - Provide comfort measures.   Outcome: Completed  Goal: Appropriate maternal -  bonding  Description: INTERVENTIONS:  - Assess caregiver- interactions. - Assess caregiver's emotional status and coping mechanisms. - Encourage caregiver to participate in  daily care. - Assess support systems available to mother/family.  - Provide /case management support as needed.   Outcome: Completed

## 2023-10-15 NOTE — PLAN OF CARE
Problem: BIRTH - VAGINAL/ SECTION  Goal: Fetal and maternal status remain reassuring during the birth process  Description: INTERVENTIONS:  - Monitor vital signs  - Monitor fetal heart rate  - Monitor uterine activity  - Monitor labor progression (vaginal delivery)  - DVT prophylaxis (C/S delivery)  - Surgical antibiotic prophylaxis (C/S delivery)  Outcome: Progressing     Problem: PAIN - ADULT  Goal: Verbalizes/displays adequate comfort level or patient's stated pain goal  Description: INTERVENTIONS:  - Encourage pt to monitor pain and request assistance  - Assess pain using appropriate pain scale  - Administer analgesics based on type and severity of pain and evaluate response  - Implement non-pharmacological measures as appropriate and evaluate response  - Consider cultural and social influences on pain and pain management  - Manage/alleviate anxiety  - Utilize distraction and/or relaxation techniques  - Monitor for opioid side effects  - Notify MD/LIP if interventions unsuccessful or patient reports new pain  - Anticipate increased pain with activity and pre-medicate as appropriate  Outcome: Progressing     Problem: ANXIETY  Goal: Will report anxiety at manageable levels  Description: INTERVENTIONS:  - Administer medication as ordered  - Teach and rehearse alternative coping skills  - Provide emotional support with 1:1 interaction with staff  Outcome: Progressing     Problem: Patient Centered Care  Goal: Patient preferences are identified and integrated in the patient's plan of care  Description: Interventions:  - What would you like us to know as we care for you?   - Provide timely, complete, and accurate information to patient/family  - Incorporate patient and family knowledge, values, beliefs, and cultural backgrounds into the planning and delivery of care  - Encourage patient/family to participate in care and decision-making at the level they choose  - Honor patient and family perspectives and choices  Outcome: Progressing     Problem: Diabetes/Glucose Control  Goal: Glucose maintained within prescribed range  Description: INTERVENTIONS:  - Monitor Blood Glucose as ordered  - Assess for signs and symptoms of hyperglycemia and hypoglycemia  - Administer ordered medications to maintain glucose within target range  - Assess barriers to adequate nutritional intake and initiate nutrition consult as needed  - Instruct patient on self management of diabetes  Outcome: Progressing     Problem: Patient/Family Goals  Goal: Patient/Family Long Term Goal  Description: Patient's Long Term Goal:     Interventions:  -   - See additional Care Plan goals for specific interventions  Outcome: Progressing  Goal: Patient/Family Short Term Goal  Description: Patient's Short Term Goal:     Interventions:   -   - See additional Care Plan goals for specific interventions  Outcome: Progressing     Problem: POSTPARTUM  Goal: Long Term Goal:Experiences normal postpartum course  Description: INTERVENTIONS:  - Assess and monitor vital signs and lab values. - Assess fundus and lochia. - Provide ice/sitz baths for perineum discomfort. - Monitor healing of incision/episiotomy/laceration, and assess for signs and symptoms of infection and hematoma. - Assess bladder function and monitor for bladder distention.  - Provide/instruct/assist with pericare as needed. - Provide VTE prophylaxis as needed. - Monitor bowel function.  - Encourage ambulation and provide assistance as needed. - Assess and monitor emotional status and provide social service/psych resources as needed. - Utilize standard precautions and use personal protective equipment as indicated. Ensure aseptic care of all intravenous lines and invasive tubes/drains.  - Obtain immunization and exposure to communicable diseases history.   Outcome: Progressing  Goal: Optimize infant feeding at the breast  Description: INTERVENTIONS:  - Initiate breast feeding within first hour after birth. - Monitor effectiveness of current breast feeding efforts. - Assess support systems available to mother/family.  - Identify cultural beliefs/practices regarding lactation, letdown techniques, maternal food preferences. - Assess mother's knowledge and previous experience with breast feeding.  - Provide information as needed about early infant feeding cues (e.g., rooting, lip smacking, sucking fingers/hand) versus late cue of crying.  - Discuss/demonstrate breast feeding aids (e.g., infant sling, nursing footstool/pillows, and breast pumps). - Encourage mother/other family members to express feelings/concerns, and actively listen. - Educate father/SO about benefits of breast feeding and how to manage common lactation challenges. - Recommend avoidance of specific medications or substances incompatible with breast feeding.  - Assess and monitor for signs of nipple pain/trauma. - Instruct and provide assistance with proper latch. - Review techniques for milk expression (breast pumping) and storage of breast milk. Provide pumping equipment/supplies, instructions and assistance, as needed. - Encourage rooming-in and breast feeding on demand.  - Encourage skin-to-skin contact. - Provide LC support as needed. - Assess for and manage engorgement. - Provide breast feeding education handouts and information on community breast feeding support. Outcome: Progressing  Goal: Establishment of adequate milk supply with medication/procedure interruptions  Description: INTERVENTIONS:  - Review techniques for milk expression (breast pumping). - Provide pumping equipment/supplies, instructions, and assistance until it is safe to breastfeed infant. Outcome: Progressing  Goal: Experiences normal breast weaning course  Description: INTERVENTIONS:  - Assess for and manage engorgement. - Instruct on breast care. - Provide comfort measures.   Outcome: Progressing  Goal: Appropriate maternal -  bonding  Description: INTERVENTIONS:  - Assess caregiver- interactions. - Assess caregiver's emotional status and coping mechanisms. - Encourage caregiver to participate in  daily care. - Assess support systems available to mother/family.  - Provide /case management support as needed.   Outcome: Progressing

## 2023-10-15 NOTE — LACTATION NOTE
This note was copied from a baby's chart. LACTATION NOTE - INFANT    Evaluation Type  Evaluation Type: Inpatient    Problems & Assessment  Problems Diagnosed or Identified:  feeding problem;Disorganized suck  Problems: comment/detail: chomping, use of acessory muscles  Infant Assessment: Hunger cues present  Muscle tone: Appropriate for GA    Feeding Assessment  Summary Current Feeding: Adlib;Breastfeeding with formula supplement  Breastfeeding Assessment: Assisted with breastfeeding w/mother's permission;Deep latch achieved and observed; Tolerated feeding well  Breastfeeding lasted # of minutes: 10  Breastfeeding Positions: left breast  Latch: Grasps breast, tongue down, lips flanged, rhythmic sucking  Audible Sucks/Swallows: Spontaneous and intermittent (24 hours old)  Type of Nipple: Everted (after stimulation)  Comfort (Breast/Nipple): Engorged, cracked, bleeding, large blisters or bruises  Hold (Positioning): Full assist, teach one side, mother does other, staff holds  [facility] Score: 7  Other (comment): Chomping, cheek dimpling and grimacing observed with initial latch, then able to establish coordinated sucking pattern.

## 2023-10-17 NOTE — PROGRESS NOTES
[facility] Post Partum apt request (delivered 10/13)    Dr Catalino Hilton  [address] [place] [id number]  [phone]  Follow up 6 weeks  LVM to call [phone] to assist w/scheduling apt

## 2023-10-18 NOTE — PROGRESS NOTES
VM received  Calling pt back    OBGYN Post Partum apt request (delivered 10/13)     Dr Hong Victor  [address] [place] [id number] [phone]  Follow up 6 weeks  Apt made:   Mon 11/27 @1:00pm  Confirmed w/pt  Closing encounter

## 2023-10-24 NOTE — PATIENT INSTRUCTIONS
[address] RN, BSN, [place]  [phone]      Birth Weight: 8 lb 6.8 oz  Today's Naked Weight: 8 lb 4.5 oz       Recommendations based on today's evaluation: Dustin Kumar transferred a total of 28 ml from right breast in cross cradle hold using [address] for adequate support to assist with deep latch at breast. See below for information on achieving a deep latch, signs of an adequate feeding at breast with expected void/stool patterns and weight gain expectations. Consider expressing breast milk on nipples after feeding followed by unrefined organic coconut oil or comfort gel pads to promote nipple healing/comfort. Due to the possible restricted movement of upper lip evident by upper lip blisters and blanching (white) following feeding at breast, follow up lactation visit is recommended to evaluate breastfeeding progress and comfort/efficiency. FEEDING PLAN    Breastfeeding frequency: Continue to offer breast as demanded or parent led if Dustin Kumar has not fed by  3 hours from previous feeding, day and night until weight is fully stable and beyond birthweight. Supplementation: In lieu of breastfeeding, offer 60 ml (2 oz)  per feeding with gradual increase up to 3-4 oz by one month of age. If breastfeeding session meets signs of an adequate feeding as described below, supplementation is not indicated, if guidelines are not met, offer 1-2 oz following breastfeeding session. Use expressed breast milk, formula if breast milk is not available     Pumping: Due to intermittent usage of nipple shield, pump both breasts ~10 minutes when nipple shield is used to protect/increase milk supply. If bottle is electively given or medically indicated, pump both breasts ~10 minutes.  Adjust pump settings to comfort and efficiency as discussed today (start with rapid pace, low suction and gradual decrease in pace and increase in suction), based on today's measurement 21-23 mm flange is recommended. Follow up: 11/3/23 @ 2 pm or sooner if appointment becomes available with lactation. Follow up with pediatrician as scheduled. Breastfeeding suggestions if/when supplements are needed    Kangaroo mother care: Snuggle your baby between your breasts in just a diaper and covered with a blanket. Helps to wake a sleepy baby and increases your milk supply. Massage your breasts before nursing or pumping. Breastfeed with hunger cues, most babies will breastfeed 8-12 times every 24 hours with some cluster feeding, especially during growth spurts. Gently wake by 2-3 hours to feed if sleepy. Positioning: Your hand at neck/shoulders, not the back of head. Line chin with the bottom of your areola    Latching on:  Express drops of milk onto your baby's lips to encourage licking. Point your nipple to baby's nose  Stroke nipple lightly down center of lips  Wait for wide mouth with tongue cupped at bottom of mouth. Chin should be deep into breast, with some room between nose and the breast.   If needed, gently draw chin down lower to deepen latch. Is baby taking enough breastmilk? Swallowing with most sucks (every 1-3 sucks) until satisfied at least 8-12 times every 24 hours. (LASTING 15-30 MINUTES BETWEEN BOTH BREASTS)  Compressing the breast when your baby sucks can increase milk flow. At least 6-8 wet diapers and at least 3-4 soft, yellow seedy stools every 24 hours. Use breastfeeding journal.  Weight gain of at least 5-7 ounces per week    Supplementation:         If your baby is not meeting these guidelines for adequate breastfeeding, feed 1-2 oz or more expressed milk or formula with a wide based, slow flow nipple. Increase the amount of supplement until infant is having an adequate output    Paced bottle feeding using a slow flow nipple:   Hold your baby in an upright position, supporting the hand and neck with your hand, rather than in the crook of your arm.    Let you baby \"latch on\" to bottle: stroke nipple down from top lip to bottom, licking is good, wait for wide mouth, tongue cupped at bottom of mouth. Tip the bottle up just far enough that there is not air in the nipple. Pausing mimics breastfeeding and discourages \"guzzling\" the feeding, allowing infant to take at least 15 minutes to drink the breastmilk or formula. Milk Supply Increase strategies:  Continue to massage your breasts before nursing and/or pumping and provide skin to skin contact with your baby as much as possible. Pump both breasts for 15-20 minutes every 2-3 hours after nursing. (at least 8x/24 hours). If milk supply is low and not responding to above measures within the week:  Discuss use of herbs such as fenugreek with your OB physician and baby's physician. Review contraindications for the the use of herbal agents with lactation consultant prior to the start. Discuss thyroid check with OB physician     To care for nipples until healed:   If too sore to nurse on one or both breasts, pump one (or both) breast(s) to comfort every 3 hours. If nursing to contentment on one breast, this pumped milk can be stored for future use. If not nursing on either breast, feed baby your breast milk until able to return to breast.   Express drops of breast milk on nipples before and after nursing (unless nipple thrush is present). Use a hydrogel type dressing on your nipples between feedings. (Soothies or Ameda ComfortGel pads)  Discuss use of all purpose nipple ointment with your OB doctor. Call doctor if nipple has signs of infection: red/deep pink, drainage (pus), increased pain, fever. Watch for signs of yeast - see handout, \"Breastfeeding Suggestions for Possible Nipple/Breast Yeast (thrush)\"    Prevent plugged ducts and mastitis  Watch for signs of breast infection (mastitis) - painful breast, reddened area, fever, chills or flu-like symptoms - call your OB doctor at once if this occurs. Follow-up:  Call the [facility] at [phone] with any breastfeeding/pumping questions as needed  Call your pediatrician if any concerns develop  Keep your appointment with baby's doctor as planned        Call you baby's doctor with questions as well. Weight check sooner if wet or stool diapers decrease. Have weight checked again within 1-3 days of decreasing/stopping supplements. For additional information: [email or web address] org  [email or web address]. [employer]  www.breastfeeding. org  Www.[email or web address] com

## 2023-10-24 NOTE — LACTATION NOTE
10/24/23 1400   Evaluation Type   Evaluation Type Outpatient Initial   Problems identified   Problems identified Knowledge deficit; Nipple pain;Milk supply WNL   Problems Identified Other Cindy and Galileo Moses present with 6 day old [address] for breastfeeding evaluation with the following conerns: continued painful latch, some improvement and healing since hospital discharge, intermittent use of nipple shield for comfort (every other feeding). Current naked weight is 8 lb 4.5 oz, 2 oz below birthweight. Upon assessment, [address] presents with tight upper lip, upper lip blisters/blanching following feeding. Monique fed (15 minutes at breast) 1 hour at breast prior to this session, sleepy, minimal hunger cues on second breast. Cindy has pumped a few times with Motif pump, has collected 40-60 ml following AM breastfeeding session as of recent, 21-23mm flange recommended based on today's measurement. Maternal history   Maternal history Depression;Gestational diabetes   Breastfeeding goal   Breastfeeding goal To maintain breast milk feeding per patient goal   Maternal Assessment   Bilateral Breasts Soft;Symmetrical   Bilateral Nipples Sore;Everted;Colostrum easily expressed   Prior breastfeeding experience (comment below) Primip   Breastfeeding Assistance Breastfeeding assistance provided with permission   Pain assessment   Pain, additional Pain location;Pain w/initial sucks only   Location/Comment 4/10 with initial latch, improves to 1-2 within short time   Treatment of Sore Nipples Deeper latch techniques; Coconut oil;Expressed breast milk; Lanolin   Guidelines for use of:   Breast pump type Motif   Current use of pump: couple pump sessions to date   Suggested use of pump For comfort as needed;Pump if infant is not latching to breast;Pump each time a supplement is offered   Reported pumping volumes (ml) 40-60 ml   Post-feed pumped volume Not assessed today   Other (comment) see pt instructions

## 2023-10-26 NOTE — TELEPHONE ENCOUNTER
RN received call from [facility]ab stating that she had spoken to the pt and there was some concern for possible PPD. This RN called pt and spoke to her. Baby is cluster feeding at night, so pt is not getting much sleep. She says that her mood is good and that she has lots of support. Pt is laughing and sounds upbeat on the phone. Pt says that her bleeding continues to improve. She is still having perineal pain for which she is taking ibuprofen BID. Pt c/o constipation and painful BM d/t 2nd degree laceration. RN recommended Colace and Miralax. Pt is still c/o some carpal tunnel pain in her hands. RN told pt that should continue to improve postpartum. RN told pt that if she needs anything she should reach out to the office. Pt verbalized understanding and agreed with POC.

## 2023-10-26 NOTE — PROGRESS NOTES
Reviewed self and infant care with mom, she verbalizes understanding of instruction reviewed. Encouraged to follow up with MD's as directed with questions/concerns. Cindy states she is experiencing trouble sleeping and may need assistance with this. Dr. Nathan Howard' office notified with report provided to [facility], RN. PMAD information sent via my chart. Patient provided with resource number to [place] [facility]. Plan of care discussed with Cindy, who is in agreement. Patient denies thoughts of harm to self or others and instructed to call 911 for any such thoughts.

## 2023-11-01 NOTE — PATIENT INSTRUCTIONS
[address] RN, BSN, [place]  [phone]      Birth Weight: 8 lb 6.8  Today's Naked Weight: 8 lb 8.3   Weight increase: 4 oz in 8 days    Recommendations based on today's evaluation: Michelet Mora transferred a total of 106 ml from left (full) breast, uninterested in right breast at this session. Due to continued nipple pain, need for periodic nipple shield use, increase feeding frequency (every 1-1.5 hours), average of < 0.75-1.0 oz / day weight gain since last visit, visual signs of oral restriction, visual/audible sign of compensatory suck at breast (seal breaks, lip blanching, upper lip blister) the following oral exercises are recommended as well as further evaluation/assessment with speech/myofunctional therapist for additional guidance if needed (see handout provided). Tug of war with clean finger or pacifier  Rubbing of the lower gum ridge with clean finger, encourage tongue to follow and move side to side. Place clean finger near mouth, pad down toward tongue, allow baby to form seal on finger. When suckling begins and the center tongue elevates, gently press downward to elicit the reflex to elevate center tongue, hold gentle pressure for the duration of the elevation. When infant is at rest/sleep and mouth remains closed, place clean finger on chin, gently pull chin down to separate lips to observe tongue placement. If tongue is elevated, pull down with increased gentle pressure, this challenges tongue to remain elevated in the presence of resistance. Once tongue returns to the floor of the mouth, release your pressure/pull. Provide frequent opportunity for tummy time, this will assist with stretching and strengthening supportive muscle related to suckling/seal formation. If frequently spitting up or gassiness is of concern, place upright on parent chest following feeding approximately 10-20 minutes and burp often.       FEEDING PLAN    Breastfeeding frequency: Continue to breastfeed using support as demonstrated today, attempt football hold right breast to assist with more comfortable/efficient latch based on current level of discomfort in cross cradle/laid back position. Supplementation: If signs of an adequate feeding is not witnessed, offer bottle of expressed breast milk to satiety. Pumping: Pump as done currently to protect milk supply with periodic nipple shield use. Follow up: 11/14/23 @ 2pm    Breastfeeding suggestions if/when supplements are needed    Kangaroo mother care: Snuggle your baby between your breasts in just a diaper and covered with a blanket. Helps to wake a sleepy baby and increases your milk supply. Massage your breasts before nursing or pumping. Breastfeed with hunger cues, most babies will breastfeed 8-12 times every 24 hours with some cluster feeding, especially during growth spurts. Gently wake by 2-3 hours to feed if sleepy. Positioning: Your hand at neck/shoulders, not the back of head. Line chin with the bottom of your areola    Latching on:  Express drops of milk onto your baby's lips to encourage licking. Point your nipple to baby's nose  Stroke nipple lightly down center of lips  Wait for wide mouth with tongue cupped at bottom of mouth. Chin should be deep into breast, with some room between nose and the breast.   If needed, gently draw chin down lower to deepen latch. Is baby taking enough breastmilk? Swallowing with most sucks (every 1-3 sucks) until satisfied at least 8-12 times every 24 hours. (LASTING 15-30 MINUTES BETWEEN BOTH BREASTS)  Compressing the breast when your baby sucks can increase milk flow. At least 6-8 wet diapers and at least 3-4 soft, yellow seedy stools every 24 hours.  Use breastfeeding journal.  Weight gain of at least 5-7 ounces per week    Supplementation:         If your baby is not meeting these guidelines for adequate breastfeeding, feed 1-2 oz or more expressed milk or formula with a wide based, slow flow nipple. Increase the amount of supplement until infant is having an adequate output    Paced bottle feeding using a slow flow nipple:   Hold your baby in an upright position, supporting the hand and neck with your hand, rather than in the crook of your arm. Let you baby \"latch on\" to bottle: stroke nipple down from top lip to bottom, licking is good, wait for wide mouth, tongue cupped at bottom of mouth. Tip the bottle up just far enough that there is not air in the nipple. Pausing mimics breastfeeding and discourages \"guzzling\" the feeding, allowing infant to take at least 15 minutes to drink the breastmilk or formula. Milk Supply Increase strategies:  Continue to massage your breasts before nursing and/or pumping and provide skin to skin contact with your baby as much as possible. Pump both breasts for 15-20 minutes every 2-3 hours after nursing. (at least 8x/24 hours). If milk supply is low and not responding to above measures within the week:  Discuss use of herbs such as fenugreek with your OB physician and baby's physician. Review contraindications for the the use of herbal agents with lactation consultant prior to the start. Discuss thyroid check with OB physician     To care for nipples until healed:   If too sore to nurse on one or both breasts, pump one (or both) breast(s) to comfort every 3 hours. If nursing to contentment on one breast, this pumped milk can be stored for future use. If not nursing on either breast, feed baby your breast milk until able to return to breast.   Express drops of breast milk on nipples before and after nursing (unless nipple thrush is present). Use a hydrogel type dressing on your nipples between feedings. (Soothies or Ameda ComfortGel pads)  Discuss use of all purpose nipple ointment with your OB doctor. Call doctor if nipple has signs of infection: red/deep pink, drainage (pus), increased pain, fever.    Watch for signs of yeast - see handout, \"Breastfeeding Suggestions for Possible Nipple/Breast Yeast (thrush)\"    Prevent plugged ducts and mastitis  Watch for signs of breast infection (mastitis) - painful breast, reddened area, fever, chills or flu-like symptoms - call your OB doctor at once if this occurs. Follow-up:  Call the [facility] at [phone] with any breastfeeding/pumping questions as needed  Call your pediatrician if any concerns develop  Keep your appointment with baby's doctor as planned        Call you baby's doctor with questions as well. Weight check sooner if wet or stool diapers decrease. Have weight checked again within 1-3 days of decreasing/stopping supplements. For additional information: [employer]. org  [email or web address]. [employer]  www.[email or web address] org  Www.[email or web address] com

## 2023-11-01 NOTE — LACTATION NOTE
11/01/23 1100   Evaluation Type   Evaluation Type Outpatient Follow Up   Problems identified   Problems identified Knowledge deficit;Milk supply WNL; Nipple pain   Milk supply not WNL Reduced (potential)   Problems Identified Other Cindy presents with 23 day old [address] for follow up breastfeeding evalaution as scheduled with the following update: some improvement in nipple pain with latching, more pain on right side with lipstick nipple shape post feeding in CC and LB position, compression stipe reported on left nipple and less pain, lessened use of nipple shield, increase frequency of breastfeeding sessions every 1-1.5 hours over last couple days, feedings led by [address], occasional pump use with intermittent nipple shield use, Motif pump in use collecting 1-1.5 oz. Current naked weight is 8 lb 8.3 oz, 2 oz above birthweight and increase of 4 oz since last lactation visit 8 days prior. Breastfeeding goal   Breastfeeding goal To maintain breast milk feeding per patient goal   Maternal Assessment   Bilateral Breasts Symmetrical   Bilateral Nipples Everted;WNL; Sore   Right Breast Soft   Left Breast Full   Left Nipple Compression stripe   Prior breastfeeding experience (comment below) Primip   Breastfeeding Assistance Breastfeeding assistance provided with permission   Pain assessment   Pain, additional Pain location   Pain Location Nipples   Treatment of Sore Nipples Deeper latch techniques; Expressed breast milk   Guidelines for use of:   Equipment Nipple shield   Breast pump type Motif   Suggested use of pump Pump each time a supplement is offered;Pump after nursing if a nipple shield is used;Pump if infant is not latching to breast   Reported pumping volumes (ml) 30-45 ml   Post-feed pumped volume Not assessed today, Monique transferred 106 ml from left breast only   Other (comment) see pt instructions

## 2023-11-14 NOTE — PATIENT INSTRUCTIONS
[facility] RN, BSN, [place]  [phone]      Today's Naked Weight: 9 lb 3.5 oz   Weight increase: 11 oz in 13  days    Recommendations based on today's evaluation: Rebecca Redd transferred a total of 76ml at breast today (54 ml right, 22 ml left) with need to offer third breast (first breast twice) in effort to achieve latch that enables Monique to reach fattier milk substance. Signs of functional challenges at breast include: frequent seal breakage with feeding, pulling/popping off, inability to remain actively suckling with swallows following initial letdown and unable to empty breasts to satiety and full need at this particular feeding. Continue exercises discussed/provided to strengthen reflexive suck patterns, consider further evaluation of oral suck strength/quality with resources given by speech therapist Tiffanie Coffey) and/or Sierra Álvarez for additional assistance related to feeding challenges. Monitor your food intake and your GI response to food consumed. Due to reported GI issues (constipation etc.), consider anti-inflammatory diet, avoiding processed foods if/when able, probiotics, and continue pre/post  vitamins. FEEDING PLAN    Breastfeeding frequency: Continue to offer breast with each feeding using deep latch techniques demonstrated today and described below,     Supplementation: Offer 2 oz following afternoon and evening feeding using AM pump session volume, to increase volume/calories/day. Use expressed breast milk, formula if breast milk is not available     Pumping: Continue pumping after AM  feeding, consider 1-2 more sessions to protect milk supply due to supplementation recommendation/need. Follow up: With lactation as needed, with pediatrician as scheduled. Breastfeeding suggestions if/when supplements are needed    Kangaroo mother care: Snuggle your baby between your breasts in just a diaper and covered with a blanket.  Helps to wake a sleepy baby and increases your milk supply. Massage your breasts before nursing or pumping. Breastfeed with hunger cues, most babies will breastfeed 8-12 times every 24 hours with some cluster feeding, especially during growth spurts. Gently wake by 2-3 hours to feed if sleepy. Positioning: Your hand at neck/shoulders, not the back of head. Line chin with the bottom of your areola    Latching on:  Express drops of milk onto your baby's lips to encourage licking. Point your nipple to baby's nose  Stroke nipple lightly down center of lips  Wait for wide mouth with tongue cupped at bottom of mouth. Chin should be deep into breast, with some room between nose and the breast.   If needed, gently draw chin down lower to deepen latch. Is baby taking enough breastmilk? Swallowing with most sucks (every 1-3 sucks) until satisfied at least 8-12 times every 24 hours. (LASTING 15-30 MINUTES BETWEEN BOTH BREASTS)  Compressing the breast when your baby sucks can increase milk flow. At least 6-8 wet diapers and at least 3-4 soft, yellow seedy stools every 24 hours. Use breastfeeding journal.  Weight gain of at least 5-7 ounces per week    Supplementation:         If your baby is not meeting these guidelines for adequate breastfeeding, feed 1-2 oz or more expressed milk or formula with a wide based, slow flow nipple. Increase the amount of supplement until infant is having an adequate output    Paced bottle feeding using a slow flow nipple:   Hold your baby in an upright position, supporting the hand and neck with your hand, rather than in the crook of your arm. Let you baby \"latch on\" to bottle: stroke nipple down from top lip to bottom, licking is good, wait for wide mouth, tongue cupped at bottom of mouth. Tip the bottle up just far enough that there is not air in the nipple.   Pausing mimics breastfeeding and discourages \"guzzling\" the feeding, allowing infant to take at least 15 minutes to drink the breastmilk or formula. Milk Supply Increase strategies:  Continue to massage your breasts before nursing and/or pumping and provide skin to skin contact with your baby as much as possible. Pump both breasts for 15-20 minutes every 2-3 hours after nursing. (at least 8x/24 hours). If milk supply is low and not responding to above measures within the week:  Discuss use of herbs such as fenugreek with your OB physician and baby's physician. Review contraindications for the the use of herbal agents with lactation consultant prior to the start. Discuss thyroid check with OB physician     To care for nipples until healed:   If too sore to nurse on one or both breasts, pump one (or both) breast(s) to comfort every 3 hours. If nursing to contentment on one breast, this pumped milk can be stored for future use. If not nursing on either breast, feed baby your breast milk until able to return to breast.   Express drops of breast milk on nipples before and after nursing (unless nipple thrush is present). Use a hydrogel type dressing on your nipples between feedings. (Soothies or Ameda ComfortGel pads)  Discuss use of all purpose nipple ointment with your OB doctor. Call doctor if nipple has signs of infection: red/deep pink, drainage (pus), increased pain, fever. Watch for signs of yeast - see handout, \"Breastfeeding Suggestions for Possible Nipple/Breast Yeast (thrush)\"    Prevent plugged ducts and mastitis  Watch for signs of breast infection (mastitis) - painful breast, reddened area, fever, chills or flu-like symptoms - call your OB doctor at once if this occurs. Follow-up:  Call the [facility] at [phone] with any breastfeeding/pumping questions as needed  Call your pediatrician if any concerns develop  Keep your appointment with baby's doctor as planned        Call you baby's doctor with questions as well. Weight check sooner if wet or stool diapers decrease.  Have weight checked again within 1-3 days of decreasing/stopping supplements. For additional information: [employer]. org  Www.[email or web address]. PATHEOS  www.breastfeeding. org  Www.breastfeedingonline. com

## 2023-11-14 NOTE — LACTATION NOTE
11/14/23 1400   Evaluation Type   Evaluation Type Outpatient Follow Up   Problems identified   Problems identified Knowledge deficit;Milk supply not WNL   Milk supply not WNL Reduced (potential)   Problems Identified Other Cindy presents with 22 day old [address] for follow up breastfeeding evaluation with the following update: continues to breastfeed with each feeding, reports decreased nipple pain with latch, breastfeeding sessions are shorter than previously reported (5-8 minutes/feeding), pulls and pops off breast, recent dx of colic due to routine nighttime fussiness, recent bout of diarrhea caused concern and MD contact, unknown etiology reported, now resolved, regular yellow minimally seedy stools (witnessed), Cindy reports having hx of GI constipation and recent migraine requiring medication, per Allison Pualice = L3.   Breastfeeding goal   Breastfeeding goal To maintain breast milk feeding per patient goal   Maternal Assessment   Bilateral Breasts Symmetrical;Soft   Bilateral Nipples WNL;Elastic;Everted   Prior breastfeeding experience (comment below) Primip   Breastfeeding Assistance Breastfeeding assistance provided with permission   Pain assessment   Location/Comment denied   Treatment of Sore Nipples Deeper latch techniques; Expressed breast milk   Guidelines for use of:   Breast pump type Motif   Current use of pump: following AM feeding, has one 5 hour stretch of no pumping/feeding at night   Suggested use of pump Pump each time a supplement is offered; For comfort as needed   Reported pumping volumes (ml) 4 oz   Post-feed pumped volume Not assessed today, Monique transferred 76 ml today with feeding from 3 breasts   Other (comment) see pt instructions

## 2024-02-04 NOTE — TELEPHONE ENCOUNTER
Please triage. Per Dr. Shaggy Donahue last note, if left breast lump persists then plan was to check ultrasound. Okay to order left breast ultrasound if it has persisted. Thank you. 4 = No assist / stand by assistance

## 2024-02-05 NOTE — TELEPHONE ENCOUNTER
From: Cindy Washington  To: Nerissa Smiley  Sent: 2/5/2024 3:36 PM CST  Subject: Postpartum questions    Hi Dr. Smiley,    I am soon to be 4 months postpartum, and I am still having pain in my perineum. I have very sharp, stinging pain there when trying to have penetrative sex, so much so that I haven't been able to have sex normally since giving birth (no full penetration because it's too painful).  Also, my perineum feels very achy when I push/strain for a bowel movement.   Could there be something wrong with my perineum/vagina?    Thanks,  Cindy

## 2024-02-07 NOTE — PROGRESS NOTES
CC: Patient is here for vaginal pain after delivery    HPI: Patient is a 28 year old  for vaginal pain since giving birth 4 M ago, sharp, stinging pain with sex. Unable to continue with sex b/c of the pain. She also has pain with bearing down.     She is currently breast feeding.     She is not using anything for birth control b/c she is unable to have sex.   Patient's last menstrual period was 2023 (exact date).    OB History    Para Term  AB Living   1 1 1 0 0 1   SAB IAB Ectopic Multiple Live Births   0 0 0 0 1      # Outcome Date GA Lbr Dewayne/2nd Weight Sex Delivery Anes PTL Lv   1 Term 10/13/23 [id number] 10:08 :01 8 lb 6.8 oz (3.82 kg) F NORMAL SPONT EPI N PAT       GYN hx:       LPS:        Past Medical History:   Diagnosis Date    Acne     Acne     Depression     no meds since     Migraines      Past Surgical History:   Procedure Laterality Date    EYE SURGERY Bilateral      No Known Allergies  Family History   Problem Relation Age of Onset    No Known Problems Father     Osteoporosis Mother     High Cholesterol Mother     Other (basal cell carcinoma) Mother     Breast Cancer Maternal Grandmother 60    No Known Problems Maternal Grandfather     No Known Problems Paternal Grandmother     No Known Problems Paternal Grandfather     Psychiatric Sister         Anorexia    Other (Miller's) Sister     Depression Brother     Ovarian Cancer Neg     Colon Cancer Neg      Social History     Socioeconomic History    Marital status:      Spouse name: Not on file    Number of children: Not on file    Years of education: Not on file    Highest education level: Not on file   Occupational History    Occupation: Student     Comment: getting master's in Greenlandic at [employer]   Tobacco Use    Smoking status: Never    Smokeless tobacco: Never   Vaping Use    Vaping Use: Never used   Substance and Sexual Activity    Alcohol use: Never    Drug use: Never    Sexual activity: Yes     Partners: Male      Birth control/protection: Paragard, I.U.D.     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    Feels safe    Originally from [place]    Study Albanian     Social Determinants of Health     Financial Resource Strain: Medium Risk (10/13/2023)    Financial Resource Strain     Difficulty of Paying Living Expenses: Hard     Med Affordability: No   Food Insecurity: No Food Insecurity (10/13/2023)    Food Insecurity     Food Insecurity: Never true   Transportation Needs: No Transportation Needs (10/13/2023)    Transportation Needs     Lack of Transportation: No   Stress: No Stress Concern Present (10/13/2023)    Stress     Feeling of Stress : No   Housing Stability: Low Risk  (10/13/2023)    Housing Stability     Housing Instability: No     Housing Instability Emergency: Not on file       Medications reviewed. See active list.     /62   Ht 66\"   Wt 126 lb (57.2 kg)   LMP 01/09/2023 (Exact Date)   Breastfeeding Yes   BMI 20.34 kg/m²       Exam:   GENERAL: well developed, well nourished, in no apparent distress  ABDOMEN: Soft, non distended; non tender, no masses.  Liver and spleen non-tender, no enlargement. No palpable hernias  GYNE/:  External Genitalia: + scar tissue band across perineal body, tender to touch.         A/P: Patient is 28 year old female     1. Dyspareunia in female    Recommend removal of scar tissue and repair of superficial vaginal tissue. I dw her the procedure and postop course and risks including bleeding, infection, damage to internal organs.  Plan 2/16/2024 excision and repair of vaginal scar tissue    Scarlett Richards MD

## 2024-02-09 NOTE — TELEPHONE ENCOUNTER
----- Message from Scarlett Richards MD sent at 2/6/2024  7:10 PM CST -----    Please schedule the following surgery:    Procedure: excision of vaginal scar tissue and repair  Assist: (Y/N or none) yes  Date:       2/16/2024                               Time Requested:   Dx: dyspareunia in female  Pre-op appt: (Y/N or n/a) no  Admission type: (IN/OUT/OBVS) outpt  Department of discharge(SDS/Floor): SDS  Expected length of stay:  Procedure length time (please enter amount you are requesting): 1 hour  Recovery time (patients always ask):  Medical Clearance: (Y/N)  Post- Op f/u appt time frame:     Pre-op orders (choose one)   Use [facility] procedure driven order set in addition to anesthesia protocol   Use [facility] surgeon's personalized order set   Surgeon to enter pre-op orders   No pre-op orders   Use the prophylactic antibiotic protocol   No pre-op antibiotic orders indicated do not give antibiotic, if any, listed on the procedure driven order sets or personalized order sets    PCN allergy Yes___ or No___   If Yes: Proceed with PCN/Cephalosporin recommended antibiotic as benefit outweighs risk     Proceed with PCN/Cephalosporin recommended antibiotic as not a true allergy    Proceed with recommended alternative antibiotic for PCN allergy        ALL Medicaid/including BCBS community: Tubal/ Hyst form MUST be signed (30 days):      Message to nurses:

## 2024-02-12 NOTE — TELEPHONE ENCOUNTER
The patient called and stated that she is having surgery Friday and she has a few questions about it.

## 2024-02-12 NOTE — TELEPHONE ENCOUNTER
RN spoke with pt. Pt is sked for surgery with LC on 2/16. Pt is concerned because she needs to fast for 10h before her procedure, and her surgery is sked for 5pm. Pt is solely breastfeeding, and she is afraid that if she breastfeeds without eating and drinking, she will dehydrate, get weak, and have low BP. Pt is also wondering if she will be okay to go to [facility] and go on rides in mid-March. RN told pt that RN thinks that should be fine, but will speak with LC about all the pt's concerns when LC returns to the office on 2/14. RN told pt that RN will call pt after speaking with LC. Pt verbalized understanding and agreed with POC.

## 2024-02-15 NOTE — DISCHARGE INSTRUCTIONS
HOME INSTRUCTIONS  Call if fever greater than 101, worsening pain, nausea or vomiting, heavy vaginal bleeding.    No sex tampons or douching until after 2 W visit    It is ok to shower, but do not soak your wound.        AMBSURG HOME CARE INSTRUCTIONS: POST-OP ANESTHESIA  The medication that you received for sedation or general anesthesia can last up to 24 hours. Your judgment and reflexes may be altered, even if you feel like your normal self.      We Recommend:   Do not drive any motor vehicle or bicycle   Avoid mowing the lawn, playing sports, or working with power tools/applicances (power saws, electric knives or mixers)   That you have someone stay with you on your first night home   Do not drink alcohol or take sleeping pills or tranquilizers   Do not sign legal documents within 24 hours of your procedure   If you had a nerve block for your surgery, take extra care not to put any pressure on your arm or hand for 24 hours    It is normal:  For you to have a sore throat if you had a breathing tube during surgery (while you were asleep!). The sore throat should get better within 48 hours. You can gargle with warm salt water (1/2 tsp in 4 oz warm water) or use a throat lozenge for comfort  To feel muscle aches or soreness especially in the abdomen, chest or neck. The achy feeling should go away in the next 24 hours  To feel weak, sleepy or \"wiped out\". Your should start feeling better in the next 24 hours.   To experience mild discomforts such as sore lip or tongue, headache, cramps, gas pains or a bloated feeling in your abdomen.   To experience mild back pain or soreness for a day or two if you had spinal or epidural anesthesia.   If you had laparoscopic surgery, to feel shoulder pain or discomfort on the day of surgery.   For some patients to have nausea after surgery/anesthesia    If you feel nausea or experience vomiting:   Try to move around less.   Eat less than usual or drink only liquids until the next  morning   Nausea should resolve in about 24 hours    If you have a problem when you are at home:    Call your surgeons office   Discharge Instructions: After Your Surgery  You’ve just had surgery. During surgery, you were given medicine called anesthesia to keep you relaxed and free of pain. After surgery, you may have some pain or nausea. This is common. Here are some tips for feeling better and getting well after surgery.   Going home  Your healthcare provider will show you how to take care of yourself when you go home. They'll also answer your questions. Have an adult family member or friend drive you home. For the first 24 hours after your surgery:   Don't drive or use heavy equipment.  Don't make important decisions or sign legal papers.  Take medicines as directed.  Don't drink alcohol.  Have someone stay with you, if needed. They can watch for problems and help keep you safe.  Be sure to go to all follow-up visits with your healthcare provider. And rest after your surgery for as long as your provider tells you to.   Coping with pain  If you have pain after surgery, pain medicine will help you feel better. Take it as directed, before pain becomes severe. Also, ask your healthcare provider or pharmacist about other ways to control pain. This might be with heat, ice, or relaxation. And follow any other instructions your surgeon or nurse gives you.      Stay on schedule with your medicine.     Tips for taking pain medicine  To get the best relief possible, remember these points:   Pain medicines can upset your stomach. Taking them with a little food may help.  Most pain relievers taken by mouth need at least 20 to 30 minutes to start to work.  Don't wait till your pain becomes severe before you take your medicine. Try to time your medicine so that you can take it before starting an activity. This might be before you get dressed, go for a walk, or sit down for dinner.  Constipation is a common side effect of some  pain medicines. Call your healthcare provider before taking any medicines such as laxatives or stool softeners to help ease constipation. Also ask if you should skip any foods. Drinking lots of fluids and eating foods such as fruits and vegetables that are high in fiber can also help. Remember, don't take laxatives unless your surgeon has prescribed them.  Drinking alcohol and taking pain medicine can cause dizziness and slow your breathing. It can even be deadly. Don't drink alcohol while taking pain medicine.  Pain medicine can make you react more slowly to things. Don't drive or run machinery while taking pain medicine.  Your healthcare provider may tell you to take acetaminophen to help ease your pain. Ask them how much you're supposed to take each day. Acetaminophen or other pain relievers may interact with your prescription medicines or other over-the-counter (OTC) medicines. Some prescription medicines have acetaminophen and other ingredients in them. Using both prescription and OTC acetaminophen for pain can cause you to accidentally overdose. Read the labels on your OTC medicines with care. This will help you to clearly know the list of ingredients, how much to take, and any warnings. It may also help you not take too much acetaminophen. If you have questions or don't understand the information, ask your pharmacist or healthcare provider to explain it to you before you take the OTC medicine.   Managing nausea  Some people have an upset stomach (nausea) after surgery. This is often because of anesthesia, pain, or pain medicine, less movement of food in the stomach, or the stress of surgery. These tips will help you handle nausea and eat healthy foods as you get better. If you were on a special food plan before surgery, ask your healthcare provider if you should follow it while you get better. Check with your provider on how your eating should progress. It may depend on the surgery you had. These general tips  may help:   Don't push yourself to eat. Your body will tell you when to eat and how much.  Start off with clear liquids and soup. They're easier to digest.  Next try semi-solid foods as you feel ready. These include mashed potatoes, applesauce, and gelatin.  Slowly move to solid foods. Don’t eat fatty, rich, or spicy foods at first.  Don't force yourself to have 3 large meals a day. Instead eat smaller amounts more often.  Take pain medicines with a small amount of solid food, such as crackers or toast. This helps prevent nausea.  When to call your healthcare provider  Call your healthcare provider right away if you have any of these:   You still have too much pain, or the pain gets worse, after taking the medicine. The medicine may not be strong enough. Or there may be a complication from the surgery.  You feel too sleepy, dizzy, or groggy. The medicine may be too strong.  Side effects such as nausea or vomiting. Your healthcare provider may advise taking other medicines to .  Skin changes such as rash, itching, or hives. This may mean you have an allergic reaction. Your provider may advise taking other medicines.  The incision looks different (for instance, part of it opens up).  Bleeding or fluid leaking from the incision site, and weren't told to expect that.  Fever of 100.4°F (38°C) or higher, or as directed by your provider.  Call 911  Call 911 right away if you have:   Trouble breathing  Facial swelling    If you have obstructive sleep apnea   You were given anesthesia medicine during surgery to keep you comfortable and free of pain. After surgery, you may have more apnea spells because of this medicine and other medicines you were given. The spells may last longer than normal.    At home:  Keep using the continuous positive airway pressure (CPAP) device when you sleep. Unless your healthcare provider tells you not to, use it when you sleep, day or night. CPAP is a common device used to treat obstructive  sleep apnea.  Talk with your provider before taking any pain medicine, muscle relaxants, or sedatives. Your provider will tell you about the possible dangers of taking these medicines.  Contact your provider if your sleeping changes a lot even when taking medicines as directed.  [employer] last reviewed this educational content on 10/1/2021  © 8451-8864 [employer]. All rights reserved. This information is not intended as a substitute for professional medical care. Always follow your healthcare professional's instructions.

## 2024-02-16 NOTE — ANESTHESIA PREPROCEDURE EVALUATION
Anesthesia PreOp Note    HPI:     Cindy Washington is a 28 year old female who presents for preoperative consultation requested by: Scarlett Richards MD    Date of Surgery: 2/16/2024    Procedure(s):  Excision of vaginal scar tissue and repair  Indication: Dyspareunia in female [N94.10]    Relevant Problems   No relevant active problems       NPO:  Last Liquid Consumption Date: 02/15/24  Last Liquid Consumption Time: 2300  Last Solid Consumption Date: 02/15/24  Last Solid Consumption Time: 2300  Last Liquid Consumption Date: 02/15/24          History Review:  Patient Active Problem List    Diagnosis Date Noted    Vaginal scar 02/16/2024    Sore nipples due to lactation 10/24/2023    Rh negative state in antepartum period 10/13/2023    Pregnancy 10/12/2023    Chronic bilateral low back pain with left-sided sciatica 12/19/2022    Migraine without aura and without status migrainosus, not intractable 12/05/2022    Lumbar spondylosis 07/29/2022       Past Medical History:   Diagnosis Date    Acne     Acne     Back problem     lower back pain    Depression     no meds since 2016    Migraines        Past Surgical History:   Procedure Laterality Date    EYE SURGERY Bilateral        Medications Prior to Admission   Medication Sig Dispense Refill Last Dose    Prenatal Vit-Fe Fumarate-FA (MULTI PRENATAL) 27-0.8 MG Oral Tab Take by mouth.   Past Month    Rizatriptan Benzoate 10 MG Oral Tab Take 1 tablet (10 mg total) by mouth as needed for Migraine. Do not exceed 20 mg in 24 hours. 20 tablet 3 Past Month    Magnesium 100 MG Oral Tab Take by mouth.   Past Week    Polyethylene Glycol 3350 (MIRALAX) 17 GM/SCOOP Oral Powder Take 17 g by mouth daily.   Past Week    cholecalciferol 50 MCG (2000 UT) Oral Tab Take 1 tablet (2,000 Units total) by mouth daily.   Past Month    Omega-3 Fatty Acids (FISH OIL) 1200 MG Oral Cap Take by mouth.   Past Month    Acidophilus/Pectin Oral Cap Take 1 capsule by mouth daily.    Past Month     Current Facility-Administered Medications Ordered in Epic   Medication Dose Route Frequency Provider Last Rate Last Admin    lactated ringers infusion   Intravenous Continuous Scarlett Richards MD 20 mL/hr at 02/16/24 1524 New Bag at 02/16/24 1524     Current Outpatient Medications Ordered in Epic   Medication Sig Dispense Refill    ibuprofen 600 MG Oral Tab Take 1 tablet (600 mg total) by mouth every 6 (six) hours as needed for Pain (for pain). 30 tablet 0       No Known Allergies    Family History   Problem Relation Age of Onset    No Known Problems Father     Osteoporosis Mother     High Cholesterol Mother     Other (basal cell carcinoma) Mother     Breast Cancer Maternal Grandmother 60    No Known Problems Maternal Grandfather     No Known Problems Paternal Grandmother     No Known Problems Paternal Grandfather     Psychiatric Sister         Anorexia    Other (Miller's) Sister     Depression Brother     Ovarian Cancer Neg     Colon Cancer Neg      Social History     Socioeconomic History    Marital status:    Occupational History    Occupation: Student     Comment: getting master's in French at [employer]   Tobacco Use    Smoking status: Never    Smokeless tobacco: Never   Vaping Use    Vaping Use: Never used   Substance and Sexual Activity    Alcohol use: Never    Drug use: Never    Sexual activity: Yes     Partners: Male     Birth control/protection: Paragard, I.U.D.     Comment: Paragard placed 12/2020   Other Topics Concern    Blood Transfusions No       Available pre-op labs reviewed.  Lab Results   Component Value Date    URINEPREG Negative 02/16/2024             Vital Signs:  Body mass index is 19.69 kg/m².   height is 1.676 m (5' 6\") and weight is 55.3 kg (122 lb). Her temperature is 97.3 °F (36.3 °C). Her blood pressure is 111/65 and her pulse is 57. Her respiration is 16 and oxygen saturation is 100%.   Vitals:    02/13/24 1424 02/16/24 1500   BP:  111/65   Pulse:  57   Resp:   16   Temp:  97.3 °F (36.3 °C)   SpO2:  100%   Weight: 56.2 kg (124 lb) 55.3 kg (122 lb)   Height: 1.676 m (5' 6\")         Anesthesia Evaluation     Patient summary reviewed and Nursing notes reviewed    No history of anesthetic complications   Airway   Mallampati: I  TM distance: >3 FB  Neck ROM: full  Dental      Pulmonary - negative ROS and normal exam   Cardiovascular - negative ROS and normal exam    Neuro/Psych    (+)  neuromuscular disease,  depression      GI/Hepatic/Renal - negative ROS     Endo/Other    Abdominal                  Anesthesia Plan:   ASA:  1  Plan:   General  Informed Consent Plan and Risks Discussed With:  Patient      I have informed Cindy Washington and/or legal guardian or family member of the nature of the anesthetic plan, benefits, risks including possible dental damage if relevant, major complications, and any alternative forms of anesthetic management.   All of the patient's questions were answered to the best of my ability. The patient desires the anesthetic management as planned.  Tyrone Jiménez MD  2/16/2024 3:51 PM  Present on Admission:  **None**

## 2024-02-16 NOTE — ANESTHESIA POSTPROCEDURE EVALUATION
Patient: Cindy Washington    Procedure Summary       Date: 02/16/24 Room / Location: [facility] MAIN OR 08 / EM MAIN OR    Anesthesia Start: 1603 Anesthesia Stop: 1645    Procedure: Excision of vaginal scar tissue and repair (Vagina ) Diagnosis:       Dyspareunia in female      (Dyspareunia in female [N94.10])    Surgeons: Scarlett Richards MD Anesthesiologist: Tyrone Jiménez MD    Anesthesia Type: general ASA Status: 1            Anesthesia Type: general    Vitals Value Taken Time   BP 98/52 02/16/24 1646   Temp  02/16/24 1646   Pulse 75 02/16/24 1646   Resp 14 02/16/24 1646   SpO2 100 02/16/24 1646       EM AN Post Evaluation:   Patient Evaluated in PACU  Patient Participation: complete - patient participated  Level of Consciousness: awake  Pain Management: adequate  Airway Patency:patent  Yes    Nausea/Vomiting: none  Cardiovascular Status: acceptable  Respiratory Status: acceptable  Postoperative Hydration acceptable      Tyrone Jiménez MD  2/16/2024 4:46 PM

## 2024-02-16 NOTE — BRIEF OP NOTE
Pre-Operative Diagnosis: Dyspareunia in female [N94.10]     Post-Operative Diagnosis: * No post-op diagnosis entered *      Procedure Performed:   Excision of vaginal scar tissue and repair    Surgeon(s) and Role:     * Scarlett Richards MD - Primary    Assistant(s):  Surgical Assistant.: Mark Carvalho     Surgical Findings: Thin band of scar tissue across perineal body with small pinpoint hole posterior.      Specimen: vaginal mucosa to path     Estimated Blood Loss: No data recorded        Scarlett Richards MD  2/16/2024  2:00 PM

## 2024-02-16 NOTE — H&P
GYN H&P     2024  1:57 PM    CC: Patient is here for excision and repair of vaginal scar    HPI: Patient is a 28 year old  s?p  10/13/2023 presented to office with persistent vaginal pain and pain with sex. PE s/f scar over perineal body, tender to touch          Patient's last menstrual period was 2023 (exact date).    OB History    Para Term  AB Living   1 1 1 0 0 1   SAB IAB Ectopic Multiple Live Births   0 0 0 0 1      # Outcome Date GA Lbr Dewayne/2nd Weight Sex Delivery Anes PTL Lv   1 Term 10/13/23 39w4d 10:08 / 01:01 8 lb 6.8 oz (3.82 kg) F NORMAL SPONT EPI N PAT       GYN hx:             Past Medical History:   Diagnosis Date    Acne     Acne     Back problem     lower back pain    Depression     no meds since 2016    Migraines      Past Surgical History:   Procedure Laterality Date    EYE SURGERY Bilateral      No Known Allergies  Family History   Problem Relation Age of Onset    No Known Problems Father     Osteoporosis Mother     High Cholesterol Mother     Other (basal cell carcinoma) Mother     Breast Cancer Maternal Grandmother 60    No Known Problems Maternal Grandfather     No Known Problems Paternal Grandmother     No Known Problems Paternal Grandfather     Psychiatric Sister         Anorexia    Other (Miller's) Sister     Depression Brother     Ovarian Cancer Neg     Colon Cancer Neg      Social History     Socioeconomic History    Marital status:    Occupational History    Occupation: Student     Comment: getting master's in Mauritian at [employer]   Tobacco Use    Smoking status: Never    Smokeless tobacco: Never   Vaping Use    Vaping Use: Never used   Substance and Sexual Activity    Alcohol use: Never    Drug use: Never    Sexual activity: Yes     Partners: Male     Birth control/protection: Paragard, I.U.JOSELYN.     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 See active list.     Ht 66\"   Wt 124 lb (56.2 kg)   LMP 01/09/2023 (Exact Date)   BMI 20.01 kg/m²       Exam:   GENERAL: well developed, well nourished, in no apparent distress  SKIN: no rashes, no suspicious lesions  HEENT: normal  NECK: supple; no thyroidmegaly, no adenopathy  BREASTS: symmetrical, nontender, no palpable masses or nodes, no nipple discharge, no skin changes, no dippling, no palpable axillary adenopathy  ABDOMEN: Soft, non distended; non tender, no masses.  Liver and spleen non-tender, no enlargement. No palpable hernias  GYNE/:  External Genitalia: Normal appearing, no lesions, normal hair distribution   Urethral meatus appear wnl, no abnormal discharge or lesions noted.   Bladder: well supported, urethra wnl, no palpable tenderness or masses, no discharge  Vagina: normal pink mucosa, no lesions, normal clear discharge.   Uterus: midline, mobile, non-tender, firm and smooth  Cervix: pink, no lesions grossly visible, no discharge  Adnexa: non tender, no palpable masses, normal size  Anus:  No lesions or visible hemorrhoids        A/P: Patient is 28 year old female with vaginal scar after childbirth presents for excision and repair. I discussed with the patient  the procedure including the preop/procedure/postop course and the risks of  bleeding, infection, damage to internal organs.  All questions were answered.    Scarlett Richards MD

## 2024-02-16 NOTE — ANESTHESIA PROCEDURE NOTES
Airway  Date/Time: 2/16/2024 4:08 PM  Urgency: Elective      General Information and Staff    Patient location during procedure: OR  Anesthesiologist: Tyrone Jiménez MD  Performed: anesthesiologist   Performed by: Tyrone Jiménez MD  Authorized by: Tyrone Jiménez MD      Indications and Patient Condition  Indications for airway management: anesthesia  Sedation level: deep  Preoxygenated: yes  Patient position: sniffing  Mask difficulty assessment: 1 - vent by mask    Final Airway Details  Final airway type: supraglottic airway      Successful airway: classic  Size 4       Number of attempts at approach: 1

## 2024-02-19 NOTE — TELEPHONE ENCOUNTER
Called pt c/o pain at surgical site, pt taking Ibuprofen 600 mg, stinging feeling, pulling/tugging at vaginal area.  Pt concerned with healing.  Informed will page LC provider on-call, agrees.    Paged LC informed and stated it is normal.  Called pt informed. States again pulling/tugging informed again it is normal.

## 2024-02-19 NOTE — OPERATIVE REPORT
[facility]    PATIENT'S NAME: MADDISON GARCIA   ATTENDING PHYSICIAN: Scarlett Richards MD   OPERATING PHYSICIAN: Scarlett Richards MD   PATIENT ACCOUNT#:   [id number]    LOCATION:  [address]  MEDICAL RECORD #:   [id number]       YOB: 1995  ADMISSION DATE:       02/16/2024      OPERATION DATE:  02/16/2024    OPERATIVE REPORT    PREOPERATIVE DIAGNOSIS:  Dyspareunia in female with vaginoperineal scarring.  POSTOPERATIVE DIAGNOSIS:  Dyspareunia in female with vaginoperineal scarring.  PROCEDURE:  Excision of vaginal scar tissue and repair.    ASSISTANT:  LONNIE Lynn.    SPECIMEN:  Vaginal mucosa to Pathology.    ESTIMATED BLOOD LOSS:  2 mL.    FINDINGS:  A thin approximately 0.5 cm band of scar tissue across the perineal body, a small pinpoint hole posteriorly; that band was tight.    OPERATIVE TECHNIQUE:  Patient was taken to the operating room, placed supine on the table.  General anesthesia was induced.  The patient was placed in the dorsal lithotomy position.  The perineum and vagina were prepped and draped in the usual sterile fashion.  A time-out was performed.  The previous scar was excised.  The superficial epithelium was opened up with the use of scissors, and the perineal body was reapproximated with a stitch of 2-0 Vicryl.  Then, the skin was reapproximated by sewing the inside of the vaginal mucosa to the outer vaginal mucosa in a horizontal incision.  Hemostasis was adequate.  The patient was placed supine on the table, extubated, and taken to the recovery room in stable condition.    Dictated By Scarlett Richards MD  d: 02/16/2024 16:38:46  t: 02/17/2024 00:28:25  [facility] [id number]  /

## 2024-02-19 NOTE — TELEPHONE ENCOUNTER
Incoming call from patient requesting to speak with Dr. White or NR to inform she has been having pain and stinging on her vaginal area sense her surgery completed 02/16/2024.   Also per patient she is taking ibuprofen but the pain still not improving  .      Please assist

## 2024-02-20 NOTE — PROGRESS NOTES
CC: Patient is here for postop FU    HPI: Patient is a 28 year old  for postop FU. Patient had removal of small piece of scar tissue on perineal body last Friday. She is c/o burning over incision. She is also concerned that her L labia minora is absent. No fever chills, no nausea or vomiting.     She was able to get premarin cream last week.       Patient's last menstrual period was 2023 (exact date).    OB History    Para Term  AB Living   1 1 1 0 0 1   SAB IAB Ectopic Multiple Live Births   0 0 0 0 1      # Outcome Date GA Lbr Dewayne/2nd Weight Sex Delivery Anes PTL Lv   1 Term 10/13/23 39w4d 10:08 :01 8 lb 6.8 oz (3.82 kg) F NORMAL SPONT EPI N PAT       GYN hx:       LPS:      Past Medical History:   Diagnosis Date    Acne     Acne     Back problem     lower back pain    Depression     no meds since 2016    Migraines      Past Surgical History:   Procedure Laterality Date    EYE SURGERY Bilateral      No Known Allergies  Family History   Problem Relation Age of Onset    No Known Problems Father     Osteoporosis Mother     High Cholesterol Mother     Other (basal cell carcinoma) Mother     Breast Cancer Maternal Grandmother 60    No Known Problems Maternal Grandfather     No Known Problems Paternal Grandmother     No Known Problems Paternal Grandfather     Psychiatric Sister         Anorexia    Other (Miller's) Sister     Depression Brother     Ovarian Cancer Neg     Colon Cancer Neg      Social History     Socioeconomic History    Marital status:      Spouse name: Not on file    Number of children: Not on file    Years of education: Not on file    Highest education level: Not on file   Occupational History    Occupation: Student     Comment: getting master's in Jamaican at [employer]   Tobacco Use    Smoking status: Never    Smokeless tobacco: Never   Vaping Use    Vaping Use: Never used   Substance and Sexual Activity    Alcohol use: Never    Drug use: Never    Sexual activity: Yes      Partners: Male     Birth control/protection: Paragard, I.U.D.     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[employer]    Feels safe    Originally from [place]    Study Eritrean     Social Determinants of Health     Financial Resource Strain: Medium Risk (10/13/2023)    Financial Resource Strain     Difficulty of Paying Living Expenses: Hard     Med Affordability: No   Food Insecurity: No Food Insecurity (10/13/2023)    Food Insecurity     Food Insecurity: Never true   Transportation Needs: No Transportation Needs (10/13/2023)    Transportation Needs     Lack of Transportation: No   Stress: No Stress Concern Present (10/13/2023)    Stress     Feeling of Stress : No   Housing Stability: Low Risk  (10/13/2023)    Housing Stability     Housing Instability: No     Housing Instability Emergency: Not on file       Medications reviewed. See active list.     /62   Ht 66\"   Wt 126 lb (57.2 kg)   LMP 01/09/2023 (Exact Date)   BMI 20.34 kg/m²       Exam:   GENERAL: well developed, well nourished, in no apparent distress  ABDOMEN: Soft, non distended; non tender, no masses.  Liver and spleen non-tender, no enlargement. No palpable hernias  GYNE/:  External Genitalia: Normal appearing, no lesions, normal hair distribution. Bottom of L labia minor absent or perhaps poorly aligned after child birth (it had not been removed with surgery). Stitched dissolving. No discharge. Skin and area of excision healing well, slightly tender to touch         A/P: Patient is 28 year old female      1. Postop check    Reassured pt that she is doing well. DW her that bottom of L labia minora was not removed with surgery. DW her that it may be misaligned after vaginal repair with delivry or torn off with delivery.     Area healing well. FU as needed.     Scarlett Richards MD

## 2024-02-29 NOTE — PROGRESS NOTES
CC: Patient is here for postop FU    HPI: Patient is a 28 year old  for postop FU. Vaginal incision feels much better. No longer feels pulling sensation. Using premarin cream.     Patient's last menstrual period was 2023 (exact date).    OB History    Para Term  AB Living   1 1 1 0 0 1   SAB IAB Ectopic Multiple Live Births   0 0 0 0 1      # Outcome Date GA Lbr Dewayne/2nd Weight Sex Delivery Anes PTL Lv   1 Term 10/13/23 39w4d 10:08 :01 8 lb 6.8 oz (3.82 kg) F NORMAL SPONT EPI N PAT       GYN hx:       LPS:        Past Medical History:   Diagnosis Date    Acne     Acne     Back problem     lower back pain    Depression     no meds since 2016    Migraines      Past Surgical History:   Procedure Laterality Date    EYE SURGERY Bilateral      No Known Allergies  Family History   Problem Relation Age of Onset    No Known Problems Father     Osteoporosis Mother     High Cholesterol Mother     Other (basal cell carcinoma) Mother     Breast Cancer Maternal Grandmother 60    No Known Problems Maternal Grandfather     No Known Problems Paternal Grandmother     No Known Problems Paternal Grandfather     Psychiatric Sister         Anorexia    Other (Miller's) Sister     Depression Brother     Ovarian Cancer Neg     Colon Cancer Neg      Social History     Socioeconomic History    Marital status:      Spouse name: Not on file    Number of children: Not on file    Years of education: Not on file    Highest education level: Not on file   Occupational History    Occupation: Student     Comment: getting master's in Montenegrin at [employer]   Tobacco Use    Smoking status: Never    Smokeless tobacco: Never   Vaping Use    Vaping Use: Never used   Substance and Sexual Activity    Alcohol use: Never    Drug use: Never    Sexual activity: Yes     Partners: Male     Birth control/protection: Paragard, I.U.D.     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[employer]    Feels safe    Originally from [place]    Study Kinyarwanda     Social Determinants of Health     Financial Resource Strain: Medium Risk (10/13/2023)    Financial Resource Strain     Difficulty of Paying Living Expenses: Hard     Med Affordability: No   Food Insecurity: No Food Insecurity (10/13/2023)    Food Insecurity     Food Insecurity: Never true   Transportation Needs: No Transportation Needs (10/13/2023)    Transportation Needs     Lack of Transportation: No   Stress: No Stress Concern Present (10/13/2023)    Stress     Feeling of Stress : No   Housing Stability: Low Risk  (10/13/2023)    Housing Stability     Housing Instability: No     Housing Instability Emergency: Not on file       Medications reviewed. See active list.     LMP 01/09/2023 (Exact Date)       Exam:   GENERAL: well developed, well nourished, in no apparent distress  ABDOMEN: Soft, non distended; non tender, no masses.  Liver and spleen non-tender, no enlargement. No palpable hernias  GYNE/:  External Genitalia: Perineal body well healed. Stitches removed.         A/P: Patient is 28 year old female     1. Postop check    Recommend continue estrogen cream and okay to have sex in 2 weeks.   Area very well healed.   Scarlett Richards MD

## 2024-04-02 NOTE — PROGRESS NOTES
HPI:   Cindy Washington is a 28 year old female who presents for a complete physical exam.     Has been having migraines about 4-5 times a month, and Rizatriptan does not always help. Feels stress is a trigger. Did try Riboflavin 400 mg daily, but did not find it effective.   Moving back to [place] soon to live with family and save up while her  is applying for medical residency.     Last pap: 9/2022 and normal    Menses: None with breastfeeding  Contraception:  None    History of STD's: None  History of intimate partner violence: None  Family hx of breast, ovarian, cervical or colon CA: MGM with breast cancer x 2   Diet and exercise: Has been doing some light jogging and weight training as well. Tries to do this 4-5 times a week. Trying to eat a well balanced diet.   Immunizations:  Tdap: 2023, Flu: 9/2023, HPV: Completed, Covid: Completed 3 doses    REVIEW OF SYSTEMS:   GENERAL: feels well otherwise   SKIN: denies any unusual skin lesions  EYES: no vision problems  BREAST: no lumps or masses, no nipple discharge   LUNGS: denies shortness of breath  CARDIOVASCULAR: denies chest pain  GI: denies abdominal pain,  No constipation or diarrhea, no hematochezia  : denies dysuria, vaginal discharge or itching  NEURO: frequent migraines   PSYCHE: denies depression or anxiety  MSK: improved back pain         Current Outpatient Medications   Medication Sig Dispense Refill    SUMAtriptan 50 MG Oral Tab Take 1 tablet (50 mg total) by mouth every 2 (two) hours as needed for Migraine. Not to exceed 200 mg in 24 hours 30 tablet 2    Magnesium 100 MG Oral Tab Take by mouth.      Polyethylene Glycol 3350 (MIRALAX) 17 GM/SCOOP Oral Powder Take 17 g by mouth daily.      cholecalciferol 50 MCG (2000 UT) Oral Tab Take 1 tablet (2,000 Units total) by mouth daily.      Omega-3 Fatty Acids (FISH OIL) 1200 MG Oral Cap Take by mouth.      Acidophilus/Pectin Oral Cap Take 1 capsule by mouth daily.       No Known Allergies    Past Medical History:   Diagnosis Date    Acne     Acne     Back problem     lower back pain    Depression     no meds since 2016    Migraines       Past Surgical History:   Procedure Laterality Date    EYE SURGERY Bilateral       Family History   Problem Relation Age of Onset    No Known Problems Father     Osteoporosis Mother     High Cholesterol Mother     Other (basal cell carcinoma) Mother     Breast Cancer Maternal Grandmother 60    No Known Problems Maternal Grandfather     No Known Problems Paternal Grandmother     No Known Problems Paternal Grandfather     Psychiatric Sister         Anorexia    Other (Miller's) Sister     Depression Brother     Ovarian Cancer Neg     Colon Cancer Neg       Social History:   Social History     Socioeconomic History    Marital status:    Occupational History    Occupation: Student     Comment: getting master's in Persian at [employer]   Tobacco Use    Smoking status: Never    Smokeless tobacco: Never   Vaping Use    Vaping Use: Never used   Substance and Sexual Activity    Alcohol use: Never    Drug use: Never    Sexual activity: Yes     Partners: Male     Birth control/protection: Paragard, I.U.JOSELYN.     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 [employer]    Feels safe    Originally from [place]    Study Armenian     Social Determinants of Health     Financial Resource Strain: Medium Risk (10/13/2023)    Financial Resource Strain     Difficulty of Paying Living Expenses: Hard     Med Affordability: No   Food Insecurity: No Food Insecurity (10/13/2023)    Food Insecurity     Food Insecurity: Never true   Transportation Needs: No Transportation Needs (10/13/2023)    Transportation Needs     Lack of Transportation: No   Stress: No Stress Concern Present (10/13/2023)    Stress     Feeling of Stress : No   Housing Stability: Low Risk  (10/13/2023)    Housing Stability     Housing  Instability: No     Occ: Graduate degree in French. : Yes. Children: 1 daughter.         EXAM:     Wt Readings from Last 6 Encounters:   04/02/24 124 lb (56.2 kg)   02/29/24 126 lb 8 oz (57.4 kg)   02/20/24 126 lb (57.2 kg)   02/16/24 122 lb (55.3 kg)   02/06/24 126 lb (57.2 kg)   11/27/23 129 lb 14.4 oz (58.9 kg)     Body mass index is 20.01 kg/m².   /72   Pulse 95   Ht 5' 6\" (1.676 m)   Wt 124 lb (56.2 kg)   LMP 01/09/2023 (Exact Date)   SpO2 99%   Breastfeeding Yes   BMI 20.01 kg/m²     GENERAL: well developed, well nourished, in no apparent distress   SKIN: no rashes, no suspicious lesions  HEENT: atraumatic, normocephalic, throat clear; normal dentition  EYES: PERRLA, EOMI, conjunctiva are clear  NECK: supple, no adenopathy or thyroid masses   BREAST: no dominant or suspicious mass, no nipple discharge  LUNGS: clear to auscultation  CARDIO: RRR without murmur  GI: good bowel sounds, no masses, HSM or tenderness  : deferred, not due for pap smear and no current concerns   EXTREMITIES: no edema    Cholesterol, Total (mg/dL)   Date Value   03/04/2022 155   02/08/2022 142   01/10/2022 152     HDL Cholesterol (mg/dL)   Date Value   03/04/2022 51   02/08/2022 47   01/10/2022 48     LDL Cholesterol (mg/dL)   Date Value   03/04/2022 95   02/08/2022 84   01/10/2022 93      ASSESSMENT AND PLAN:   Cindy Washington is a 28 year old female who presents for a complete physical exam.  Encounter Diagnoses   Name Primary?    Encounter for routine adult health examination without abnormal findings Yes    Migraine without aura and without status migrainosus, not intractable      No orders of the defined types were placed in this encounter.    Increased frequency of migraines likely related to stress, less sleep, and inconsistent eating habits since having a baby. Will try to eliminate triggers as much as possible, but also switch to Sumatriptan  mg as needed at onset of migraine as  Rizatriptan has been less effective. Discussed potential for overuse headaches if taking -triptans too often, and consider daily prophylactic medication if persisting or worsening.     Discussed with patient the following:  -Monthly breast self-exam  -Breast cancer screening/mammograms and clinical breast exams  -Cervical cancer screening/pap smears  -Adequate calcium and Vitamin D intake to prevent osteoporosis  -Healthy diet including adequate intake of vegetables and fruits, appropriate portion sizes, minimizing highly concentrated carbohydrate foods  -Exercising 30 minutes a day most days of the week   -Diabetes screening which she declines  -Cholesterol screening which she declines  -Recommendation for yearly influenza vaccine  -Need for HPV vaccination series: completed   -Need for Tdap once as an adult and Td booster every 10 years  -Contraception: None  -STI screening (GC/Chlamydia/HIV): Not indicated   -Hepatitis C screening for all adults between the ages of 18 and 79: Checked and negative     All questions were answered during the visit and the patient verbalizes understanding. She will return in one year for next WWE or sooner as needed.    Meds & Refills for this Visit:  Requested Prescriptions     Signed Prescriptions Disp Refills    SUMAtriptan 50 MG Oral Tab 30 tablet 2     Sig: Take 1 tablet (50 mg total) by mouth every 2 (two) hours as needed for Migraine. Not to exceed 200 mg in 24 hours       Imaging & Consults:  None    Treva Elise DO  4/2/2024  11:40 AM
